# Patient Record
Sex: FEMALE | Race: WHITE | NOT HISPANIC OR LATINO | Employment: OTHER | URBAN - METROPOLITAN AREA
[De-identification: names, ages, dates, MRNs, and addresses within clinical notes are randomized per-mention and may not be internally consistent; named-entity substitution may affect disease eponyms.]

---

## 2023-03-03 LAB
ANION GAP IN SER/PLAS: 13 MMOL/L (ref 10–20)
CALCIDIOL (25 OH VITAMIN D3) (NG/ML) IN SER/PLAS: 57 NG/ML
CALCIUM (MG/DL) IN SER/PLAS: 9.3 MG/DL (ref 8.6–10.3)
CARBON DIOXIDE, TOTAL (MMOL/L) IN SER/PLAS: 27 MMOL/L (ref 21–32)
CHLORIDE (MMOL/L) IN SER/PLAS: 101 MMOL/L (ref 98–107)
CHOLESTEROL (MG/DL) IN SER/PLAS: 145 MG/DL (ref 0–199)
CHOLESTEROL IN HDL (MG/DL) IN SER/PLAS: 59.9 MG/DL
CHOLESTEROL/HDL RATIO: 2.4
CREATININE (MG/DL) IN SER/PLAS: 0.83 MG/DL (ref 0.5–1.05)
GFR FEMALE: 74 ML/MIN/1.73M2
GLUCOSE (MG/DL) IN SER/PLAS: 88 MG/DL (ref 74–99)
LDL: 68 MG/DL (ref 0–99)
POTASSIUM (MMOL/L) IN SER/PLAS: 4.2 MMOL/L (ref 3.5–5.3)
SODIUM (MMOL/L) IN SER/PLAS: 137 MMOL/L (ref 136–145)
THYROTROPIN (MIU/L) IN SER/PLAS BY DETECTION LIMIT <= 0.05 MIU/L: 1.28 MIU/L (ref 0.44–3.98)
TRIGLYCERIDE (MG/DL) IN SER/PLAS: 87 MG/DL (ref 0–149)
UREA NITROGEN (MG/DL) IN SER/PLAS: 13 MG/DL (ref 6–23)
VLDL: 17 MG/DL (ref 0–40)

## 2023-03-08 LAB — CANCER AG 19-9 (U/ML) IN SER/PLAS: 114.12 U/ML

## 2023-03-15 LAB — URINE CULTURE: NO GROWTH

## 2023-07-12 LAB
ALANINE AMINOTRANSFERASE (SGPT) (U/L) IN SER/PLAS: 15 U/L (ref 7–45)
ALBUMIN (G/DL) IN SER/PLAS: 4 G/DL (ref 3.4–5)
ALKALINE PHOSPHATASE (U/L) IN SER/PLAS: 54 U/L (ref 33–136)
ASPARTATE AMINOTRANSFERASE (SGOT) (U/L) IN SER/PLAS: 26 U/L (ref 9–39)
BILIRUBIN DIRECT (MG/DL) IN SER/PLAS: 0.1 MG/DL (ref 0–0.3)
BILIRUBIN TOTAL (MG/DL) IN SER/PLAS: 0.4 MG/DL (ref 0–1.2)
CALCIDIOL (25 OH VITAMIN D3) (NG/ML) IN SER/PLAS: 49 NG/ML
CHOLESTEROL (MG/DL) IN SER/PLAS: 152 MG/DL (ref 0–199)
CHOLESTEROL IN HDL (MG/DL) IN SER/PLAS: 54.9 MG/DL
CHOLESTEROL/HDL RATIO: 2.8
LDL: 67 MG/DL (ref 0–99)
PROTEIN TOTAL: 7.6 G/DL (ref 6.4–8.2)
THYROTROPIN (MIU/L) IN SER/PLAS BY DETECTION LIMIT <= 0.05 MIU/L: 2.81 MIU/L (ref 0.44–3.98)
TRIGLYCERIDE (MG/DL) IN SER/PLAS: 149 MG/DL (ref 0–149)
VLDL: 30 MG/DL (ref 0–40)

## 2023-07-21 LAB — URINE CULTURE: NORMAL

## 2023-09-13 PROBLEM — D22.9 SKIN MOLE: Status: ACTIVE | Noted: 2023-09-13

## 2023-09-13 PROBLEM — N95.2 POSTMENOPAUSAL ATROPHIC VAGINITIS: Status: ACTIVE | Noted: 2023-09-13

## 2023-09-13 PROBLEM — I83.93 VARICOSE VEINS OF BOTH LOWER EXTREMITIES: Status: ACTIVE | Noted: 2023-09-13

## 2023-09-13 PROBLEM — L81.4 OTHER MELANIN HYPERPIGMENTATION: Status: ACTIVE | Noted: 2023-05-11

## 2023-09-13 PROBLEM — D18.01 HEMANGIOMA OF SKIN AND SUBCUTANEOUS TISSUE: Status: ACTIVE | Noted: 2023-05-11

## 2023-09-13 PROBLEM — G43.909 MIGRAINE SYNDROME: Status: ACTIVE | Noted: 2023-09-13

## 2023-09-13 PROBLEM — D84.9 IMMUNODEFICIENCY (MULTI): Status: ACTIVE | Noted: 2023-09-13

## 2023-09-13 PROBLEM — D37.8 NEOPLASM OF UNCERTAIN BEHAVIOR OF PANCREAS: Status: ACTIVE | Noted: 2023-09-13

## 2023-09-13 PROBLEM — E04.2 MULTINODULAR GOITER: Status: ACTIVE | Noted: 2023-09-13

## 2023-09-13 PROBLEM — G47.00 INSOMNIA: Status: ACTIVE | Noted: 2023-09-13

## 2023-09-13 PROBLEM — Z22.39: Status: ACTIVE | Noted: 2023-09-13

## 2023-09-13 PROBLEM — G89.29 CHRONIC RIGHT SHOULDER PAIN: Status: ACTIVE | Noted: 2023-09-13

## 2023-09-13 PROBLEM — N81.4 CYSTOCELE WITH UTERINE PROLAPSE: Status: ACTIVE | Noted: 2023-09-13

## 2023-09-13 PROBLEM — D22.62 MELANOCYTIC NEVI OF LEFT UPPER LIMB, INCLUDING SHOULDER: Status: ACTIVE | Noted: 2023-05-11

## 2023-09-13 PROBLEM — F33.9 DEPRESSION, RECURRENT (CMS-HCC): Status: ACTIVE | Noted: 2023-09-13

## 2023-09-13 PROBLEM — M79.672 CHRONIC PAIN IN LEFT FOOT: Status: ACTIVE | Noted: 2023-09-13

## 2023-09-13 PROBLEM — D22.5 MELANOCYTIC NEVI OF TRUNK: Status: ACTIVE | Noted: 2023-05-11

## 2023-09-13 PROBLEM — R06.83 SNORES: Status: ACTIVE | Noted: 2023-09-13

## 2023-09-13 PROBLEM — R42 POSTURAL DIZZINESS: Status: ACTIVE | Noted: 2023-09-13

## 2023-09-13 PROBLEM — G89.29 CHRONIC HEADACHE: Status: ACTIVE | Noted: 2023-09-13

## 2023-09-13 PROBLEM — M25.532 CHRONIC PAIN OF LEFT WRIST: Status: ACTIVE | Noted: 2023-09-13

## 2023-09-13 PROBLEM — E55.9 VITAMIN D DEFICIENCY: Status: ACTIVE | Noted: 2023-09-13

## 2023-09-13 PROBLEM — G89.29 CHRONIC PAIN OF LEFT WRIST: Status: ACTIVE | Noted: 2023-09-13

## 2023-09-13 PROBLEM — H91.93 BILATERAL HEARING LOSS: Status: ACTIVE | Noted: 2023-09-13

## 2023-09-13 PROBLEM — K21.9 GERD (GASTROESOPHAGEAL REFLUX DISEASE): Status: ACTIVE | Noted: 2023-09-13

## 2023-09-13 PROBLEM — G96.191 TARLOV CYSTS: Status: ACTIVE | Noted: 2023-09-13

## 2023-09-13 PROBLEM — G25.81 RESTLESS LEGS SYNDROME: Status: ACTIVE | Noted: 2023-09-13

## 2023-09-13 PROBLEM — D22.70 MELANOCYTIC NEVI OF LOWER LIMB, INCLUDING HIP: Status: ACTIVE | Noted: 2023-05-11

## 2023-09-13 PROBLEM — J32.9 RECURRENT SINUSITIS: Status: ACTIVE | Noted: 2023-09-13

## 2023-09-13 PROBLEM — N12 PYELONEPHRITIS: Status: ACTIVE | Noted: 2023-09-13

## 2023-09-13 PROBLEM — G47.10 HYPERSOMNIA: Status: ACTIVE | Noted: 2023-09-13

## 2023-09-13 PROBLEM — E87.6 HYPOKALEMIA: Status: ACTIVE | Noted: 2023-09-13

## 2023-09-13 PROBLEM — H90.5 SNHL (SENSORINEURAL HEARING LOSS): Status: ACTIVE | Noted: 2023-09-13

## 2023-09-13 PROBLEM — M54.42 CHRONIC BILATERAL LOW BACK PAIN WITH LEFT-SIDED SCIATICA: Status: ACTIVE | Noted: 2023-09-13

## 2023-09-13 PROBLEM — J47.1 BRONCHIECTASIS WITH ACUTE EXACERBATION (MULTI): Status: ACTIVE | Noted: 2023-09-13

## 2023-09-13 PROBLEM — N95.0 POSTMENOPAUSAL BLEEDING: Status: ACTIVE | Noted: 2023-09-13

## 2023-09-13 PROBLEM — R53.81 MALAISE: Status: ACTIVE | Noted: 2023-09-13

## 2023-09-13 PROBLEM — D72.819 LEUKOPENIA: Status: ACTIVE | Noted: 2023-09-13

## 2023-09-13 PROBLEM — N39.0 RECURRENT UTI: Status: ACTIVE | Noted: 2023-09-13

## 2023-09-13 PROBLEM — G44.86 CERVICOGENIC HEADACHE: Status: ACTIVE | Noted: 2023-09-13

## 2023-09-13 PROBLEM — M81.0 AGE RELATED OSTEOPOROSIS: Status: ACTIVE | Noted: 2023-09-13

## 2023-09-13 PROBLEM — K86.2 PANCREATIC CYST (HHS-HCC): Status: ACTIVE | Noted: 2023-09-13

## 2023-09-13 PROBLEM — E78.5 DYSLIPIDEMIA: Status: ACTIVE | Noted: 2023-09-13

## 2023-09-13 PROBLEM — R53.81 PHYSICAL DECONDITIONING: Status: ACTIVE | Noted: 2023-09-13

## 2023-09-13 PROBLEM — L82.1 OTHER SEBORRHEIC KERATOSIS: Status: ACTIVE | Noted: 2023-05-11

## 2023-09-13 PROBLEM — R00.2 PALPITATIONS: Status: ACTIVE | Noted: 2023-09-13

## 2023-09-13 PROBLEM — R29.818 SUSPECTED SLEEP APNEA: Status: ACTIVE | Noted: 2023-09-13

## 2023-09-13 PROBLEM — D83.9 COMMON VARIABLE IMMUNODEFICIENCY (MULTI): Status: ACTIVE | Noted: 2023-09-13

## 2023-09-13 PROBLEM — R51.9 CHRONIC HEADACHE: Status: ACTIVE | Noted: 2023-09-13

## 2023-09-13 PROBLEM — M51.17 SCIATIC RADICULITIS: Status: ACTIVE | Noted: 2023-09-13

## 2023-09-13 PROBLEM — K52.9 CHRONIC DIARRHEA OF UNKNOWN ORIGIN: Status: ACTIVE | Noted: 2023-09-13

## 2023-09-13 PROBLEM — G89.29 CHRONIC PAIN IN LEFT FOOT: Status: ACTIVE | Noted: 2023-09-13

## 2023-09-13 PROBLEM — E03.9 HYPOTHYROIDISM: Status: ACTIVE | Noted: 2023-09-13

## 2023-09-13 PROBLEM — M25.511 CHRONIC RIGHT SHOULDER PAIN: Status: ACTIVE | Noted: 2023-09-13

## 2023-09-13 PROBLEM — M85.80 OSTEOPENIA: Status: ACTIVE | Noted: 2023-09-13

## 2023-09-13 PROBLEM — L98.8 RHYTIDOSIS FACIALIS: Status: ACTIVE | Noted: 2023-05-11

## 2023-09-13 PROBLEM — G89.29 CHRONIC BILATERAL LOW BACK PAIN WITH LEFT-SIDED SCIATICA: Status: ACTIVE | Noted: 2023-09-13

## 2023-09-13 RX ORDER — SODIUM FLUORIDE 5 MG/G
GEL, DENTIFRICE DENTAL
COMMUNITY
Start: 2021-03-15 | End: 2024-02-12 | Stop reason: WASHOUT

## 2023-09-13 RX ORDER — ALBUTEROL SULFATE 0.83 MG/ML
1 SOLUTION RESPIRATORY (INHALATION) 2 TIMES DAILY
COMMUNITY
Start: 2023-03-30 | End: 2024-02-13 | Stop reason: SDUPTHER

## 2023-09-13 RX ORDER — AZELASTINE HCL 205.5 UG/1
SPRAY NASAL 2 TIMES DAILY
COMMUNITY

## 2023-09-13 RX ORDER — ESCITALOPRAM OXALATE 5 MG/1
5 TABLET ORAL NIGHTLY
COMMUNITY
Start: 2022-02-17 | End: 2023-10-16

## 2023-09-13 RX ORDER — ESCITALOPRAM OXALATE 10 MG/1
10 TABLET ORAL
COMMUNITY
Start: 2014-12-08 | End: 2023-10-16 | Stop reason: SDUPTHER

## 2023-09-13 RX ORDER — GINSENG 100 MG
CAPSULE ORAL
COMMUNITY
End: 2023-10-16

## 2023-09-13 RX ORDER — AFLIBERCEPT 40 MG/ML
2 INJECTION, SOLUTION INTRAVITREAL ONCE
COMMUNITY

## 2023-09-13 RX ORDER — PSYLLIUM HUSK 0.4 G
CAPSULE ORAL
COMMUNITY
Start: 2023-03-17

## 2023-09-13 RX ORDER — AZELASTINE 1 MG/ML
SPRAY, METERED NASAL
COMMUNITY
Start: 2023-04-13

## 2023-09-13 RX ORDER — PHENYLPROPANOLAMINE/CLEMASTINE 75-1.34MG
600 TABLET, EXTENDED RELEASE ORAL DAILY PRN
COMMUNITY

## 2023-09-13 RX ORDER — ALBUTEROL SULFATE 90 UG/1
2 AEROSOL, METERED RESPIRATORY (INHALATION) 2 TIMES DAILY
COMMUNITY

## 2023-09-13 RX ORDER — FAMOTIDINE 20 MG/1
20 TABLET, FILM COATED ORAL
COMMUNITY
Start: 2019-10-18 | End: 2023-10-16 | Stop reason: SDUPTHER

## 2023-09-13 RX ORDER — MULTIVITAMIN WITH IRON
1 TABLET ORAL DAILY
COMMUNITY
Start: 2020-04-08

## 2023-09-13 RX ORDER — ROSUVASTATIN CALCIUM 20 MG/1
1 TABLET, COATED ORAL DAILY
COMMUNITY
Start: 2019-07-19 | End: 2023-10-16

## 2023-09-13 RX ORDER — BUDESONIDE 0.5 MG/2ML
0.5 INHALANT ORAL
COMMUNITY
Start: 2015-08-25

## 2023-09-13 RX ORDER — EPINEPHRINE 0.22MG
100 AEROSOL WITH ADAPTER (ML) INHALATION
COMMUNITY

## 2023-09-13 RX ORDER — TALC
POWDER (GRAM) TOPICAL
COMMUNITY

## 2023-09-13 RX ORDER — ESTRADIOL 0.1 MG/G
CREAM VAGINAL
COMMUNITY
Start: 2022-03-16 | End: 2024-03-11 | Stop reason: SDUPTHER

## 2023-09-13 RX ORDER — FOLIC ACID 20 MG
CAPSULE ORAL
COMMUNITY
End: 2023-10-16

## 2023-09-13 RX ORDER — LEVOTHYROXINE SODIUM 75 UG/1
1 CAPSULE ORAL
COMMUNITY
End: 2024-03-14 | Stop reason: SDUPTHER

## 2023-09-13 RX ORDER — TRETINOIN 0.25 MG/G
CREAM TOPICAL
COMMUNITY
Start: 2023-05-11 | End: 2024-04-19 | Stop reason: ALTCHOICE

## 2023-09-13 RX ORDER — FOLIC ACID 1 MG/1
1 TABLET ORAL DAILY
COMMUNITY

## 2023-09-13 RX ORDER — DOCUSATE SODIUM 100 MG/1
100 CAPSULE, LIQUID FILLED ORAL
COMMUNITY
End: 2023-10-16

## 2023-09-13 RX ORDER — CEFADROXIL 500 MG/1
500 CAPSULE ORAL DAILY
COMMUNITY
Start: 2022-03-07 | End: 2023-10-16

## 2023-09-13 RX ORDER — POTASSIUM CHLORIDE 750 MG/1
10 CAPSULE, EXTENDED RELEASE ORAL DAILY
COMMUNITY
End: 2023-10-16 | Stop reason: SDUPTHER

## 2023-09-13 RX ORDER — PSYLLIUM HUSK/CALCIUM CARB 1 G-60 MG
CAPSULE ORAL
COMMUNITY
End: 2023-10-16

## 2023-09-13 RX ORDER — ROPINIROLE 4 MG/1
4 TABLET, FILM COATED ORAL
COMMUNITY
Start: 2014-04-18 | End: 2024-03-14 | Stop reason: SDUPTHER

## 2023-09-13 RX ORDER — POTASSIUM &MAGNESIUM ASPARTATE 250-250 MG
CAPSULE ORAL
COMMUNITY

## 2023-09-13 RX ORDER — IPRATROPIUM BROMIDE 42 UG/1
SPRAY, METERED NASAL
COMMUNITY
Start: 2015-09-30

## 2023-10-02 DIAGNOSIS — Z82.0: Primary | ICD-10-CM

## 2023-10-04 ENCOUNTER — ANCILLARY PROCEDURE (OUTPATIENT)
Dept: RADIOLOGY | Facility: CLINIC | Age: 73
End: 2023-10-04
Payer: MEDICARE

## 2023-10-04 DIAGNOSIS — Z12.31 ENCOUNTER FOR SCREENING MAMMOGRAM FOR MALIGNANT NEOPLASM OF BREAST: ICD-10-CM

## 2023-10-04 PROCEDURE — 77063 BREAST TOMOSYNTHESIS BI: CPT | Mod: BILATERAL PROCEDURE | Performed by: RADIOLOGY

## 2023-10-04 PROCEDURE — 77067 SCR MAMMO BI INCL CAD: CPT | Mod: BILATERAL PROCEDURE | Performed by: RADIOLOGY

## 2023-10-04 PROCEDURE — 77067 SCR MAMMO BI INCL CAD: CPT

## 2023-10-05 ENCOUNTER — HOSPITAL ENCOUNTER (OUTPATIENT)
Dept: RADIOLOGY | Facility: HOSPITAL | Age: 73
Discharge: HOME | End: 2023-10-05
Payer: MEDICARE

## 2023-10-05 ENCOUNTER — TELEPHONE (OUTPATIENT)
Dept: PRIMARY CARE | Facility: CLINIC | Age: 73
End: 2023-10-05
Payer: MEDICARE

## 2023-10-05 DIAGNOSIS — M81.0 AGE-RELATED OSTEOPOROSIS WITHOUT CURRENT PATHOLOGICAL FRACTURE: ICD-10-CM

## 2023-10-05 PROCEDURE — 77085 DXA BONE DENSITY AXL VRT FX: CPT

## 2023-10-05 PROCEDURE — 77085 DXA BONE DENSITY AXL VRT FX: CPT | Performed by: STUDENT IN AN ORGANIZED HEALTH CARE EDUCATION/TRAINING PROGRAM

## 2023-10-09 ENCOUNTER — TELEPHONE (OUTPATIENT)
Dept: PRIMARY CARE | Facility: CLINIC | Age: 73
End: 2023-10-09
Payer: MEDICARE

## 2023-10-16 ENCOUNTER — OFFICE VISIT (OUTPATIENT)
Dept: PRIMARY CARE | Facility: CLINIC | Age: 73
End: 2023-10-16
Payer: MEDICARE

## 2023-10-16 VITALS
RESPIRATION RATE: 16 BRPM | WEIGHT: 124.34 LBS | SYSTOLIC BLOOD PRESSURE: 90 MMHG | OXYGEN SATURATION: 97 % | HEIGHT: 67 IN | HEART RATE: 56 BPM | DIASTOLIC BLOOD PRESSURE: 60 MMHG | BODY MASS INDEX: 19.52 KG/M2

## 2023-10-16 DIAGNOSIS — K21.00 GASTROESOPHAGEAL REFLUX DISEASE WITH ESOPHAGITIS WITHOUT HEMORRHAGE: ICD-10-CM

## 2023-10-16 DIAGNOSIS — D84.9 IMMUNODEFICIENCY (MULTI): ICD-10-CM

## 2023-10-16 DIAGNOSIS — E87.6 HYPOKALEMIA: ICD-10-CM

## 2023-10-16 DIAGNOSIS — E03.0 CONGENITAL HYPOTHYROIDISM WITH DIFFUSE GOITER: Primary | ICD-10-CM

## 2023-10-16 DIAGNOSIS — F33.9 DEPRESSION, RECURRENT (CMS-HCC): ICD-10-CM

## 2023-10-16 DIAGNOSIS — E78.5 DYSLIPIDEMIA: ICD-10-CM

## 2023-10-16 PROCEDURE — 1036F TOBACCO NON-USER: CPT | Performed by: INTERNAL MEDICINE

## 2023-10-16 PROCEDURE — 1159F MED LIST DOCD IN RCRD: CPT | Performed by: INTERNAL MEDICINE

## 2023-10-16 PROCEDURE — 1126F AMNT PAIN NOTED NONE PRSNT: CPT | Performed by: INTERNAL MEDICINE

## 2023-10-16 PROCEDURE — 99214 OFFICE O/P EST MOD 30 MIN: CPT | Performed by: INTERNAL MEDICINE

## 2023-10-16 RX ORDER — POTASSIUM CHLORIDE 750 MG/1
CAPSULE, EXTENDED RELEASE ORAL
Qty: 90 CAPSULE | Refills: 0 | Status: SHIPPED | OUTPATIENT
Start: 2023-10-16 | End: 2024-03-12

## 2023-10-16 RX ORDER — VITAMIN E MIXED 400 UNIT
CAPSULE ORAL DAILY
COMMUNITY
End: 2024-01-22 | Stop reason: ENTERED-IN-ERROR

## 2023-10-16 RX ORDER — ESCITALOPRAM OXALATE 5 MG/1
TABLET ORAL
Qty: 90 TABLET | Refills: 1 | Status: SHIPPED | OUTPATIENT
Start: 2023-10-16 | End: 2024-03-14 | Stop reason: SDUPTHER

## 2023-10-16 RX ORDER — FAMOTIDINE 20 MG/1
TABLET, FILM COATED ORAL
Qty: 180 TABLET | Refills: 1 | Status: SHIPPED | OUTPATIENT
Start: 2023-10-16 | End: 2024-03-14 | Stop reason: SDUPTHER

## 2023-10-16 ASSESSMENT — COLUMBIA-SUICIDE SEVERITY RATING SCALE - C-SSRS
1. IN THE PAST MONTH, HAVE YOU WISHED YOU WERE DEAD OR WISHED YOU COULD GO TO SLEEP AND NOT WAKE UP?: NO
2. HAVE YOU ACTUALLY HAD ANY THOUGHTS OF KILLING YOURSELF?: NO
6. HAVE YOU EVER DONE ANYTHING, STARTED TO DO ANYTHING, OR PREPARED TO DO ANYTHING TO END YOUR LIFE?: NO

## 2023-10-16 ASSESSMENT — ENCOUNTER SYMPTOMS
APPETITE CHANGE: 0
DEPRESSION: 0
OCCASIONAL FEELINGS OF UNSTEADINESS: 0
FATIGUE: 0
FEVER: 0
DIAPHORESIS: 0
LOSS OF SENSATION IN FEET: 0

## 2023-10-16 ASSESSMENT — PATIENT HEALTH QUESTIONNAIRE - PHQ9
2. FEELING DOWN, DEPRESSED OR HOPELESS: NOT AT ALL
1. LITTLE INTEREST OR PLEASURE IN DOING THINGS: NOT AT ALL
SUM OF ALL RESPONSES TO PHQ9 QUESTIONS 1 AND 2: 0

## 2023-10-16 NOTE — PATIENT INSTRUCTIONS
Please decreased escitalopram to  5 mg,continue other meds the same, discuss with ID recent visit to ER. Return here before March , as needed.

## 2023-10-16 NOTE — PROGRESS NOTES
"Subjective   Patient ID: ELIANE Yan is a 73 y.o. female who presents for chest tighness after  ER visit.     HPI   She went to [facility] ER for chest /breast soreness  worse when took deep breaths and right scapular pain.   Her d dimer was low probability, normal troponin, unchanged chest xray,. She recalls short lasting pleuritc pain 1 m prior and more sputum after she uses vest on chest.  It was intrepreted as exacerbation of bronchitis and treated with doxycicline 100 mg bid for 5 days, that finished 8 days ago. She feels back in baseline. Able  to continue walking in part up the [place]  Pt is receiving for 5 month in [facility] for Macular Degeneration with great improvement  Saw genetics pending labs.  Mood very good ,  family situation is greatly improved, wants to decreease escitalopram  Heartburn stable in PPI  Review of Systems   Constitutional:  Negative for appetite change, diaphoresis, fatigue and fever.   All other systems reviewed and are negative.      Objective   BP 90/60 (BP Location: Right arm, Patient Position: Sitting, BP Cuff Size: Adult)   Pulse 56   Resp 16   Ht 1.702 m (5' 7\")   Wt 56.4 kg (124 lb 5.4 oz)   LMP  (LMP Unknown)   SpO2 97%   BMI 19.47 kg/m²   General- non toxic, no respiratory distress, ocasssional wet cough  Eyes- Conjunctiva clear, PERRLA  Ears- TMs clear, no erythema, no fluid, TMs not retracted or bulging  Nose- external normal, turbinates free of swelling, erythema, and drainage, mild frontal  sinus tenderness  Throat- mucus membranes moist, no erythema, no sinus tract drainage, no exudates or lesions  Neck- nontender, no enlarged cervical lymphadenopathy  Cardiac- regular rate and rhythm, no murmurs, no gallop or rubs  Lung- clear to auscultation, no rales, isolated  rhonchi, no wheezing  GI- normally active bowel sounds, nontender, nondistended, no organomegaly, no guarding  skin no new lesions  Extremities- no edema  Neuro- non focal, oriented x " 3  Psychiatric- no hallucinations    Physical Exam  above  Assessment/Plan   Problem List Items Addressed This Visit             ICD-10-CM       Cardiac and Vasculature    Dyslipidemia E78.5       Endocrine/Metabolic    Hypothyroidism - Primary E03.9       Gastrointestinal and Abdominal    GERD (gastroesophageal reflux disease) K21.9     Continue famotidine         Relevant Medications    famotidine (Pepcid) 20 mg tablet       Genitourinary and Reproductive    Hypokalemia E87.6     Refill potasim         Relevant Medications    potassium chloride ER (Micro-K) 10 mEq ER capsule       Mental Health    Depression, recurrent (CMS/HCC) F33.9     Decreased escitalopram         Relevant Medications    escitalopram (Lexapro) 5 mg tablet       Multi-system (Lupus, Sarcoid)    Immunodeficiency (CMS/HCC) D84.9     Vinnie discuss with ID recentsamantha exacerbation of production and bronchitis.

## 2023-11-09 DIAGNOSIS — J47.0 BRONCHIECTASIS WITH ACUTE LOWER RESPIRATORY INFECTION (MULTI): Primary | ICD-10-CM

## 2023-11-10 PROCEDURE — 87116 MYCOBACTERIA CULTURE: CPT

## 2023-11-10 PROCEDURE — 87015 SPECIMEN INFECT AGNT CONCNTJ: CPT

## 2023-11-10 PROCEDURE — 87116 MYCOBACTERIA CULTURE: CPT | Mod: OUT | Performed by: INTERNAL MEDICINE

## 2023-11-10 PROCEDURE — 87015 SPECIMEN INFECT AGNT CONCNTJ: CPT | Mod: OUT | Performed by: INTERNAL MEDICINE

## 2023-11-10 PROCEDURE — 87206 SMEAR FLUORESCENT/ACID STAI: CPT

## 2023-11-11 ENCOUNTER — LAB REQUISITION (OUTPATIENT)
Dept: LAB | Facility: HOSPITAL | Age: 73
End: 2023-11-11
Payer: MEDICARE

## 2023-11-11 DIAGNOSIS — J47.0 BRONCHIECTASIS WITH ACUTE LOWER RESPIRATORY INFECTION (MULTI): ICD-10-CM

## 2023-11-22 ENCOUNTER — APPOINTMENT (OUTPATIENT)
Dept: LAB | Facility: LAB | Age: 73
End: 2023-11-22
Payer: MEDICARE

## 2024-01-11 LAB
ACID FAST STN SPEC: NORMAL
MYCOBACTERIUM SPEC CULT: NORMAL

## 2024-01-22 ENCOUNTER — TELEMEDICINE (OUTPATIENT)
Dept: BEHAVIORAL HEALTH | Facility: CLINIC | Age: 74
End: 2024-01-22
Payer: MEDICARE

## 2024-01-22 DIAGNOSIS — Z82.0: ICD-10-CM

## 2024-01-22 DIAGNOSIS — F33.9 DEPRESSION, RECURRENT (CMS-HCC): Primary | ICD-10-CM

## 2024-01-22 PROCEDURE — 99205 OFFICE O/P NEW HI 60 MIN: CPT | Performed by: PSYCHIATRY & NEUROLOGY

## 2024-01-22 NOTE — PATIENT INSTRUCTIONS
- Ok to proceed with genetic testing from psychiatric standpoint.   - Continue escitalopram 5mg daily   - Discuss your questions regarding implications of test results and how best to communicate (or not) with your family members with Dr. Calix.   - Follow up with me as needed - you can let Dr. Calix know after you receive your results or contact me.   - Contact via [employer] or call ([phone]) in case of any questions or concerns.  - Call [phone] for mental health crisis or suicidal thoughts, or call 911 or go to the nearest emergency room for emergencies.

## 2024-01-22 NOTE — PROGRESS NOTES
"Outpatient Psychiatry Initial Evaluation    Ms. Abad Yan is a 73 y.o. female with a history of depression, primary immune deficiency, hypothyroidism, restless leg syndrome, hyperlipidemia.      Seen with  Danilo (with patient's permission)    Chief complaint:  Patient is referred by Dr. Chen Calix (genetics) for psychiatric clearance for genetic testing.     History of Presenting Illness:     She says that her brother has been diagnosed with frontotemporal dementia - he had noticeable symptoms in his late 70s, and was diagnosed with frontotemporal dementia around age 80. His symptoms progressed quickly and is now in a facility at age 81. His wife is his legal guardian and they are resistant to getting genetic testing for her brother.     She says she is sad about her brother's diagnosis. However, she says that she is not overly concerned about herself and says she will deal with it if she tests positive for genetic mutations. She does not anticipate any major changes even if she tests positive but would work on putting some \"safeguards\" in place. She says that she is \"older\" and not too concerned about her genetic status but is concerned about two children, and would like to know for their sake, as they may want to start a family.     She says she has dealt with depression intermittently. She says her parents went through a divorce when she was 17; her father left them when she was 14. She lived with her mother, who was also very depressed. She says she saw a psychiatrist and a counselor around this time and when she went to college. She says she had a recurrence of her depression due to work stress around 2000; she was very anxious about the situation, and started taking a \"benzodiazepine.\" She got off of it soon afterwards. She says she was let go from her job in 2005. She says she has an \"allergic reaction\" to anti-depressants and usually gets low doses prescribed. She went through a " "number of anti-depressants and is now on escitalopram 5mg, which she feels is helping.    She says her mood is \"great.\"  Appetite - says she eats but does not have \"sense of hunger\" for years; no significant weight change.   Sleep - \"fine.\" Usually does not go to bed early but sleeps about 9 hours.   Energy - has chronic fatigue due to primary immune deficiency  Motivation - fine  Concentration - gets distracted easily  Denies any death wishes or suicidal thoughts, intent or plans.     She denies feeling anxiety or excessive worry. She denies any panic attacks.     Denies any manic or hypomanic symptoms.     Denies AVH, paranoia or delusions.     Denies OCD symptoms.     Denies eating disorder history.     Denies impulsivity or aggressive behaviors.      Denies any problems with memory. Independent with IADLs. No problems driving, finances, cooking etc.       Current Medications:  Reviewed, includes escitalopram 5mg once daily  Also on ropinirole for RLS.     Medical Review Of Systems:    Pertinent items are noted in HPI.    Past Psychiatric History:   Previous therapy: yes. Was seeing someone but decided she did not need to see them just a few months ago.   Previous psychiatric treatment and medication trials: yes. Multiple medications.   Previous psychiatric hospitalizations: no  Previous diagnoses: depression, anxiety.   Previous suicide attempts: no  History of violence: no  Currently in treatment with primary care provider.    Substance Abuse History:  Recreational drugs:  smoked marijuana for a few years in college and a few years afterwards. Denies any other illicit drug use.   Use of alcohol:  drinks about 5 drinks a week. She says she realized she was drinking more than appropriate when she was about 38.   Use of caffeine:  drinks about 3 cups a day.    Tobacco use:  smoked for 10 years from age 16 to 26.   Legal consequences of chemical use: no      Personal/Social history:   Raised by: biological parents " until age 14, then with mother.   Has one brother.   Childhood trauma: parents  when she was 14, and  at age 17.     Education: has a Master's degree in social work.   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 Retired in .     Relationship/marital status:  for 43 years.   Children: Two children   Living situation: Lives at home with .   Safety at home: Yes.     Legal history: None.    history: None.     Family history:   Mother - depression   Paternal Aunt:  at 91 yr, Lewy body dementia   Paternal grandmother: Dementia and stroke,  at 84  Paternal great aunt (paternal grandfather's sister): Dementia of unknown type,  at 81  Brother (81yr): Frontotemporal dementia, began showing symptoms in his late 70's  Maternal first cousin (81yr): Dementia, stroke, still living.   No family history of suicide in family.     PMH/PSH:  Past Medical History:   Diagnosis Date    Chronic rhinitis     Chronic rhinitis    Contact with and (suspected) exposure to pediculosis, acariasis and other infestations 2014    Scabies exposure    Disorder of kidney and ureter, unspecified 2016    Mild renal insufficiency    Encounter for screening mammogram for malignant neoplasm of breast 2015    Other screening mammogram    Headache, unspecified 2017    Worsening headaches    Hemoptysis 10/25/2019    Cough with hemoptysis    Menopausal and female climacteric states 2015    Menopausal symptoms    Other conditions influencing health status     Acute Sphenoidal Sinusitis    Other conditions influencing health status     Pulmonary Disease    Personal history of diseases of the blood and blood-forming organs and certain disorders involving the immune mechanism 2022    History of immunodeficiency    Personal history of other diseases of the circulatory system 2018    History of diastolic dysfunction    Personal history of other  diseases of the circulatory system 11/05/2018    History of abnormal electrocardiography    Personal history of other diseases of the circulatory system 12/07/2018    History of cardiomyopathy    Personal history of other diseases of the circulatory system     History of peripheral vascular disease    Personal history of other diseases of the female genital tract 07/21/2015    History of postmenopausal bleeding    Personal history of other diseases of the female genital tract     History of pelvic inflammatory disease    Personal history of other diseases of the respiratory system 08/05/2013    Personal history of asthma    Personal history of other diseases of the respiratory system 01/27/2016    History of upper respiratory infection    Personal history of other diseases of the respiratory system 09/12/2013    History of upper respiratory infection    Personal history of other diseases of the respiratory system 12/16/2013    History of upper respiratory infection    Personal history of other diseases of the respiratory system 08/05/2013    History of allergic rhinitis    Personal history of other diseases of urinary system 03/14/2022    History of pyelonephritis    Personal history of other specified conditions 03/10/2016    History of abnormal mammogram    Personal history of other specified conditions 01/06/2020    History of chest pain    Personal history of other specified conditions 11/21/2018    History of hemoptysis    Pneumonia, unspecified organism 11/06/2015    CAP (community acquired pneumonia)    Rash and other nonspecific skin eruption 12/08/2014    Rash of neck    Trochanteric bursitis, unspecified hip 04/18/2014    Trochanteric bursitis    Urinary tract infection, site not specified 02/10/2022    Acute UTI        Meds  Current Outpatient Medications on File Prior to Visit   Medication Sig Dispense Refill    aflibercept (Eylea) 2 mg/0.05 mL intra-ocular injection 0.05 mL (2 mg) by intravitreal route  1 time.      albuterol 2.5 mg /3 mL (0.083 %) nebulizer solution Take 1 vial by nebulization 2 times a day.      albuterol 90 mcg/actuation inhaler Inhale 2 puffs 2 times a day.      azelastine (Astelin) 137 mcg (0.1 %) nasal spray       azelastine 205.5 mcg (0.15 %) spray,non-aerosol Administer into affected nostril(s) twice a day.      budesonide (Pulmicort) 0.5 mg/2 mL nebulizer solution Inhale 2 mL (0.5 mg) 2 times a day.      CALCIUM CARBONATE-VITAMIN D3 ORAL Take 1 tablet by mouth once daily.      coenzyme Q-10 100 mg capsule Take 1 capsule (100 mg) by mouth.      cranberry 500 mg capsule       escitalopram (Lexapro) 5 mg tablet Take one tablet qpm 90 tablet 1    estradiol (Estrace) 0.01 % (0.1 mg/gram) vaginal cream       famotidine (Pepcid) 20 mg tablet Take one tablet in empty stomach in amand one table in empty stomach in the afternoon 180 tablet 1    ferrous sulfate  mg ER tablet Take 1 tablet by mouth once daily.      fluoride, sodium, (PreviDent) 1.1 % gel Apply to teeth.      folic acid (Folvite) 1 mg tablet Take 1 tablet (1 mg) by mouth once daily.      ibuprofen (Motrin) capsule Take 3 capsules (600 mg) by mouth once daily as needed.      immune globulin, human, (Privigen) 10 % solution infusion orally every 3 weeks      ipratropium (Atrovent) 42 mcg (0.06 %) nasal spray Administer into affected nostril(s).      levothyroxine (Tirosint) 75 mcg capsule Take by mouth.      magnesium oxide (Mag-Ox) 250 mg magnesium tablet       multivitamin (Multiple Vitamins) tablet Take 1 tablet by mouth once daily.      potassium chloride ER (Micro-K) 10 mEq ER capsule Take one capsule daily with food 90 capsule 0    psyllium (Metamucil) 0.4 gram capsule Take by mouth.      rOPINIRole (Requip) 4 mg tablet Take 1 tablet (4 mg) by mouth.      rosuvastatin 10 mg capsule, sprinkle Take 10 mg by mouth.      tretinoin (Retin-A) 0.025 % cream Apply topically.      vitamin E 450 mg (1000 unit) capsule Take by mouth  "once daily.       No current facility-administered medications on file prior to visit.        Allergies:   Allergies   Allergen Reactions    Citalopram Unknown    Doxepin Hives and Unknown    Fluoxetine Hives and Unknown    Nortriptyline Hives and Unknown    Sertraline Unknown       Record Review: brief     Mental Status Evaluation:  Appearance: Appears to be stated age. Well groomed. Good hygiene.   Behavior/Attitude: Cooperative. Pleasant.    Motor: Psychomotor activity in average range. No abnormal involuntary movements.    Speech: Regular in rate, tone and volume. No pressure.   Mood: \"great\"  Affect: Congruent to stated mood. Mobilized appropriately. Normal range.    Thought process: Goal-directed. Linear. Organized.   Thought content: No paranoia, delusion or ideas of reference elicited. No hallucinations in auditory, visual or other sensory modalities.    Suicidal ideation: denied.   Homicidal ideation: denied.   Insight: Fair  Judgment: Fair  Orientation: oriented correctly to time, place and person.   Recent and remote memory: intact based on recall of recent and remote autobiographical memories.   Attention/concentration: intact during visit   Language: No aphasia or paraphasic errors during conversation    Fund of knowledge: Average       Assessment/Plan   Assessment:   Ms. Abad Yan is a 73 y.o. female with a history of depression, primary immune deficiency, hypothyroidism, restless leg syndrome, hyperlipidemia. Patient is referred by Dr. Chen Calix (genetics) for psychiatric clearance for genetic testing.     She has a history of depression and anxiety with a couple of episodes but fairly stable recently on low dose of escitalopram. No history of quita, psychosis, impulsivity or substance use disorder. She does not have a history of suicide attempts.     Diagnosis:   Other depressive disorder, in remission    Treatment Plan/Recommendations:   - She is cleared for genetic testing from " psychiatric standpoint.   - She and her  had questions about if they should communicate results with their family (their children as well as her brother's children), and if so, what is the best way to do so. Provided some general information but encouraged to discuss this further with Dr. Claix.   - Advised to let Dr. Calix know or contact me if she would like to schedule a follow up after she gets her test results.   - Contact me sooner if needed.     Review with patient: Treatment plan reviewed with the patient.    Alfreda Bacon MD

## 2024-01-22 NOTE — LETTER
"January 22, 2024     Chen Calix MD  [address]   [facility]  [facility] [address]    Patient: ABAD Yan   YOB: 1950   Date of Visit: 1/22/2024       Dear Dr. Chen Calix MD:    Thank you for referring ABAD Yan to me for evaluation. Below are my notes for this consultation.  If you have questions, please do not hesitate to call me. I look forward to following your patient along with you.       Sincerely,     Alfreda Bacon MD      CC: No Recipients  ______________________________________________________________________________________    Outpatient Psychiatry Initial Evaluation    Ms. Abad Yan is a 73 y.o. female with a history of depression, primary immune deficiency, hypothyroidism, restless leg syndrome, hyperlipidemia.      Seen with  Danilo (with patient's permission)    Chief complaint:  Patient is referred by Dr. Chen Calix (genetics) for psychiatric clearance for genetic testing.     History of Presenting Illness:     She says that her brother has been diagnosed with frontotemporal dementia - he had noticeable symptoms in his late 70s, and was diagnosed with frontotemporal dementia around age 80. His symptoms progressed quickly and is now in a facility at age 81. His wife is his legal guardian and they are resistant to getting genetic testing for her brother.     She says she is sad about her brother's diagnosis. However, she says that she is not overly concerned about herself and says she will deal with it if she tests positive for genetic mutations. She does not anticipate any major changes even if she tests positive but would work on putting some \"safeguards\" in place. She says that she is \"older\" and not too concerned about her genetic status but is concerned about two children, and would like to know for their sake, as they may want to start a family.     She says she has dealt with depression " "intermittently. She says her parents went through a divorce when she was 17; her father left them when she was 14. She lived with her mother, who was also very depressed. She says she saw a psychiatrist and a counselor around this time and when she went to college. She says she had a recurrence of her depression due to work stress around 2000; she was very anxious about the situation, and started taking a \"benzodiazepine.\" She got off of it soon afterwards. She says she was let go from her job in 2005. She says she has an \"allergic reaction\" to anti-depressants and usually gets low doses prescribed. She went through a number of anti-depressants and is now on escitalopram 5mg, which she feels is helping.    She says her mood is \"great.\"  Appetite - says she eats but does not have \"sense of hunger\" for years; no significant weight change.   Sleep - \"fine.\" Usually does not go to bed early but sleeps about 9 hours.   Energy - has chronic fatigue due to primary immune deficiency  Motivation - fine  Concentration - gets distracted easily  Denies any death wishes or suicidal thoughts, intent or plans.     She denies feeling anxiety or excessive worry. She denies any panic attacks.     Denies any manic or hypomanic symptoms.     Denies AVH, paranoia or delusions.     Denies OCD symptoms.     Denies eating disorder history.     Denies impulsivity or aggressive behaviors.      Denies any problems with memory. Independent with IADLs. No problems driving, finances, cooking etc.       Current Medications:  Reviewed, includes escitalopram 5mg once daily  Also on ropinirole for RLS.     Medical Review Of Systems:    Pertinent items are noted in HPI.    Past Psychiatric History:   Previous therapy: yes. Was seeing someone but decided she did not need to see them just a few months ago.   Previous psychiatric treatment and medication trials: yes. Multiple medications.   Previous psychiatric hospitalizations: no  Previous diagnoses: " depression, anxiety.   Previous suicide attempts: no  History of violence: no  Currently in treatment with primary care provider.    Substance Abuse History:  Recreational drugs:  smoked marijuana for a few years in college and a few years afterwards. Denies any other illicit drug use.   Use of alcohol:  drinks about 5 drinks a week. She says she realized she was drinking more than appropriate when she was about 38.   Use of caffeine:  drinks about 3 cups a day.    Tobacco use:  smoked for 10 years from age 16 to 26.   Legal consequences of chemical use: no      Personal/Social history:   Raised by: biological parents until age 14, then with mother.   Has one brother.   Childhood trauma: parents  when she was 14, and  at age 17.     Education: has a Master's degree in social work.   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 Retired in .     Relationship/marital status:  for 43 years.   Children: Two children   Living situation: Lives at home with .   Safety at home: Yes.     Legal history: None.    history: None.     Family history:   Mother - depression   Paternal Aunt:  at 91 yr, Lewy body dementia   Paternal grandmother: Dementia and stroke,  at 84  Paternal great aunt (paternal grandfather's sister): Dementia of unknown type,  at 81  Brother (81yr): Frontotemporal dementia, began showing symptoms in his late 70's  Maternal first cousin (81yr): Dementia, stroke, still living.   No family history of suicide in family.     PMH/PSH:  Past Medical History:   Diagnosis Date   • Chronic rhinitis     Chronic rhinitis   • Contact with and (suspected) exposure to pediculosis, acariasis and other infestations 2014    Scabies exposure   • Disorder of kidney and ureter, unspecified 2016    Mild renal insufficiency   • Encounter for screening mammogram for malignant neoplasm of breast 2015    Other screening mammogram   • Headache,  unspecified 06/28/2017    Worsening headaches   • Hemoptysis 10/25/2019    Cough with hemoptysis   • Menopausal and female climacteric states 07/21/2015    Menopausal symptoms   • Other conditions influencing health status     Acute Sphenoidal Sinusitis   • Other conditions influencing health status     Pulmonary Disease   • Personal history of diseases of the blood and blood-forming organs and certain disorders involving the immune mechanism 05/27/2022    History of immunodeficiency   • Personal history of other diseases of the circulatory system 12/07/2018    History of diastolic dysfunction   • Personal history of other diseases of the circulatory system 11/05/2018    History of abnormal electrocardiography   • Personal history of other diseases of the circulatory system 12/07/2018    History of cardiomyopathy   • Personal history of other diseases of the circulatory system     History of peripheral vascular disease   • Personal history of other diseases of the female genital tract 07/21/2015    History of postmenopausal bleeding   • Personal history of other diseases of the female genital tract     History of pelvic inflammatory disease   • Personal history of other diseases of the respiratory system 08/05/2013    Personal history of asthma   • Personal history of other diseases of the respiratory system 01/27/2016    History of upper respiratory infection   • Personal history of other diseases of the respiratory system 09/12/2013    History of upper respiratory infection   • Personal history of other diseases of the respiratory system 12/16/2013    History of upper respiratory infection   • Personal history of other diseases of the respiratory system 08/05/2013    History of allergic rhinitis   • Personal history of other diseases of urinary system 03/14/2022    History of pyelonephritis   • Personal history of other specified conditions 03/10/2016    History of abnormal mammogram   • Personal history of other  specified conditions 01/06/2020    History of chest pain   • Personal history of other specified conditions 11/21/2018    History of hemoptysis   • Pneumonia, unspecified organism 11/06/2015    CAP (community acquired pneumonia)   • Rash and other nonspecific skin eruption 12/08/2014    Rash of neck   • Trochanteric bursitis, unspecified hip 04/18/2014    Trochanteric bursitis   • Urinary tract infection, site not specified 02/10/2022    Acute UTI        Meds  Current Outpatient Medications on File Prior to Visit   Medication Sig Dispense Refill   • aflibercept (Eylea) 2 mg/0.05 mL intra-ocular injection 0.05 mL (2 mg) by intravitreal route 1 time.     • albuterol 2.5 mg /3 mL (0.083 %) nebulizer solution Take 1 vial by nebulization 2 times a day.     • albuterol 90 mcg/actuation inhaler Inhale 2 puffs 2 times a day.     • azelastine (Astelin) 137 mcg (0.1 %) nasal spray      • azelastine 205.5 mcg (0.15 %) spray,non-aerosol Administer into affected nostril(s) twice a day.     • budesonide (Pulmicort) 0.5 mg/2 mL nebulizer solution Inhale 2 mL (0.5 mg) 2 times a day.     • CALCIUM CARBONATE-VITAMIN D3 ORAL Take 1 tablet by mouth once daily.     • coenzyme Q-10 100 mg capsule Take 1 capsule (100 mg) by mouth.     • cranberry 500 mg capsule      • escitalopram (Lexapro) 5 mg tablet Take one tablet qpm 90 tablet 1   • estradiol (Estrace) 0.01 % (0.1 mg/gram) vaginal cream      • famotidine (Pepcid) 20 mg tablet Take one tablet in empty stomach in amand one table in empty stomach in the afternoon 180 tablet 1   • ferrous sulfate  mg ER tablet Take 1 tablet by mouth once daily.     • fluoride, sodium, (PreviDent) 1.1 % gel Apply to teeth.     • folic acid (Folvite) 1 mg tablet Take 1 tablet (1 mg) by mouth once daily.     • ibuprofen (Motrin) capsule Take 3 capsules (600 mg) by mouth once daily as needed.     • immune globulin, human, (Privigen) 10 % solution infusion orally every 3 weeks     • ipratropium  "(Atrovent) 42 mcg (0.06 %) nasal spray Administer into affected nostril(s).     • levothyroxine (Tirosint) 75 mcg capsule Take by mouth.     • magnesium oxide (Mag-Ox) 250 mg magnesium tablet      • multivitamin (Multiple Vitamins) tablet Take 1 tablet by mouth once daily.     • potassium chloride ER (Micro-K) 10 mEq ER capsule Take one capsule daily with food 90 capsule 0   • psyllium (Metamucil) 0.4 gram capsule Take by mouth.     • rOPINIRole (Requip) 4 mg tablet Take 1 tablet (4 mg) by mouth.     • rosuvastatin 10 mg capsule, sprinkle Take 10 mg by mouth.     • tretinoin (Retin-A) 0.025 % cream Apply topically.     • vitamin E 450 mg (1000 unit) capsule Take by mouth once daily.       No current facility-administered medications on file prior to visit.        Allergies:   Allergies   Allergen Reactions   • Citalopram Unknown   • Doxepin Hives and Unknown   • Fluoxetine Hives and Unknown   • Nortriptyline Hives and Unknown   • Sertraline Unknown       Record Review: brief     Mental Status Evaluation:  Appearance: Appears to be stated age. Well groomed. Good hygiene.   Behavior/Attitude: Cooperative. Pleasant.    Motor: Psychomotor activity in average range. No abnormal involuntary movements.    Speech: Regular in rate, tone and volume. No pressure.   Mood: \"great\"  Affect: Congruent to stated mood. Mobilized appropriately. Normal range.    Thought process: Goal-directed. Linear. Organized.   Thought content: No paranoia, delusion or ideas of reference elicited. No hallucinations in auditory, visual or other sensory modalities.    Suicidal ideation: denied.   Homicidal ideation: denied.   Insight: Fair  Judgment: Fair  Orientation: oriented correctly to time, place and person.   Recent and remote memory: intact based on recall of recent and remote autobiographical memories.   Attention/concentration: intact during visit   Language: No aphasia or paraphasic errors during conversation    Fund of knowledge: Average "       Assessment/Plan  Assessment:   Ms. Abad Yan is a 73 y.o. female with a history of depression, primary immune deficiency, hypothyroidism, restless leg syndrome, hyperlipidemia. Patient is referred by Dr. Chen Calix (genetics) for psychiatric clearance for genetic testing.     She has a history of depression and anxiety with a couple of episodes but fairly stable recently on low dose of escitalopram. No history of quita, psychosis, impulsivity or substance use disorder. She does not have a history of suicide attempts.     Diagnosis:   Other depressive disorder, in remission    Treatment Plan/Recommendations:   - She is cleared for genetic testing from psychiatric standpoint.   - She and her  had questions about if they should communicate results with their family (their children as well as her brother's children), and if so, what is the best way to do so. Provided some general information but encouraged to discuss this further with Dr. Calix.   - Advised to let Dr. Calix know or contact me if she would like to schedule a follow up after she gets her test results.   - Contact me sooner if needed.     Review with patient: Treatment plan reviewed with the patient.    Alfreda Bacon MD

## 2024-01-30 ENCOUNTER — HOSPITAL ENCOUNTER (OUTPATIENT)
Dept: RADIOLOGY | Facility: HOSPITAL | Age: 74
Discharge: HOME | End: 2024-01-30
Payer: MEDICARE

## 2024-01-30 DIAGNOSIS — B83.0 VISCERAL LARVA MIGRANS: ICD-10-CM

## 2024-01-30 DIAGNOSIS — J47.9 BRONCHIECTASIS, UNCOMPLICATED (MULTI): ICD-10-CM

## 2024-01-30 PROCEDURE — 71250 CT THORAX DX C-: CPT | Performed by: RADIOLOGY

## 2024-01-30 PROCEDURE — 71250 CT THORAX DX C-: CPT

## 2024-01-31 ENCOUNTER — TELEPHONE (OUTPATIENT)
Dept: SURGERY | Facility: CLINIC | Age: 74
End: 2024-01-31
Payer: MEDICARE

## 2024-01-31 DIAGNOSIS — D37.8 NEOPLASM OF UNCERTAIN BEHAVIOR OF PANCREAS: ICD-10-CM

## 2024-01-31 DIAGNOSIS — K86.2 PANCREATIC CYST (HHS-HCC): ICD-10-CM

## 2024-01-31 NOTE — TELEPHONE ENCOUNTER
I returned the pt msg and called and spoke to her. She had her CT scan of the chest and she was instructed that  would still want the MRI pancreas she has scheduled. I also instructed her that she needs to repeat her Ca 19-9 lab test prior to her appt. She voiced understanding.

## 2024-02-01 ENCOUNTER — LAB (OUTPATIENT)
Dept: LAB | Facility: LAB | Age: 74
End: 2024-02-01
Payer: MEDICARE

## 2024-02-01 ENCOUNTER — TELEPHONE (OUTPATIENT)
Dept: PULMONOLOGY | Facility: HOSPITAL | Age: 74
End: 2024-02-01
Payer: MEDICARE

## 2024-02-01 DIAGNOSIS — J47.0 BRONCHIECTASIS WITH ACUTE LOWER RESPIRATORY INFECTION (MULTI): ICD-10-CM

## 2024-02-01 PROCEDURE — 87075 CULTR BACTERIA EXCEPT BLOOD: CPT

## 2024-02-01 PROCEDURE — 87070 CULTURE OTHR SPECIMN AEROBIC: CPT

## 2024-02-01 PROCEDURE — 87205 SMEAR GRAM STAIN: CPT

## 2024-02-01 NOTE — TELEPHONE ENCOUNTER
Mrs. Karan Yan called stating that she had a CT chest on 1/30/24 that was ordered by [facility]. Based on results she was instructed to reach out to Dr. Gil due to some noticed changes. She would like Dr. Gil to look at results of the CT and let her know if there is anything that needs to be done. She has a FUV with Dr. Gil on 4/2/24. She also mentioned that she does notice more SOB when she is hiking, but feels fine doing everyday activities. Dr. Gil was notified.     2/1/24 1103 Per Dr. Gil pt needs respiratory culture and AFB culture to rule out infection. Orders placed. Pt was updated on plan of care and she verbalized understanding. She stated that she isn't coughing and doesn't think she will be able to produce sputum for the tests. She stated that she sees an Infectious Disease physician at [facility] and has been trying to get him a culture for the last month. She also mentioned that she has been cultured for MAC and it was negative. Dr. Gil notified. Per Dr. Gil, if she is feeling good then she can follow up at her April visit, but can get her in sooner if she is having symptoms. This was discussed with the pt. She stated that she is feeling good and will wait until April for her follow up visit.

## 2024-02-02 ENCOUNTER — LAB (OUTPATIENT)
Dept: LAB | Facility: LAB | Age: 74
End: 2024-02-02
Payer: MEDICARE

## 2024-02-02 DIAGNOSIS — J47.0 BRONCHIECTASIS WITH ACUTE LOWER RESPIRATORY INFECTION (MULTI): ICD-10-CM

## 2024-02-02 PROCEDURE — 87206 SMEAR FLUORESCENT/ACID STAI: CPT

## 2024-02-02 PROCEDURE — 87116 MYCOBACTERIA CULTURE: CPT

## 2024-02-02 PROCEDURE — 87015 SPECIMEN INFECT AGNT CONCNTJ: CPT

## 2024-02-03 LAB
BACTERIA SPEC RESP CULT: NORMAL
GRAM STN SPEC: NORMAL
GRAM STN SPEC: NORMAL

## 2024-02-09 ENCOUNTER — TELEMEDICINE (OUTPATIENT)
Dept: GENETICS | Facility: CLINIC | Age: 74
End: 2024-02-09

## 2024-02-09 DIAGNOSIS — Z82.0 FAMILY HISTORY OF NEUROLOGICAL DISEASE: Primary | ICD-10-CM

## 2024-02-09 PROCEDURE — 99404 PREV MED CNSL INDIV APPRX 60: CPT | Performed by: MEDICAL GENETICS

## 2024-02-09 PROCEDURE — 1126F AMNT PAIN NOTED NONE PRSNT: CPT | Performed by: MEDICAL GENETICS

## 2024-02-09 PROCEDURE — 1159F MED LIST DOCD IN RCRD: CPT | Performed by: MEDICAL GENETICS

## 2024-02-09 PROCEDURE — 1036F TOBACCO NON-USER: CPT | Performed by: MEDICAL GENETICS

## 2024-02-09 PROCEDURE — 1157F ADVNC CARE PLAN IN RCRD: CPT | Performed by: MEDICAL GENETICS

## 2024-02-09 NOTE — PROGRESS NOTES
[facility] PATIENT VISIT    Patient ID: Abad Yan is a 73 y.o. female who presents for a follow up visit.    Accompanied by .     This visit was completed via Telehealth. All issues as below were discussed and addressed but no physical exam was performed. If it was felt that the patient should be evaluated in clinic then they were directed there. The patient verbally consented to the visit and participated from her home in [place].    HPI  Ms. Karan Yan is a 73-year-old female whose brother, age 81, was recently diagnosed with frontotemporal dementia. There are other relatives with dementia, but none specifically with frontotemporal dementia.  She was referred for genetic evaluation, after Dr. Johanny Gutierrez contacted my office. She is currently asymptomatic. She was last seen on  9/28/2023. She returns today virtually with her  for the results of the [facility] Frontotemporal [facility]ementia panel.    [facility] Frontotemporal Dementia, report date 02/07/24  RESULT: NEGATIVE (16 total genes tested, including [id number])  [id number] Hexanucleotide repeats detected: 5, 2 heterozygous Normal Range (fewer than 30 repeats in each copy of the gene is considered normal)    Interval history:  Had immunodeficiency testing in the past, ordered by Dr. Gutierrez, and she was found to have 3 recessive genes, per patient. Unclear if heterozygous carrier or homozygous affected.  The report was not available at the time of the virtual visit.    Family History Updates:  No family history of frontotemporal dementia on her children's paternal side.   Onset of brother's symptoms began around 77-78 yr. Before this, he had normal mental status.    Assessment/Plan     It was a pleasure to speak with you virtually today.     I am very pleased that your results were negative for all 16 genes tested.    There are a few scenarios in which you could receive these negative results, 2 of which are favorable for you:    1) The  "frontotemporal dementia in your family may not be strongly genetic.  Only about 40% of frontotemporal dementia is familial, meaning that 60% is not strongly familial.  Rarely, there may be a new gene change in the affected person despite the fact that this has not previously been \"familial” in the family.  If that were the case, the only individuals at significant risk would be your brother's own children, not you or your children.     Your brother's children's risk has decreased a little since your testing was negative although his children are still at risk because he is affected. (That is, your negative results mathematically somewhat decrease the chance that the two of you have a parent with the mutation, but does not eliminate this risk or the possibility that he could have a new mutation that was not inherited from a parent). There could also be weak genetic influences or risk factors at play in your brother's dementia (that you could theoretically share), but these would not necessarily be sufficient to cause you to have the same condition.    2) Your brother may have a genetic form of frontotemporal dementia that you did not inherit.  This could be a gene change that is new in him, or he could have inherited it from a parent while you did not. The only way to prove this would be to test him.    3) it is possible that your brother could have a rare genetic form of frontotemporal dementia for which genetic testing has not been done for you, either because the gene was not included on the [facility] panel (which is competitive with other panels from other labs as far as thoroughness) or has yet to be discovered.  Of clearly familial cases, the genes [id number], MAPT and GRN account for about 30%, 15% and 25% of cases respectively, totaling 70%.  These were tested and ruled out for you.  Thus, more than two-thirds of people with the familial form of FTD would have an identifiable genetic cause with the available " "genetic testing.  This greatly decreases the after-test probability that you could have a FTD-related mutation.     Weighing these factors, overall, it is difficult to say whether you are at lower or higher risk than the general population.  (We discussed that the prevalence of frontotemporal dementia in the entire population is 15-22 per 100,000 people according to a 2013 study, and this number was still cited in 2022 by a resource known as Stat Pearls. Unlike \"incidence,\" prevalence does not count people who are diagnosed and then quickly pass away.  Incidence appears to be less well-studied, but when studied, seemed similar to prevalence for FTD.) You do have a brother with frontotemporal dementia (which still confers some amount of risk, even if he is the only one in the family with this), but you have tested negative for a gene ([id number]) for which it is not rare for a mutation to appear in a person even without a family history, and negative for 15 other genes.  I would conclude that you are still at modest risk due to your family history, but you have had testing that most people have not had, so this could be a reason for you to feel more secure.  Even if your risk is actually higher than the general population risk, as you can see above, the general population risk may not be perceived as very high.  Also, if a person lives into their early 90s without symptoms of FTD, the chance of having a genetic form drops even further.    If your brother were to tested and was positive for one of the genetic disorders tested, his children would then be able to seek predictive testing to see if they inherited this disorder.  (If he has a positive result, each of his children would have a 50% chance to inherit the gene change.)  If your brother tests negative, then his children would not be recommended to be tested, but they would still be at some risk due to their father's condition (see scenario #3 above).  If your " brother were to have a positive test for a gene for which you were already tested, then your risk would drop further.    If your brother does not wish to be tested, his children could still be tested.  If your brother's children have negative results on this type of panel but their father has not been tested, we would not be able to determine if their father still does have a gene change that they did not inherit. If at least one of his children has a positive test, it would be likely that they inherited this genetic condition from their father, but we would not be able to absolutely prove their father's gene status without directly testing him.     Another option (either instead of testing him or after negative results) would be to have your brother's DNA stored so his children could use it at a later time, with the proper consents. If your brother's wife is his healthcare power of , she would be asked to make decisions on behalf of her  consistent with his previously expressed values and wishes, but she would be the one consenting to genetic testing or DNA storage.     I looked up some additional information after the visit in response to your questions:    Stat Pearls can be accessed here:  https://www.ncbi.nlm.nih.gov/books/KRN152816/    You asked about age of onset.  Stat Pearls, which is evidence-based, peer-reviewed, and generally current, cites a general age range of 45 to 65, with average age of 58.  (The statistic will include both familial and non-familial cases.)  Other resources confirm that age range, noting that about 60% of patients will fall into the 45 to 65-year-old age range.  That means that 4 out of 10 are either younger or older than this.  For specific genetic forms, age ranges vary by form.  For example, [employer] cites an age range for MAPT of 17-82, k0dmu76 of 20-91, and GRN of 25-90 years.    One 2017 study, although it was conducted exclusively on [place],  "addresses the question of the difference between age of onset in familial versus sporadic cases:    Ana GARCIA et al. Clinical and genetic analyses of familial and sporadic frontotemporal dementia patients in [facility]. Alzheimers Curtis. 2017 Aug;13(5):959-188. doi: 10.1016/j.jalz.2017.01.011. Epub 2017 Mar 3. PMID: [id number]; PMCID: [id number].    \"Mean age at onset was 64.5 and 62.5 years in familial and sporadic cases, respectively, with a wide range of variability in both groups.\"  This was not considered a significant difference.    Therefore, I do not think that we can use your brother's age of onset to infer anything about a genetic versus sporadic form.  It is possible that if we knew more about what subtype he had clinically, that this might correlate with the risk for certain genetic forms, but I would likely need to see him as a patient and review his records to have that information.    In summary, while I cannot guarantee you that you will remain free of frontotemporal dementia, I am very encouraged by your negative test results and by the fact that your brother remains the sole diagnosed individual in the family, suggesting that a strongly genetic form may not exist in your family.  As we discussed, other common health conditions may pose a greater risk to your health and I recommend you focus on modifiable risk factors and continuing to live a healthy lifestyle.    Plan:  I would not advise testing your children, because no mutation was found in you. Whatever your modest residual risk is, your children's risk would be one half of that, unless there are risk factors on their father's side of the family (you are not aware of any affected individuals on that side of the family).    Assuming that your brother has not had a positive test in the meantime, you may wish to call my office in 3 to 5 years to see if the landscape of genetic testing has significantly changed.  If a new gene or genes " "accounting for significant proportion of FTD cases has been discovered, you may wish to consider additional testing if you feel that would be helpful.    Your brother, who lives in [place], would be able to see me for testing by a telemedicine or in-person appointment. The telemedicine option may not be available in 2025.  At the visit, your brother, with assistance from his wife who would be making medical decisions, would be offered genetic testing and/or DNA banking.  Genetic testing can be done via cheek swab.  DNA banking is usually done via blood.    I will look into Clinicaltrials.gov for studies for people who are the unaffected relatives of people with FTD and are gene-negative.  Addendum 2/10/2024:  Thank you for your willingness to contribute to medical research.  I conducted a search using the search terms \"frontotemporal dementia\" and \"family members.\"  1 study only accepted healthy family members if their affected relative was enrolled in the study, which makes sense if they are doing a comparison.  Another study was only recruiting family members if their relative had a confirmed gene mutation, or the family history suggested autosomal dominant inheritance, and neither scenario applies to you.  You are welcome to check the website \"Clinicaltrials.gov\" periodically using these or similar search terms.  Checking the boxes limiting responses to \"not yet recruiting\" or \" active, recruiting\" can also help limit the number of results if you use other search terms and get a long list.  But it seems like studies may be less interested in relatives when there is not a strong family history.      I will ask how to get a “glass ceiling” placed on your notes related to FTD genetic testing and/or have your notes labeled as a sensitive documentation.  (Addendum 2/10/2024: I have been able to select the box marking this note as sensitive documentation.  I cannot do that for you our last visit note, as that note was not " originally generated in Epic.)    At your request for your questions regarding your privacy concerns:  compliance and ethics resources: [phone]  email: compliance@[email or web address].org. This is the internal number, not a third party hotline.     Please send me a message in [facility] with the results of your immune deficiency genetic testing. I will then let you know whether you should schedule a visit with me.     Please note that I will be out of the office and unable to reply from 2/13 through 2/19.    I will mail you this note and the previous genetics note.  Your test results are accessible through [facility].       If you have any questions, please call Estefany Blanco in the [facility] at [phone] option 1 or send me a non-urgent message through [facility].     Chen Morales MD  Clinical     Visit Time: 1:03 - 2:03    Documentation Time: 2:25 - 2:34    Scribe Attestation  By signing my name below, ISherry, Scribe   attest that this documentation has been prepared under the direction and in the presence of Chen Morales MD.

## 2024-02-12 ENCOUNTER — OFFICE VISIT (OUTPATIENT)
Dept: PRIMARY CARE | Facility: CLINIC | Age: 74
End: 2024-02-12
Payer: MEDICARE

## 2024-02-12 VITALS
HEIGHT: 67 IN | DIASTOLIC BLOOD PRESSURE: 76 MMHG | BODY MASS INDEX: 19.27 KG/M2 | WEIGHT: 122.8 LBS | TEMPERATURE: 98 F | OXYGEN SATURATION: 96 % | RESPIRATION RATE: 16 BRPM | SYSTOLIC BLOOD PRESSURE: 118 MMHG | HEART RATE: 68 BPM

## 2024-02-12 DIAGNOSIS — G43.909 MIGRAINE SYNDROME: Primary | ICD-10-CM

## 2024-02-12 DIAGNOSIS — D83.9 COMMON VARIABLE IMMUNODEFICIENCY (MULTI): ICD-10-CM

## 2024-02-12 DIAGNOSIS — J01.91 ACUTE RECURRENT SINUSITIS, UNSPECIFIED LOCATION: Primary | ICD-10-CM

## 2024-02-12 PROBLEM — R07.9 CHEST PAIN: Status: RESOLVED | Noted: 2024-02-12 | Resolved: 2024-02-12

## 2024-02-12 PROBLEM — I83.90 VARICOSE VEINS OF LOWER EXTREMITY: Status: ACTIVE | Noted: 2022-05-10

## 2024-02-12 PROBLEM — B83.0: Status: ACTIVE | Noted: 2024-02-12

## 2024-02-12 PROBLEM — M25.539 PAIN IN WRIST: Status: RESOLVED | Noted: 2024-02-12 | Resolved: 2024-02-12

## 2024-02-12 PROBLEM — R21 RASH: Status: RESOLVED | Noted: 2024-02-12 | Resolved: 2024-02-12

## 2024-02-12 PROBLEM — H90.42 SENSORINEURAL HEARING LOSS (SNHL) OF LEFT EAR WITH UNRESTRICTED HEARING OF RIGHT EAR: Status: RESOLVED | Noted: 2023-04-06 | Resolved: 2024-02-12

## 2024-02-12 PROBLEM — H91.93 BILATERAL HEARING LOSS: Status: RESOLVED | Noted: 2023-09-13 | Resolved: 2024-02-12

## 2024-02-12 PROBLEM — R11.0 NAUSEA: Status: RESOLVED | Noted: 2024-02-12 | Resolved: 2024-02-12

## 2024-02-12 PROBLEM — R23.3 BRUISES EASILY: Status: RESOLVED | Noted: 2024-02-12 | Resolved: 2024-02-12

## 2024-02-12 PROBLEM — G89.29 CHRONIC RIGHT SHOULDER PAIN: Status: RESOLVED | Noted: 2023-09-13 | Resolved: 2024-02-12

## 2024-02-12 PROBLEM — K52.9 CHRONIC DIARRHEA OF UNKNOWN ORIGIN: Status: RESOLVED | Noted: 2023-09-13 | Resolved: 2024-02-12

## 2024-02-12 PROBLEM — M25.559 ARTHRALGIA OF HIP: Status: RESOLVED | Noted: 2024-02-12 | Resolved: 2024-02-12

## 2024-02-12 PROBLEM — S52.134A NONDISPLACED FRACTURE OF NECK OF RIGHT RADIUS, INITIAL ENCOUNTER FOR CLOSED FRACTURE: Status: RESOLVED | Noted: 2019-05-08 | Resolved: 2024-02-12

## 2024-02-12 PROBLEM — R25.2 CRAMPS OF LOWER EXTREMITY: Status: RESOLVED | Noted: 2024-02-12 | Resolved: 2024-02-12

## 2024-02-12 PROBLEM — Z86.79 HISTORY OF CARDIOVASCULAR DISORDER: Status: RESOLVED | Noted: 2024-02-12 | Resolved: 2024-02-12
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
## 2024-02-12 PROBLEM — A49.8 PSEUDOMONAS AERUGINOSA INFECTION: Status: RESOLVED | Noted: 2024-02-12 | Resolved: 2024-02-12

## 2024-02-12 PROBLEM — M25.511 CHRONIC RIGHT SHOULDER PAIN: Status: RESOLVED | Noted: 2023-09-13 | Resolved: 2024-02-12

## 2024-02-12 PROCEDURE — 1157F ADVNC CARE PLAN IN RCRD: CPT | Performed by: INTERNAL MEDICINE

## 2024-02-12 PROCEDURE — 99213 OFFICE O/P EST LOW 20 MIN: CPT | Performed by: INTERNAL MEDICINE

## 2024-02-12 PROCEDURE — 1159F MED LIST DOCD IN RCRD: CPT | Performed by: INTERNAL MEDICINE

## 2024-02-12 PROCEDURE — 1126F AMNT PAIN NOTED NONE PRSNT: CPT | Performed by: INTERNAL MEDICINE

## 2024-02-12 PROCEDURE — 1036F TOBACCO NON-USER: CPT | Performed by: INTERNAL MEDICINE

## 2024-02-12 RX ORDER — AMOXICILLIN AND CLAVULANATE POTASSIUM 875; 125 MG/1; MG/1
TABLET, FILM COATED ORAL
Qty: 20 TABLET | Refills: 0 | Status: SHIPPED | OUTPATIENT
Start: 2024-02-12 | End: 2024-03-14 | Stop reason: ALTCHOICE

## 2024-02-12 ASSESSMENT — ENCOUNTER SYMPTOMS
COUGH: 1
SORE THROAT: 1
NAUSEA: 0
DIARRHEA: 0
FATIGUE: 1

## 2024-02-12 ASSESSMENT — PAIN SCALES - GENERAL: PAINLEVEL: 0-NO PAIN

## 2024-02-12 NOTE — PROGRESS NOTES
"Subjective   Patient ID: ELIANE Yan is a 73 y.o. female who presents for Cough, Sore Throat, Headache, and Fatigue.  1 week ago started with more fatigue, sinus headaches, 3 days ago sore throat, increase cough from posterior drainage.  Otc ibuprofen  Covid negative 2 days ago and today.  Review of Systems   Constitutional:  Positive for fatigue.   HENT:  Positive for sore throat.    Respiratory:  Positive for cough.    Gastrointestinal:  Negative for diarrhea and nausea.   All other systems reviewed and are negative.      Objective   /76 (BP Location: Left arm, Patient Position: Sitting)   Pulse 68   Temp 36.7 °C (98 °F) (Temporal)   Resp 16   Ht 1.7 m (5' 6.93\")   Wt 55.7 kg (122 lb 12.7 oz)   LMP  (LMP Unknown)   SpO2 96%   BMI 19.27 kg/m²     Physical Exam  General- non toxic, no respiratory distress  Eyes- Conjunctiva clear  Ears- TMs clear, no erythema, no fluid, TMs not retracted or bulging  Nose- external normal, turbinates free of swelling, erythema, and drainage, no sinus tenderness  Throat- mucus membranes moist, no erythema,  no exudates or lesions  Neck- nontender, no enlarged cervical lymphadenopathy  Cardiac- regular rate and rhythm.  Lung- clear to auscultation, no rales, no rhonchi, no wheezing  GI-  nontender, nondistended.  skin no new lesions  Extremities- no edema  Neuro- non focal, oriented x 3  Psychiatric- no hallucinations    Assessment/Plan   Problem List Items Addressed This Visit             ICD-10-CM       ENT    Acute recurrent sinusitis - Primary J01.91    Relevant Medications    amoxicillin-pot clavulanate (Augmentin) 875-125 mg tablet       Multi-system (Lupus, Sarcoid)    Common variable immunodeficiency (CMS/HCC) D83.9          "

## 2024-02-13 ENCOUNTER — TELEPHONE (OUTPATIENT)
Dept: PULMONOLOGY | Facility: HOSPITAL | Age: 74
End: 2024-02-13
Payer: MEDICARE

## 2024-02-13 DIAGNOSIS — J47.0 BRONCHIECTASIS WITH ACUTE LOWER RESPIRATORY INFECTION (MULTI): ICD-10-CM

## 2024-02-13 DIAGNOSIS — J47.9 BRONCHIECTASIS WITHOUT COMPLICATION (MULTI): ICD-10-CM

## 2024-02-13 NOTE — TELEPHONE ENCOUNTER
Returned pt's call regarding a refill on her albuterol nebulizer solution. Refill order placed and routed to Dr. Gil to sign. Pt verified pharmacy. Her sputum samples were also discussed. It was explained that the respiratory smear was normal, but the AFB sample is still in progress for another few weeks. She verbalized understanding.

## 2024-02-16 RX ORDER — SUMATRIPTAN 50 MG/1
TABLET, FILM COATED ORAL
Qty: 9 TABLET | Refills: 3 | Status: SHIPPED | OUTPATIENT
Start: 2024-02-16 | End: 2024-03-18

## 2024-02-16 RX ORDER — ALBUTEROL SULFATE 0.83 MG/ML
2.5 SOLUTION RESPIRATORY (INHALATION) 2 TIMES DAILY
Qty: 540 ML | Refills: 3 | Status: SHIPPED | OUTPATIENT
Start: 2024-02-16

## 2024-02-16 RX ORDER — SUMATRIPTAN 50 MG/1
50 TABLET, FILM COATED ORAL ONCE AS NEEDED
COMMUNITY
End: 2024-02-16 | Stop reason: SDUPTHER

## 2024-02-20 ENCOUNTER — APPOINTMENT (OUTPATIENT)
Dept: GENETICS | Facility: HOSPITAL | Age: 74
End: 2024-02-20
Payer: MEDICARE

## 2024-02-20 ENCOUNTER — APPOINTMENT (OUTPATIENT)
Dept: GENETICS | Facility: HOSPITAL | Age: 74
End: 2024-02-20

## 2024-02-21 ENCOUNTER — LAB (OUTPATIENT)
Dept: LAB | Facility: LAB | Age: 74
End: 2024-02-21
Payer: MEDICARE

## 2024-02-21 DIAGNOSIS — D37.8 NEOPLASM OF UNCERTAIN BEHAVIOR OF PANCREAS: ICD-10-CM

## 2024-02-21 DIAGNOSIS — K86.2 PANCREATIC CYST (HHS-HCC): ICD-10-CM

## 2024-02-21 LAB
ANION GAP SERPL CALC-SCNC: 8 MMOL/L (ref 10–20)
BUN SERPL-MCNC: 14 MG/DL (ref 6–23)
CALCIUM SERPL-MCNC: 9.1 MG/DL (ref 8.6–10.6)
CANCER AG19-9 SERPL-ACNC: 121.5 U/ML
CHLORIDE SERPL-SCNC: 106 MMOL/L (ref 98–107)
CO2 SERPL-SCNC: 28 MMOL/L (ref 21–32)
CREAT SERPL-MCNC: 0.87 MG/DL (ref 0.5–1.05)
EGFRCR SERPLBLD CKD-EPI 2021: 70 ML/MIN/1.73M*2
GLUCOSE SERPL-MCNC: 94 MG/DL (ref 74–99)
POTASSIUM SERPL-SCNC: 4.1 MMOL/L (ref 3.5–5.3)
SODIUM SERPL-SCNC: 138 MMOL/L (ref 136–145)

## 2024-02-21 PROCEDURE — 86301 IMMUNOASSAY TUMOR CA 19-9: CPT

## 2024-02-21 PROCEDURE — 36415 COLL VENOUS BLD VENIPUNCTURE: CPT

## 2024-02-21 PROCEDURE — 80048 BASIC METABOLIC PNL TOTAL CA: CPT

## 2024-02-26 NOTE — PROGRESS NOTES
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 The dominant lesion is 1.8 cm. There are no worrisome features or high risk stigmata such as pancreatic ductal dilatation or a solid nodular enhancing component. Her 19â€“9 a year ago was 79. 6 months ago it was 108. Now it is 114.     Again she has been reviewed multiple times at our weekly pancreas conference and that happened 2 days ago for her most recent imaging. Given the stability of her MRI over 6 months x 2 we feel that we can extend her imaging surveillance out to a year. There is no imaging correlate to her 19â€“9, but we will check that with her next MRI as well.     I have let this patient know that should she develop abdominal pain, diabetes, jaundice, or unexpected weight loss before her next MRI to let me know.     This patient will call the office next year for her MRI and office visit.    ------    19-9 = 122 (see priors above)    MRI February 2024:      PE:    Impression / Plan:

## 2024-02-27 ENCOUNTER — TELEPHONE (OUTPATIENT)
Dept: SURGERY | Facility: CLINIC | Age: 74
End: 2024-02-27
Payer: MEDICARE

## 2024-02-27 ENCOUNTER — APPOINTMENT (OUTPATIENT)
Dept: RADIOLOGY | Facility: HOSPITAL | Age: 74
End: 2024-02-27
Payer: MEDICARE

## 2024-02-27 DIAGNOSIS — K86.2 PANCREATIC CYST (HHS-HCC): ICD-10-CM

## 2024-02-27 NOTE — TELEPHONE ENCOUNTER
I called the pt as I noticed she did not have her MRI. I spoke to her and she has cold symptoms and under the weather so she had to cancel her MRI. I gave her the phone number to reschedule and she will call our office when she has a dare for her new follow up OV to review the results. She voiced understanding

## 2024-03-01 ENCOUNTER — OFFICE VISIT (OUTPATIENT)
Dept: PRIMARY CARE | Facility: CLINIC | Age: 74
End: 2024-03-01
Payer: MEDICARE

## 2024-03-01 ENCOUNTER — LAB (OUTPATIENT)
Dept: LAB | Facility: LAB | Age: 74
End: 2024-03-01
Payer: MEDICARE

## 2024-03-01 ENCOUNTER — HOSPITAL ENCOUNTER (OUTPATIENT)
Dept: RADIOLOGY | Facility: HOSPITAL | Age: 74
Discharge: HOME | End: 2024-03-01
Payer: MEDICARE

## 2024-03-01 ENCOUNTER — APPOINTMENT (OUTPATIENT)
Dept: SURGERY | Facility: CLINIC | Age: 74
End: 2024-03-01
Payer: MEDICARE

## 2024-03-01 VITALS
DIASTOLIC BLOOD PRESSURE: 49 MMHG | RESPIRATION RATE: 16 BRPM | OXYGEN SATURATION: 96 % | HEIGHT: 66 IN | HEART RATE: 63 BPM | SYSTOLIC BLOOD PRESSURE: 110 MMHG | WEIGHT: 126.54 LBS | BODY MASS INDEX: 20.34 KG/M2 | TEMPERATURE: 97 F

## 2024-03-01 DIAGNOSIS — R51.9 WORSENING HEADACHES: Primary | ICD-10-CM

## 2024-03-01 DIAGNOSIS — D83.9 COMMON VARIABLE IMMUNODEFICIENCY (MULTI): ICD-10-CM

## 2024-03-01 DIAGNOSIS — R51.9 WORSENING HEADACHES: ICD-10-CM

## 2024-03-01 PROBLEM — J47.1 BRONCHIECTASIS WITH ACUTE EXACERBATION (MULTI): Status: RESOLVED | Noted: 2023-09-13 | Resolved: 2024-03-01

## 2024-03-01 LAB
CRP SERPL-MCNC: 2.91 MG/DL
ERYTHROCYTE [DISTWIDTH] IN BLOOD BY AUTOMATED COUNT: 12.8 % (ref 11.5–14.5)
ERYTHROCYTE [SEDIMENTATION RATE] IN BLOOD BY WESTERGREN METHOD: 14 MM/H (ref 0–30)
HCT VFR BLD AUTO: 37.8 % (ref 36–46)
HGB BLD-MCNC: 12.1 G/DL (ref 12–16)
MCH RBC QN AUTO: 30.6 PG (ref 26–34)
MCHC RBC AUTO-ENTMCNC: 32 G/DL (ref 32–36)
MCV RBC AUTO: 96 FL (ref 80–100)
NRBC BLD-RTO: ABNORMAL /100{WBCS}
PLATELET # BLD AUTO: 275 X10*3/UL (ref 150–450)
RBC # BLD AUTO: 3.95 X10*6/UL (ref 4–5.2)
WBC # BLD AUTO: 6.9 X10*3/UL (ref 4.4–11.3)

## 2024-03-01 PROCEDURE — 1157F ADVNC CARE PLAN IN RCRD: CPT | Performed by: INTERNAL MEDICINE

## 2024-03-01 PROCEDURE — 99213 OFFICE O/P EST LOW 20 MIN: CPT | Performed by: INTERNAL MEDICINE

## 2024-03-01 PROCEDURE — 1125F AMNT PAIN NOTED PAIN PRSNT: CPT | Performed by: INTERNAL MEDICINE

## 2024-03-01 PROCEDURE — 36415 COLL VENOUS BLD VENIPUNCTURE: CPT

## 2024-03-01 PROCEDURE — 70470 CT HEAD/BRAIN W/O & W/DYE: CPT

## 2024-03-01 PROCEDURE — 1159F MED LIST DOCD IN RCRD: CPT | Performed by: INTERNAL MEDICINE

## 2024-03-01 PROCEDURE — 1036F TOBACCO NON-USER: CPT | Performed by: INTERNAL MEDICINE

## 2024-03-01 PROCEDURE — 85652 RBC SED RATE AUTOMATED: CPT

## 2024-03-01 PROCEDURE — 2550000001 HC RX 255 CONTRASTS: Performed by: INTERNAL MEDICINE

## 2024-03-01 PROCEDURE — 85027 COMPLETE CBC AUTOMATED: CPT

## 2024-03-01 PROCEDURE — 86140 C-REACTIVE PROTEIN: CPT

## 2024-03-01 RX ORDER — LEVOFLOXACIN 750 MG/1
750 TABLET ORAL DAILY
COMMUNITY
Start: 2024-02-19 | End: 2024-03-14 | Stop reason: ALTCHOICE

## 2024-03-01 RX ADMIN — IOHEXOL 50 ML: 350 INJECTION, SOLUTION INTRAVENOUS at 14:38

## 2024-03-01 ASSESSMENT — COLUMBIA-SUICIDE SEVERITY RATING SCALE - C-SSRS
1. IN THE PAST MONTH, HAVE YOU WISHED YOU WERE DEAD OR WISHED YOU COULD GO TO SLEEP AND NOT WAKE UP?: NO
6. HAVE YOU EVER DONE ANYTHING, STARTED TO DO ANYTHING, OR PREPARED TO DO ANYTHING TO END YOUR LIFE?: NO
2. HAVE YOU ACTUALLY HAD ANY THOUGHTS OF KILLING YOURSELF?: NO

## 2024-03-01 ASSESSMENT — ENCOUNTER SYMPTOMS
SPEECH DIFFICULTY: 0
OCCASIONAL FEELINGS OF UNSTEADINESS: 0
DEPRESSION: 0
DIZZINESS: 0
LOSS OF SENSATION IN FEET: 0
FEVER: 0
CHILLS: 0
WEAKNESS: 0

## 2024-03-01 ASSESSMENT — PAIN SCALES - GENERAL: PAINLEVEL: 8

## 2024-03-01 NOTE — ASSESSMENT & PLAN NOTE
Pending ct sinus/head. Concern for viral meningitis. Advice for her to reach her ID provider at [facility]

## 2024-03-01 NOTE — PROGRESS NOTES
"Subjective   Patient ID: ELIANE Yan is a 73 y.o. female who presents for migraines  HPI over 10 days ago she started with intense sinus headache with light sensitivity, nausea, without any improvement with sumatriptan early in the day, ibuprofen 600 every 6 h. However infrequent no daily.  Initially did not wake up with headache , lately it does.  She was seeing her on feb 12, here started augmentin for sinus infection but change to levofloxacin on feb 19  by ID provider, still on it.  Hydration about 60 oz  Sleeps well no waking up  Review of Systems   Constitutional:  Negative for chills and fever.   Neurological:  Negative for dizziness, speech difficulty and weakness.        Denies focal neuro deficit, vision changes,    All other systems reviewed and are negative.      Objective   BP (!) 110/49 (BP Location: Left arm, Patient Position: Sitting, BP Cuff Size: Adult)   Pulse 63   Temp 36.1 °C (97 °F)   Resp 16   Ht 1.676 m (5' 6\")   Wt 57.4 kg (126 lb 8.7 oz)   LMP  (LMP Unknown)   SpO2 96%   BMI 20.42 kg/m²     Physical Exam  Eyes- Conjunctiva clear  No sinus tenderness, no reproducible TMJ tenderness, tms clear,  Neck-no thyromegaly  Cardiac- regular rate and rhythm, no murmurs  Lung- clear to auscultation  GI- present  bowel sounds, nontender, no rebound  MSK- no deformities  Extremities- no edema, good distal pulses  Neuro- non focal, oriented x 3, non meningeal signs, preserved gait,  Psychiatric- pleasant, well groomed, no hallucinations    Assessment/Plan   Problem List Items Addressed This Visit             ICD-10-CM    Worsening headaches - Primary R51.9     Pending ct sinus/head. Concern for viral meningitis. Advice for her to reach her ID provider at [facility]         Relevant Orders    CT head w and wo IV contrast    CBC    Sedimentation Rate    C-Reactive Protein    Common variable immunodeficiency (CMS/HCC) D83.9                 "

## 2024-03-01 NOTE — PATIENT INSTRUCTIONS
Please complete ct head/ communicate with your ID provider after results of  ct. Maintain good hydration, avoid prolong fasting, sleep well, avoid large amount of tylenol or ibuprofen.

## 2024-03-05 NOTE — PROGRESS NOTES
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 The dominant lesion is 1.8 cm. There are no worrisome features or high risk stigmata such as pancreatic ductal dilatation or a solid nodular enhancing component. Her 19â€“9 a year ago was 79. 6 months ago it was 108. Now it is 114.     Again she has been reviewed multiple times at our weekly pancreas conference and that happened 2 days ago for her most recent imaging. Given the stability of her MRI over 6 months x 2 we feel that we can extend her imaging surveillance out to a year. There is no imaging correlate to her 19â€“9, but we will check that with her next MRI as well.     I have let this patient know that should she develop abdominal pain, diabetes, jaundice, or unexpected weight loss before her next MRI to let me know.     This patient will call the office next year for her MRI and office visit.     ------     19-9 = 122 (see priors above)     MRI March 2024:  IMPRESSION:  Dominant unilocular pancreatic cyst presently measures 14 x 22 x 17 mm  When measured at similar locations in a similar fashion on the most  recent comparison exam, MRI/MRCP 8 March 2023, it measured no greater  than 14 x 18 x 14 mm  Note any discrepancy from previously reported size is due to   dependence. I have measured the dominant unilocular pancreatic cyst  myself in all three planes on both exams, comparing like to like  Just a few other subcentimeter simple pancreatic cysts are unchanged  No solid/enhancing pancreatic (or any other abdominal) mass suspect  for malignancy  No acute unexpected findings such as hydronephrosis or acute  Pancreatitis    ----------    No issues since last visit  with the exception of an evaluation for headaches.     Impression / Plan:    This patient has a dominant pancreatic cyst in the body/tail of her gland that we have been following for a couple of years.  She also has an elevated 19-9 noted above.  She has been reviewed at our weekly pancreas conference numerous times and we have never felt the need for endoscopic ultrasound evaluation.    There has been very slight growth between her last MRI and this MRI, perhaps a few millimeters.  There remains no obvious solid component or pancreatic ductal dilatation.  Her 19-9 is mildly elevated compared to the last 1 but probably within the error of repeat testing.    I will review this patient at our upcoming weekly pancreas imaging conference.  I suspect that we will simply continue recommending MRI surveillance however I will raise the issue of whether we should think about an endoscopic ultrasound evaluation as she has never had 1 and we have this persistent mildly elevated 19-9.    This patient will be in the doctor's office next Wednesday morning and we will call her next Wednesday afternoon after our conference review.    This note has been dictated with voice recognition software and has not been reviewed for grammar or content errors.    Update 3/13:  we reviewed at conference today.  At most a couple mm change in size over a couple a couple years.  No PD dil.  No solid component.  Group OK with ongoing annual surveillance.  EUS could be done as a baseline given 19-9 but unlikely to find anything/ based on good quality MRI.  Left  VM for pt to call back    Update 3/14:  able to speak to pt today.  Reviewed the above...annual MRI vs EUS.  Again, we are comfortable with the former.  Pt is as well and will call in a year for MRI and office visit.

## 2024-03-06 ENCOUNTER — HOSPITAL ENCOUNTER (OUTPATIENT)
Dept: RADIOLOGY | Facility: HOSPITAL | Age: 74
Discharge: HOME | End: 2024-03-06
Payer: MEDICARE

## 2024-03-06 DIAGNOSIS — K86.2 PANCREATIC CYST (HHS-HCC): ICD-10-CM

## 2024-03-06 PROCEDURE — 74183 MRI ABD W/O CNTR FLWD CNTR: CPT

## 2024-03-06 PROCEDURE — 2550000001 HC RX 255 CONTRASTS: Performed by: SURGERY

## 2024-03-06 PROCEDURE — 76377 3D RENDER W/INTRP POSTPROCES: CPT | Performed by: RADIOLOGY

## 2024-03-06 PROCEDURE — A9575 INJ GADOTERATE MEGLUMI 0.1ML: HCPCS | Performed by: SURGERY

## 2024-03-06 PROCEDURE — 74183 MRI ABD W/O CNTR FLWD CNTR: CPT | Performed by: RADIOLOGY

## 2024-03-06 RX ORDER — GADOTERATE MEGLUMINE 376.9 MG/ML
10 INJECTION INTRAVENOUS
Status: COMPLETED | OUTPATIENT
Start: 2024-03-06 | End: 2024-03-06

## 2024-03-06 RX ADMIN — GADOTERATE MEGLUMINE 10 ML: 376.9 INJECTION INTRAVENOUS at 09:24

## 2024-03-08 ENCOUNTER — OFFICE VISIT (OUTPATIENT)
Dept: SURGERY | Facility: CLINIC | Age: 74
End: 2024-03-08
Payer: MEDICARE

## 2024-03-08 VITALS
SYSTOLIC BLOOD PRESSURE: 109 MMHG | WEIGHT: 124 LBS | DIASTOLIC BLOOD PRESSURE: 66 MMHG | HEART RATE: 84 BPM | HEIGHT: 66 IN | BODY MASS INDEX: 19.93 KG/M2

## 2024-03-08 DIAGNOSIS — K86.2 PANCREATIC CYST (HHS-HCC): Primary | ICD-10-CM

## 2024-03-08 PROCEDURE — 1036F TOBACCO NON-USER: CPT | Performed by: SURGERY

## 2024-03-08 PROCEDURE — 1157F ADVNC CARE PLAN IN RCRD: CPT | Performed by: SURGERY

## 2024-03-08 PROCEDURE — 1159F MED LIST DOCD IN RCRD: CPT | Performed by: SURGERY

## 2024-03-08 PROCEDURE — 99212 OFFICE O/P EST SF 10 MIN: CPT | Performed by: SURGERY

## 2024-03-08 PROCEDURE — 1125F AMNT PAIN NOTED PAIN PRSNT: CPT | Performed by: SURGERY

## 2024-03-11 ENCOUNTER — OFFICE VISIT (OUTPATIENT)
Dept: OBSTETRICS AND GYNECOLOGY | Facility: CLINIC | Age: 74
End: 2024-03-11
Payer: MEDICARE

## 2024-03-11 VITALS
DIASTOLIC BLOOD PRESSURE: 68 MMHG | BODY MASS INDEX: 19.62 KG/M2 | SYSTOLIC BLOOD PRESSURE: 109 MMHG | WEIGHT: 125 LBS | HEART RATE: 83 BPM | HEIGHT: 67 IN

## 2024-03-11 DIAGNOSIS — F33.9 DEPRESSION, RECURRENT (CMS-HCC): Primary | ICD-10-CM

## 2024-03-11 DIAGNOSIS — E87.6 HYPOKALEMIA: ICD-10-CM

## 2024-03-11 DIAGNOSIS — N95.2 VAGINAL ATROPHY: ICD-10-CM

## 2024-03-11 DIAGNOSIS — N39.0 ACUTE LOWER UTI: Primary | ICD-10-CM

## 2024-03-11 DIAGNOSIS — N39.0 RECURRENT UTI (URINARY TRACT INFECTION): ICD-10-CM

## 2024-03-11 PROCEDURE — 99213 OFFICE O/P EST LOW 20 MIN: CPT | Performed by: OBSTETRICS & GYNECOLOGY

## 2024-03-11 PROCEDURE — 1036F TOBACCO NON-USER: CPT | Performed by: OBSTETRICS & GYNECOLOGY

## 2024-03-11 PROCEDURE — 1126F AMNT PAIN NOTED NONE PRSNT: CPT | Performed by: OBSTETRICS & GYNECOLOGY

## 2024-03-11 PROCEDURE — 1159F MED LIST DOCD IN RCRD: CPT | Performed by: OBSTETRICS & GYNECOLOGY

## 2024-03-11 PROCEDURE — 1157F ADVNC CARE PLAN IN RCRD: CPT | Performed by: OBSTETRICS & GYNECOLOGY

## 2024-03-11 RX ORDER — ESTRADIOL 0.1 MG/G
1 CREAM VAGINAL 2 TIMES WEEKLY
Qty: 42.5 G | Refills: 3 | Status: SHIPPED | OUTPATIENT
Start: 2024-03-11

## 2024-03-11 ASSESSMENT — PAIN SCALES - GENERAL: PAINLEVEL: 0-NO PAIN

## 2024-03-11 NOTE — PROGRESS NOTES
Urogynecology  Provider:  Chelsey Arroyo MD  [phone]              ASSESSMENT AND PLAN:     Problem List Items Addressed This Visit    None  Visit Diagnoses       Acute lower UTI    -  Primary    Relevant Orders    POCT UA Automated manually resulted    Recurrent UTI (urinary tract infection)        Relevant Orders    Urine Culture    Vaginal atrophy        Relevant Medications    estradiol (Estrace) 0.01 % (0.1 mg/gram) vaginal cream                  I spent a total of eConsult Time: 15 minutes in face to face and non face to face time.        Chelsey Arroyo MD    HISTORY OF PRESENT ILLNESS    Abad Melo a 73 y.o. here today for a follow up     Urinary symptoms:  -  Using Estrogen cream and cranberry supplements  - Last UTI 3/2022     Past Medical History:   Diagnosis Date    Arthralgia of hip 02/12/2024    Bruises easily 02/12/2024    Chest pain 02/12/2024    Chronic rhinitis     Chronic rhinitis    Contact with and (suspected) exposure to pediculosis, acariasis and other infestations 07/24/2014    Scabies exposure    Cramps of lower extremity 02/12/2024    Disorder of kidney and ureter, unspecified 01/19/2016    Mild renal insufficiency    Encounter for screening mammogram for malignant neoplasm of breast 12/09/2015    Other screening mammogram    Fall due to slipping on ice or snow 02/12/2024    Headache, unspecified 06/28/2017    Worsening headaches    Hemoptysis 10/25/2019    Cough with hemoptysis    History of cardiovascular disorder 02/12/2024    Menopausal and female climacteric states 07/21/2015    Menopausal symptoms    Nausea 02/12/2024    Nondisplaced fracture of neck of right radius, initial encounter for closed fracture 05/08/2019    Other conditions influencing health status     Acute Sphenoidal Sinusitis    Other conditions influencing health status     Pulmonary Disease    Pain in wrist 02/12/2024    Personal history of diseases of the blood and blood-forming organs and  certain disorders involving the immune mechanism 05/27/2022    History of immunodeficiency    Personal history of other diseases of the circulatory system 12/07/2018    History of diastolic dysfunction    Personal history of other diseases of the circulatory system 11/05/2018    History of abnormal electrocardiography    Personal history of other diseases of the circulatory system 12/07/2018    History of cardiomyopathy    Personal history of other diseases of the circulatory system     History of peripheral vascular disease    Personal history of other diseases of the female genital tract 07/21/2015    History of postmenopausal bleeding    Personal history of other diseases of the female genital tract     History of pelvic inflammatory disease    Personal history of other diseases of the respiratory system 08/05/2013    Personal history of asthma    Personal history of other diseases of the respiratory system 01/27/2016    History of upper respiratory infection    Personal history of other diseases of the respiratory system 09/12/2013    History of upper respiratory infection    Personal history of other diseases of the respiratory system 12/16/2013    History of upper respiratory infection    Personal history of other diseases of the respiratory system 08/05/2013    History of allergic rhinitis    Personal history of other diseases of urinary system 03/14/2022    History of pyelonephritis    Personal history of other specified conditions 03/10/2016    History of abnormal mammogram    Personal history of other specified conditions 01/06/2020    History of chest pain    Personal history of other specified conditions 11/21/2018    History of hemoptysis    Pneumonia, unspecified organism 11/06/2015    CAP (community acquired pneumonia)    Pseudomonas aeruginosa infection 02/12/2024    Rash 02/12/2024    Rash and other nonspecific skin eruption 12/08/2014    Rash of neck    Trochanteric bursitis, unspecified hip  04/18/2014    Trochanteric bursitis    Urinary tract infection, site not specified 02/10/2022    Acute UTI          Past Surgical History:       Past Surgical History:   Procedure Laterality Date    DILATION AND CURETTAGE OF UTERUS  10/08/2012    Dilation And Curettage    HERNIA REPAIR  10/08/2012    Hernia Repair         Medications:       Prior to Admission medications    Medication Sig Start Date End Date Taking? Authorizing Provider   aflibercept (Eylea) 2 mg/0.05 mL intra-ocular injection 0.05 mL (2 mg) by intravitreal route 1 time.    Historical Provider, MD   albuterol 2.5 mg /3 mL (0.083 %) nebulizer solution Take 3 mL (2.5 mg) by nebulization 2 times a day. 2/16/24   Crissy Gil MD   albuterol 90 mcg/actuation inhaler Inhale 2 puffs 2 times a day.    Historical Provider, MD   amoxicillin-pot clavulanate (Augmentin) 875-125 mg tablet Take one tablet with food every  12 hours 2/12/24   Eva De Paz MD   azelastine (Astelin) 137 mcg (0.1 %) nasal spray  4/13/23   Historical Provider, MD   azelastine 205.5 mcg (0.15 %) spray,non-aerosol Administer into affected nostril(s) twice a day.    Historical Provider, MD   budesonide (Pulmicort) 0.5 mg/2 mL nebulizer solution Inhale 2 mL (0.5 mg) 2 times a day. 8/25/15   Historical Provider, MD   CALCIUM CARBONATE-VITAMIN D3 ORAL Take 1 tablet by mouth once daily.    Historical Provider, MD   coenzyme Q-10 100 mg capsule Take 1 capsule (100 mg) by mouth.    Historical Provider, MD   cranberry 500 mg capsule     Historical Provider, MD   escitalopram (Lexapro) 5 mg tablet Take one tablet qpm 10/16/23   Eva De Paz MD   estradiol (Estrace) 0.01 % (0.1 mg/gram) vaginal cream  3/16/22   Historical Provider, MD   famotidine (Pepcid) 20 mg tablet Take one tablet in empty stomach in amand one table in empty stomach in the afternoon 10/16/23   Eva De Paz MD   ferrous sulfate  mg ER tablet Take 1 tablet by mouth once daily. 4/14/15   Historical  Provider, MD   folic acid (Folvite) 1 mg tablet Take 1 tablet (1 mg) by mouth once daily.    Historical Provider, MD   ibuprofen (Motrin) capsule Take 3 capsules (600 mg) by mouth once daily as needed.    Historical Provider, MD   immune globulin, human, (Privigen) 10 % solution infusion orally every 3 weeks    Historical Provider, MD   ipratropium (Atrovent) 42 mcg (0.06 %) nasal spray Administer into affected nostril(s). 9/30/15   Historical Provider, MD   levoFLOXacin (Levaquin) 750 mg tablet Take 1 tablet (750 mg) by mouth once daily. 2/19/24   Historical Provider, MD   levothyroxine (Tirosint) 75 mcg capsule Take 1 capsule (75 mcg) by mouth once daily in the morning. Take before meals.    Historical Provider, MD   magnesium oxide (Mag-Ox) 250 mg magnesium tablet     Historical Provider, MD   multivitamin (Multiple Vitamins) tablet Take 1 tablet by mouth once daily. 4/8/20   Historical Provider, MD   potassium chloride ER (Micro-K) 10 mEq ER capsule Take one capsule daily with food 10/16/23   Eva De Paz MD   psyllium (Metamucil) 0.4 gram capsule Take by mouth. 3/17/23   Historical Provider, MD   rOPINIRole (Requip) 4 mg tablet Take 1 tablet (4 mg) by mouth. 4/18/14   Historical Provider, MD   rosuvastatin 10 mg capsule, sprinkle Take 10 mg by mouth once daily.    Historical Provider, MD   SUMAtriptan (Imitrex) 50 mg tablet Take 1 tablet onset of headache May repeat dose once in 2 hours if no relief.  Do not exceed 2 doses in 24 hours. 2/16/24   Eva De Paz MD   tretinoin (Retin-A) 0.025 % cream Apply topically. 5/11/23   Historical Provider, MD LEE  Review of Systems     Blood, Urine   Date Value Ref Range Status   03/04/2022 MODERATE(2+) (A) NEGATIVE Final     Nitrite, Urine   Date Value Ref Range Status   03/04/2022 POSITIVE (A) NEGATIVE Final     Urobilinogen, Urine   Date Value Ref Range Status   03/04/2022 <2.0 0.0 - 1.9 mg/dL Final         PHYSICAL EXAM:      /68   Pulse 83  "  Ht 1.702 m (5' 7\")   Wt 56.7 kg (125 lb)   LMP  (LMP Unknown)   BMI 19.58 kg/m²      No LMP recorded (lmp unknown). Patient is postmenopausal.      Declines chaperone for physical exam.      Well developed, well nourished, in no apparent distress.   Neurologic/Psychiatric:  Awake, Alert and Oriented times 3.  Affect normal.           Data and DIAGNOSTIC STUDIES REVIEWED   MRCP pancreas w and wo IV contrast    Result Date: 3/6/2024  Interpreted By:  Ke Bolton, STUDY: MRCP PANCREAS W AND WO IV CONTRAST;  3/6/2024 9:37 am   INDICATION: Signs/Symptoms:Surveillance of pancreatic cyst,MF BD IPMN, compare to prior images.   COMPARISON: MRI/MRCP 8 March 2023; other, older comparison exams (MRI, CT) back to the oldest available pertinent comparison exam, CT chest without contrast 22 October 2010   ACCESSION NUMBER(S): [id number]   ORDERING CLINICIAN: SHILPA JAMES   TECHNIQUE: Multi-planar, multi-pulse sequence MRI abdomen before and after the uneventful administration of 10 mL gadolinium-based intravenous contrast material (Dotarem), including MRCP with coronal thick slab volume-rendered reformatted images.   Three dimensional maximum intensity projection (3-D MIPs) image/s were created on a separate dedicated workstation, reviewed and saved   FINDINGS: PANCREAS: Acute inflammatory change: Negative Morphologic changes of chronic pancreatitis: Negative Gland necrosis: Negative Peripancreatic collection: Negative Peripancreatic fat necrosis: Negative Main duct dilation: Negative Pancreas divisum: Negative Solid mass: Negative Cyst / cystic lesion: 14 mm craniocaudal, 22 mm anteroposterior, 17 mm transverse unilocular cyst in the distal body or tail of the gland. When measured at similar locations in a similar fashion on 8 March 2023, it measured 14 mm craniocaudal, 18 mm anteroposterior, 14 mm transverse. Elsewhere in the gland, a few subcentimeter simple unilocular cysts that seem unchanged   LIVER: Size:  " Within normal limits Cirrhotic change:  Negative Fatty change:  Negative Significant iron deposition:  Negative Solid mass:  Negative Other:  n/a   GALLBLADDER: Stone/s: Negative Polyp: Negative Dilation: Negative Wall thickening: Negative Other: n/a   BILE DUCTS: Intrahepatic dilation: Negative Extrahepatic dilation: Probably age related physiologic ectasia, unchanged going back through all prior MRIs including the oldest 2 March 2022 Stricture: Negative Stone/s: Negative Other: n/a   SPLEEN:  The spleen is normal in size, without focal lesion.   RIGHT ADRENAL GLAND:  No mass or hyperplasia.   LEFT ADRENAL GLAND:  No mass or hyperplasia.   RIGHT KIDNEY AND INCLUDED URETER:  No hydronephrosis, solid mass or any other acute findings.   LEFT KIDNEY AND INCLUDED URETER:  No hydronephrosis, solid mass or any other acute findings.   LYMPH NODES:  No abdominal adenopathy, intraperitoneal, retroperitoneal or otherwise.   INCLUDED BOWEL:  Normal in caliber and wall thickness.   RETROPERITONEUM:  Normal.  No hemorrhage or inflammatory change. (lymph nodes in dedicated section)   OMENTUM, MESENTERY AND PERITONEAL SPACES:  No fluid collection or free fluid.  Grossly normal omentum and mesentery.  (lymph nodes in dedicated section)   VASCULATURE:  No abdominal aortic aneurysm.  No large vessel occlusion or critical stenosis.  The portal venous system is patent.   LOWER CHEST:  No pleural effusion.   BONES:  Normal marrow signal intensity in the included skeleton.       Dominant unilocular pancreatic cyst presently measures 14 x 22 x 17 mm   When measured at similar locations in a similar fashion on the most recent comparison exam, MRI/MRCP 8 March 2023, it measured no greater than 14 x 18 x 14 mm   Note any discrepancy from previously reported size is due to  dependence. I have measured the dominant unilocular pancreatic cyst myself in all three planes on both exams, comparing like to like   Just a few other  subcentimeter simple pancreatic cysts are unchanged   No solid/enhancing pancreatic (or any other abdominal) mass suspect for malignancy   No acute unexpected findings such as hydronephrosis or acute pancreatitis   MACRO: None   Signed by: Ke Bolton 3/6/2024 2:45 PM Dictation workstation:   [id number]    CT head w and wo IV contrast    Result Date: 3/1/2024  Interpreted By:  Jay Tejada, STUDY: RADHA ALTMAN; 3/1/2024 2:36 pm CT head without and with contrast.   INDICATION: Signs/Symptoms:persistent headache, inmunnocompromise;   COMPARISON: None   ACCESSION NUMBER(S): [id number]   ORDERING CLINICIAN: RADHA ALTMAN   TECHNIQUE: Contiguous axial images were acquired from the vertex through the posterior fossa without and then with IV contrast. Postcontrast images were obtained following the administration of 50 cc IV Omnipaque 350. All CT examinations are performed with 1 or more of the following dose reduction techniques: Automated exposure control, adjustment of mA and/or kv according to patient's size, or use of iterative reconstruction techniques.   FINDINGS: No focal mass effect or midline shift is identified. The ventricles and sulci are symmetric and appropriate for the patient's age.   The gray white matter differentiation is preserved.   No acute intracranial hemorrhage is seen. No intra-axial or extra-axial fluid collection is seen.   Postcontrast images show no evidence of abnormal enhancement nor enhancing lesions.   The visualized paranasal sinuses and mastoid air cells are clear.       No CT evidence for acute intracranial pathology. No abnormal enhancement nor enhancing lesions.   Signed by: Jay Tejada 3/1/2024 3:07 PM Dictation workstation:   [id number]     Lab Results   Component Value Date    URINECULTURE NO SIGNIFICANT GROWTH. 07/20/2023      Lab Results   Component Value Date    GLUCOSE 94 02/21/2024    CALCIUM 9.1 02/21/2024     02/21/2024    K 4.1 02/21/2024     CO2 28 02/21/2024     02/21/2024    BUN 14 02/21/2024    CREATININE 0.87 02/21/2024     Lab Results   Component Value Date    WBC 6.9 03/01/2024    HGB 12.1 03/01/2024    HCT 37.8 03/01/2024    MCV 96 03/01/2024     03/01/2024      ASSESSMENT &PLAN     Assessment:   73 y.o. old female being assessed for Josue's    Diagnoses:   #1 Josue's    Plan:   Josue's  - Continue use of TV estrogen cream twice a week, refilled and sent to pharmacy   - Continue use of OTC cranberry supplements  - Standing urine culture order placed, pt advised      Follow-up in one year with Dr. Dina Fulleribe Attestation  By signing my name below, I, Sheba Khanna , Scribe   attest that this documentation has been prepared under the direction and in the presence of Chelsey Arroyo MD.      Agree with above. I Dr. Arroyo, personally performed the services described in the documentation which was scribed virtually and confirm it is both complete and accurate.  Chelsey Arroyo MD

## 2024-03-12 ENCOUNTER — APPOINTMENT (OUTPATIENT)
Dept: PRIMARY CARE | Facility: CLINIC | Age: 74
End: 2024-03-12
Payer: MEDICARE

## 2024-03-12 DIAGNOSIS — E03.9 HYPOTHYROIDISM, UNSPECIFIED TYPE: ICD-10-CM

## 2024-03-12 RX ORDER — LEVOTHYROXINE SODIUM 75 UG/1
TABLET ORAL
Qty: 98 TABLET | Refills: 1 | Status: SHIPPED | OUTPATIENT
Start: 2024-03-12

## 2024-03-12 RX ORDER — ESCITALOPRAM OXALATE 10 MG/1
TABLET ORAL
Qty: 90 TABLET | Refills: 2 | Status: SHIPPED | OUTPATIENT
Start: 2024-03-12 | End: 2024-03-14

## 2024-03-12 RX ORDER — POTASSIUM CHLORIDE 750 MG/1
CAPSULE, EXTENDED RELEASE ORAL
Qty: 90 CAPSULE | Refills: 1 | Status: SHIPPED | OUTPATIENT
Start: 2024-03-12 | End: 2024-03-14 | Stop reason: SDUPTHER

## 2024-03-14 ENCOUNTER — LAB (OUTPATIENT)
Dept: LAB | Facility: LAB | Age: 74
End: 2024-03-14
Payer: MEDICARE

## 2024-03-14 ENCOUNTER — APPOINTMENT (OUTPATIENT)
Dept: PRIMARY CARE | Facility: CLINIC | Age: 74
End: 2024-03-14
Payer: MEDICARE

## 2024-03-14 ENCOUNTER — OFFICE VISIT (OUTPATIENT)
Dept: PRIMARY CARE | Facility: CLINIC | Age: 74
End: 2024-03-14
Payer: MEDICARE

## 2024-03-14 VITALS
BODY MASS INDEX: 19.62 KG/M2 | TEMPERATURE: 97.1 F | RESPIRATION RATE: 16 BRPM | OXYGEN SATURATION: 97 % | WEIGHT: 125 LBS | HEIGHT: 67 IN | HEART RATE: 70 BPM | SYSTOLIC BLOOD PRESSURE: 90 MMHG | DIASTOLIC BLOOD PRESSURE: 41 MMHG

## 2024-03-14 DIAGNOSIS — K21.00 GASTROESOPHAGEAL REFLUX DISEASE WITH ESOPHAGITIS WITHOUT HEMORRHAGE: ICD-10-CM

## 2024-03-14 DIAGNOSIS — E03.9 PRIMARY HYPOTHYROIDISM: ICD-10-CM

## 2024-03-14 DIAGNOSIS — F33.9 DEPRESSION, RECURRENT (CMS-HCC): ICD-10-CM

## 2024-03-14 DIAGNOSIS — I95.9 HYPOTENSION DETERMINED BY EXAMINATION: ICD-10-CM

## 2024-03-14 DIAGNOSIS — M25.512 CHRONIC LEFT SHOULDER PAIN: ICD-10-CM

## 2024-03-14 DIAGNOSIS — E78.5 DYSLIPIDEMIA: ICD-10-CM

## 2024-03-14 DIAGNOSIS — G25.81 RESTLESS LEGS SYNDROME: ICD-10-CM

## 2024-03-14 DIAGNOSIS — G43.909 MIGRAINE SYNDROME: Primary | ICD-10-CM

## 2024-03-14 DIAGNOSIS — E53.0 VITAMIN B2 DEFICIENCY: ICD-10-CM

## 2024-03-14 DIAGNOSIS — E87.6 HYPOKALEMIA: ICD-10-CM

## 2024-03-14 DIAGNOSIS — G43.909 MIGRAINE SYNDROME: ICD-10-CM

## 2024-03-14 DIAGNOSIS — G89.29 CHRONIC LEFT SHOULDER PAIN: ICD-10-CM

## 2024-03-14 LAB
MAGNESIUM SERPL-MCNC: 2.05 MG/DL (ref 1.6–2.4)
TSH SERPL-ACNC: 0.91 MIU/L (ref 0.44–3.98)

## 2024-03-14 PROCEDURE — 83735 ASSAY OF MAGNESIUM: CPT

## 2024-03-14 PROCEDURE — 1159F MED LIST DOCD IN RCRD: CPT | Performed by: INTERNAL MEDICINE

## 2024-03-14 PROCEDURE — 99214 OFFICE O/P EST MOD 30 MIN: CPT | Performed by: INTERNAL MEDICINE

## 2024-03-14 PROCEDURE — 1036F TOBACCO NON-USER: CPT | Performed by: INTERNAL MEDICINE

## 2024-03-14 PROCEDURE — 1157F ADVNC CARE PLAN IN RCRD: CPT | Performed by: INTERNAL MEDICINE

## 2024-03-14 PROCEDURE — 84443 ASSAY THYROID STIM HORMONE: CPT

## 2024-03-14 PROCEDURE — 36415 COLL VENOUS BLD VENIPUNCTURE: CPT

## 2024-03-14 PROCEDURE — 84252 ASSAY OF VITAMIN B-2: CPT

## 2024-03-14 RX ORDER — FAMOTIDINE 20 MG/1
TABLET, FILM COATED ORAL
Qty: 180 TABLET | Refills: 1 | Status: SHIPPED | OUTPATIENT
Start: 2024-03-14

## 2024-03-14 RX ORDER — POTASSIUM CHLORIDE 750 MG/1
CAPSULE, EXTENDED RELEASE ORAL
Qty: 90 CAPSULE | Refills: 1 | Status: SHIPPED | OUTPATIENT
Start: 2024-03-14

## 2024-03-14 RX ORDER — ROPINIROLE 4 MG/1
4 TABLET, FILM COATED ORAL NIGHTLY
Qty: 90 TABLET | Refills: 0 | Status: SHIPPED | OUTPATIENT
Start: 2024-03-14

## 2024-03-14 RX ORDER — ESCITALOPRAM OXALATE 5 MG/1
TABLET ORAL
Qty: 90 TABLET | Refills: 1 | Status: SHIPPED | OUTPATIENT
Start: 2024-03-14

## 2024-03-14 ASSESSMENT — ENCOUNTER SYMPTOMS
DEPRESSION: 0
LOSS OF SENSATION IN FEET: 0
OCCASIONAL FEELINGS OF UNSTEADINESS: 0

## 2024-03-14 ASSESSMENT — PATIENT HEALTH QUESTIONNAIRE - PHQ9
SUM OF ALL RESPONSES TO PHQ9 QUESTIONS 1 AND 2: 0
2. FEELING DOWN, DEPRESSED OR HOPELESS: NOT AT ALL
1. LITTLE INTEREST OR PLEASURE IN DOING THINGS: NOT AT ALL

## 2024-03-14 ASSESSMENT — COLUMBIA-SUICIDE SEVERITY RATING SCALE - C-SSRS
6. HAVE YOU EVER DONE ANYTHING, STARTED TO DO ANYTHING, OR PREPARED TO DO ANYTHING TO END YOUR LIFE?: NO
1. IN THE PAST MONTH, HAVE YOU WISHED YOU WERE DEAD OR WISHED YOU COULD GO TO SLEEP AND NOT WAKE UP?: NO
2. HAVE YOU ACTUALLY HAD ANY THOUGHTS OF KILLING YOURSELF?: NO

## 2024-03-14 NOTE — PROGRESS NOTES
"55.7Subjective   Patient ID: ELIANE Yan is a 73 y.o. female who presents for AD (Adjustment Disorder), blood work, and Headache.    HPI   She wants to discuss her change in her migranes that last episode in February were very long, she discuss with us further intervention  . Pt used  but discontinue topiramate up to 50 mg in 2021.  She uses sumatriptan but unable to take it early to make it very effective, because sometimes her headaches are mild, related to weather changes and allergies and difficult to identify real migrane.  Hydration is around 64 oz  Left shoulder stiffness to reach back, and pain when sleeps in left side, waking her up    Review of Systems   All other systems reviewed and are negative.  Heartburn control.  Mood is doing well, relationship with  has greatly improved.  Resless well control on meds    Objective   BP (!) 90/41 (BP Location: Right arm, Patient Position: Sitting, BP Cuff Size: Adult)   Pulse 70   Temp 36.2 °C (97.1 °F)   Resp 16   Ht 1.702 m (5' 7\")   Wt 56.7 kg (125 lb)   LMP  (LMP Unknown)   SpO2 97%   BMI 19.58 kg/m²     Physical Exam  Eyes - conjunctivae clear, PERRLA  HEENT - no impacted wax, hyperflexible tmjoints ,no tender to exam  Neck - no cervical lymphadenopathy, no thyromegaly  Axilla - no palpable lymphadenopathy  Cardiac- regular rate and rhythm, no murmurs, no carotid bruit, no JVP  Lung - clear to auscultation, no rales, no rhonchi, no wheezing  GI - normally active bowel sounds, non tender, non distended, no hepatosplenomegaly, no rebound  MSK - non deformities, hyperflexible joints , however stiffness  on left shoulder.  Extremities - no edema, good distal pulses  Neuro - non focal, oriented x 3  Skin - no bruises, no rashes  Psychiatric - pleasant, well groom, no hallucinations    Assessment/Plan   Problem List Items Addressed This Visit             ICD-10-CM    Chronic left shoulder pain M25.512, G89.29    Relevant Orders    XR shoulder " left 2+ views    Referral to Physical Therapy    Depression, recurrent (CMS/HCC) F33.9     Continue current dose.         Relevant Medications    escitalopram (Lexapro) 5 mg tablet    Dyslipidemia E78.5     On goal         Relevant Medications    rosuvastatin 10 mg capsule, sprinkle    GERD (gastroesophageal reflux disease) K21.9     Well control         Relevant Medications    famotidine (Pepcid) 20 mg tablet    Hypokalemia E87.6     Continue same dose         Relevant Medications    potassium chloride ER (Micro-K) 10 mEq ER capsule    Primary hypothyroidism E03.9    Relevant Orders    TSH with reflex to Free T4 if abnormal (Completed)    Migraine syndrome - Primary G43.909     Advised to suplement with magnesium, higiene for prevention, re eval.         Relevant Orders    Magnesium (Completed)    Restless legs syndrome G25.81     Continue same  meds         Relevant Medications    rOPINIRole (Requip) 4 mg tablet    Hypotension determined by examination I95.9     Discussed sodium in diet, fluids, re eval in office again.          Other Visit Diagnoses         Codes    Vitamin B2 deficiency     E53.0    Relevant Orders    Vitamin B2, Plasma

## 2024-03-14 NOTE — PATIENT INSTRUCTIONS
Please avoid dehydration, be consistent with your bed time very day of the week, check and avoid any food that may trigger headaches, avoid prolonged fasting , tyr to find out foods that triggered,   start b2 and magnesium base in blood work. Complete xrays shoulder and start physical therapy.  Due to your blood pressure increase salt , monitor bp in couple days. Monitor your headaches. Return in 1m

## 2024-03-16 ENCOUNTER — HOSPITAL ENCOUNTER (OUTPATIENT)
Dept: RADIOLOGY | Facility: CLINIC | Age: 74
Discharge: HOME | End: 2024-03-16
Payer: MEDICARE

## 2024-03-16 DIAGNOSIS — M25.512 CHRONIC LEFT SHOULDER PAIN: ICD-10-CM

## 2024-03-16 DIAGNOSIS — G89.29 CHRONIC LEFT SHOULDER PAIN: ICD-10-CM

## 2024-03-16 PROCEDURE — 73030 X-RAY EXAM OF SHOULDER: CPT | Mod: LT

## 2024-03-16 PROCEDURE — 73030 X-RAY EXAM OF SHOULDER: CPT | Mod: LEFT SIDE | Performed by: RADIOLOGY

## 2024-03-17 LAB — VIT B2 SERPL-SCNC: 20 NMOL/L (ref 5–50)

## 2024-03-18 DIAGNOSIS — J47.9 BRONCHIECTASIS WITHOUT COMPLICATION (MULTI): ICD-10-CM

## 2024-03-18 DIAGNOSIS — G43.909 MIGRAINE SYNDROME: ICD-10-CM

## 2024-03-18 RX ORDER — SUMATRIPTAN 50 MG/1
TABLET, FILM COATED ORAL
Qty: 27 TABLET | Refills: 1 | Status: SHIPPED | OUTPATIENT
Start: 2024-03-18

## 2024-03-21 ENCOUNTER — EVALUATION (OUTPATIENT)
Dept: PHYSICAL THERAPY | Facility: CLINIC | Age: 74
End: 2024-03-21
Payer: MEDICARE

## 2024-03-21 ENCOUNTER — LAB REQUISITION (OUTPATIENT)
Dept: LAB | Facility: HOSPITAL | Age: 74
End: 2024-03-21
Payer: MEDICARE

## 2024-03-21 DIAGNOSIS — M25.512 CHRONIC LEFT SHOULDER PAIN: Primary | ICD-10-CM

## 2024-03-21 DIAGNOSIS — M62.81 MUSCLE WEAKNESS ON EXAMINATION: ICD-10-CM

## 2024-03-21 DIAGNOSIS — G89.29 CHRONIC LEFT SHOULDER PAIN: Primary | ICD-10-CM

## 2024-03-21 DIAGNOSIS — J47.1 BRONCHIECTASIS WITH (ACUTE) EXACERBATION (MULTI): ICD-10-CM

## 2024-03-21 PROCEDURE — 97161 PT EVAL LOW COMPLEX 20 MIN: CPT | Mod: GP

## 2024-03-21 PROCEDURE — 97110 THERAPEUTIC EXERCISES: CPT | Mod: GP

## 2024-03-21 NOTE — LETTER
March 21, 2024    Amanda Muse, PT  [address]  [address]  [facility] [address]    Patient: ABAD Yan   YOB: 1950   Date of Visit: 3/21/2024       Dear Eva De Paz MD  [facility], [address]  [address] [place] [address]    The attached plan of care is being sent to you because your patient’s medical reimbursement requires that you certify the plan of care. Your signature is required to allow uninterrupted insurance coverage.      You may indicate your approval by signing below and faxing this form back to us at Dept Fax: [phone].    Please call Dept: [phone] with any questions or concerns.    Thank you for this referral,        Amanda Muse, PT  [facility] [address] [facility] [address]    Payer: Payor: MEDICARE / Plan: MEDICARE PART A AND B / Product Type: *No Product type* /                                                                         Date:     Dear Amanda Muse, PT,     Re: Ms. Abad Yan, MRN:[id number]    I certify that I have reviewed the attached plan of care and it is medically necessary for Ms. Abad Yan (1950) who is under my care.          ______________________________________                    _________________  Provider name and credentials                                           Date and time                                                                                           Plan of Care 3/21/24   Effective from: 3/21/2024  Effective to: 5/20/2024    Plan ID: [id number]            Participants as of Finalize on 3/21/2024    Name Type Comments Contact Info    Eva De Paz MD PCP - General  [phone]    Amanda Muse, PT Physical Therapist  [phone]       Last Plan Note     Author: Amanda Muse PT Status: Incomplete Last edited: 3/21/2024 10:15 AM         Physical  Therapy  Physical Therapy Orthopedic Evaluation    Patient Name: Abad Yan  MRN: [id number]  Today's Date: 3/21/2024  Time Calculation  Start Time: 1020  Stop Time: 1105  Time Calculation (min): 45 min  PT Evaluation Time Entry  PT Evaluation (Low) Time Entry: 25 PT Therapeutic Procedures Time Entry  Therapeutic Exercise Time Entry: 10          Insurance:  Visit number: 1 of [place]  Authorization info: none  Insurance Type: Payor: MEDICARE / Plan: MEDICARE PART A AND B / Product Type: *No Product type* /     Certification dates: 3/21/24 to 5/21/24  CPT Codes: 31436 TE, 83737 MT, 96630 NM-ANTONY, 66718 GAIT, 03826 STIM, 21565 SELF CARE    Current Problem  1. Chronic left shoulder pain  Referral to Physical Therapy    Follow Up In Physical Therapy      2. Muscle weakness on examination  Follow Up In Physical Therapy          Referred by: Dr. Eva De Paz    General:     Left shoulder pain   Precautions:     Common variable immune deficiency    Medical History Form: Reviewed (scanned into chart)  Hx of depression, GERD, dyslipidemia, hypothyroidism, migraine syndrome, restless legs syndrome, scoliosis, lung disease, OA, hx of headaches and migraines, vascular disease,   Subjective:     Pt is a 74 yo female who reports left shoulder/arm pain that started at night when she was sleeping on it. She reports that she is right hand dominant.  She started noticing the pain ~ 1 months ago. She reports that she notices that she is more forward in her posture.     She does have a history of migraines that have increased and has been treated for cervicogenic headaches in the past    Chief Complaint: left shoulder pain   Onset: ~ 1 month ago  REGGIE: insidious         Pain:     Location: left shoulder, down the arm  Description: shoulder to the elbow achy   When it happens:  wakes her up 5/10, doesn't know it during the day  Aggravating Factors:  sleeping on the left shoulder  She will sleep on the right and left shoulder  back and forth  Reports difficulty sleeping on her back bc of her scoliosis   Relieving Factors:  rest    Relevant Information (PMH & Previous Tests/Imaging):   FINDINGS: (3/16)  There is no radiographic evidence of acute fracture or dislocation  identified.  There is narrowing of the left acromial humeral space.  Mild joint space narrowing and minimal marginal osteophyte formation  is seen in the left glenohumeral joint.      IMPRESSION:  1. No evidence of acute fracture or dislocation.  2. Degenerative changes, as described above.  Previous Interventions/Treatments: None    Prior Level of Function (PLOF)  Patient previously independent with all ADLs  Exercise/Physical Activity: yoga, enjoy doing it, but haven't done  Work/School: retired   [employer]     Patients Living Environment: Reviewed and no concern  Primary Language: English  Patient's Goal(s) for Therapy: decrease pain, improve sleep    Red Flags: Do you have any of the following? No  Fever/chills, unexplained weight changes, dizziness/fainting, unexplained change in bowel or bladder functions, unexplained malaise or muscle weakness, night pain/sweats, numbness or tingling    Objective:  Objective    Cervical screen:  no pain with AROM  Flexion: 40 degrees   Ext:  35 degrees  Right SB:  20 degrees  Left SB 20 degrees   Right rot:  35 degrees   Left rot:  35 degrees     Posture: slight rounding of the shoulders, reports a larger anterior lean when she uses her computer    Right scapular blade resting position higher than the left ( protracted out)    General decrease in scapular stabilizer strength ( left > right)  3/5 for the right, 3-/5 for the left rhomboids    UE AROM right and left   Flexion full, no pain into resistance  Ext:  WFL bilateral  Abduction:  clear, WFL bilateral no pain into resistance  Across the body:  left clear, no pain   Top of the head clear bilateral  Behind the back:  right:  T6, left:  T 3   Supine passive  ER:  WFL:  60 degrees of the left     UE MMT  Flexion:  4+/5 B  Ext:  4/5 B  ER:  right 4/5 left 4-/5  IR:  right 4/5 left 4-5/  Bicep:  4+/5 B  Tricep:  4+/5 B  :  good right and left    Dermatomes:  clear for th UE  General hypomobility of the thoracic spine     Shreyas's Test:  negative on the left  Speed's test:  negative   Neer:  negative     Quadrant for the cpine     stiff but no pain   Outcome Measures:  Other Measures  Disability of Arm Shoulder Hand (DASH): 9.09%     Treatments:    Access Code: [email or web address]    Prepared by: Amanda Muse    Exercises  - Bilateral Shoulder Flexion Wall Slide with Towel  - 4-7 x weekly - 1-2 sets - 12-15 reps  - Supine Shoulder Flexion AAROM with Hands Clasped  - 4 x weekly - 1-3 sets - 12-15 reps  - Seated Scapular Retraction  - 4-7 x weekly - 1-3 sets - 12-15 reps - 6 hold  - Corner Pec Minor Stretch  - 4-7 x weekly - 1-3 sets - 12-15 reps - 20-30 hold    Education: positions to avoid with the the neck, importance of proper posture, important of proper ergonomics, sleeping position ( pillow height, mattress information), education on how to decrease UTM activation to decrease stress in the c spine and UE    EDUCATION: Home exercise program, plan of care, activity modifications, pain management, and injury pathology       Assessment: Patient presents with signs and symptoms consistent with left shoulder pain secondary to impingement/degenerative changes, resulting in limited participation in pain-free ADLs and inability to perform at their prior level of function. Pt would benefit from physical therapy to address the impairments found & listed previously in the objective section in order to return to safe and pain-free ADLs and prior level of function.       Plan:     Planned Interventions include: therapeutic exercise, self-care home management, manual therapy, therapeutic activities, gait training, neuromuscular coordination, vasopneumatic, dry needling, aquatic  therapy  Frequency: 1 x Week ( going to start with 3-4 sessions to see how she responds)  Duration: 8 Weeks this way we can space out the sessions as needed   Goals: Set and discussed today     Patient  will report an improvement in sleeping at night by > 50%.  Patient  will report a decrease in left shoulder pain by > 2 on the NRPS .   Pt will improve scapular stabilizer strength by > 1/2 MMT to improve posture and posterior chain strength.  Patient will require no verbal cues to assist with proper posture during her sessions.   Pt will decrease DASH by > 2%.   Patient  will be independent with her HEP.     Plan of care was developed with input and agreement by the patient      Amanda Muse PT         Current Participants as of 3/21/2024    Name Type Comments Contact Info    Eva De Paz MD PCP - General  [phone]    Signature pending    Amanda Muse PT Physical Therapist  [phone]    Signature pending

## 2024-03-21 NOTE — PROGRESS NOTES
Physical Therapy  Physical Therapy Orthopedic Evaluation    Patient Name: Abad Yan  MRN: [id number]  Today's Date: 3/21/2024  Time Calculation  Start Time: 1020  Stop Time: 1105  Time Calculation (min): 45 min  PT Evaluation Time Entry  PT Evaluation (Low) Time Entry: 25 PT Therapeutic Procedures Time Entry  Therapeutic Exercise Time Entry: 10          Insurance:  Visit number: 1 of MN  Authorization info: none  Insurance Type: Payor: MEDICARE / Plan: MEDICARE PART A AND B / Product Type: *No Product type* /     Certification dates: 3/21/24 to 5/21/24  CPT Codes: 29290 TE, 74431 MT, 06791 NM-ANTONY, 08171 GAIT, 58762 STIM, 29323 SELF CARE    Current Problem  1. Chronic left shoulder pain  Referral to Physical Therapy    Follow Up In Physical Therapy      2. Muscle weakness on examination  Follow Up In Physical Therapy          Referred by: Dr. Eva De Paz    General:     Left shoulder pain   Precautions:     Common variable immune deficiency    Medical History Form: Reviewed (scanned into chart)  Hx of depression, GERD, dyslipidemia, hypothyroidism, migraine syndrome, restless legs syndrome, scoliosis, lung disease, OA, hx of headaches and migraines, vascular disease,   Subjective:     Pt is a 74 yo female who reports left shoulder/arm pain that started at night when she was sleeping on it. She reports that she is right hand dominant.  She started noticing the pain ~ 1 months ago. She reports that she notices that she is more forward in her posture.     She does have a history of migraines that have increased and has been treated for cervicogenic headaches in the past    Chief Complaint: left shoulder pain   Onset: ~ 1 month ago  REGGIE: insidious         Pain:     Location: left shoulder, down the arm  Description: shoulder to the elbow achy   When it happens:  wakes her up 5/10, doesn't know it during the day  Aggravating Factors:  sleeping on the left shoulder  She will sleep on the right and left  shoulder back and forth  Reports difficulty sleeping on her back bc of her scoliosis   Relieving Factors:  rest    Relevant Information (PMH & Previous Tests/Imaging):   FINDINGS: (3/16)  There is no radiographic evidence of acute fracture or dislocation  identified.  There is narrowing of the left acromial humeral space.  Mild joint space narrowing and minimal marginal osteophyte formation  is seen in the left glenohumeral joint.      IMPRESSION:  1. No evidence of acute fracture or dislocation.  2. Degenerative changes, as described above.  Previous Interventions/Treatments: None    Prior Level of Function (PLOF)  Patient previously independent with all ADLs  Exercise/Physical Activity: yoga, enjoy doing it, but haven't done  Work/School: retired   [employer] [place]     Patients Living Environment: Reviewed and no concern  Primary Language: English  Patient's Goal(s) for Therapy: decrease pain, improve sleep    Red Flags: Do you have any of the following? No  Fever/chills, unexplained weight changes, dizziness/fainting, unexplained change in bowel or bladder functions, unexplained malaise or muscle weakness, night pain/sweats, numbness or tingling    Objective:  Objective     Cervical screen:  no pain with AROM  Flexion: 40 degrees   Ext:  35 degrees  Right SB:  20 degrees  Left SB 20 degrees   Right rot:  35 degrees   Left rot:  35 degrees     Posture: slight rounding of the shoulders, reports a larger anterior lean when she uses her computer    Right scapular blade resting position higher than the left ( protracted out)    General decrease in scapular stabilizer strength ( left > right)  3/5 for the right, 3-/5 for the left rhomboids    UE AROM right and left   Flexion full, no pain into resistance  Ext:  WFL bilateral  Abduction:  clear, WFL bilateral no pain into resistance  Across the body:  left clear, no pain   Top of the head clear bilateral  Behind the back:  right:  T6, left:  T 3   Supine  passive ER:  WFL:  60 degrees of the left     UE MMT  Flexion:  4+/5 B  Ext:  4/5 B  ER:  right 4/5 left 4-/5  IR:  right 4/5 left 4-5/  Bicep:  4+/5 B  Tricep:  4+/5 B  :  good right and left    Dermatomes:  clear for th UE  General hypomobility of the thoracic spine     Shreyas's Test:  negative on the left  Speed's test:  negative   Neer:  negative     Quadrant for the cpine     stiff but no pain   Outcome Measures:  Other Measures  Disability of Arm Shoulder Hand (DASH): 9.09%     Treatments:    Access Code: [email or web address]    Prepared by: Amanda Muse    Exercises  - Bilateral Shoulder Flexion Wall Slide with Towel  - 4-7 x weekly - 1-2 sets - 12-15 reps  - Supine Shoulder Flexion AAROM with Hands Clasped  - 4 x weekly - 1-3 sets - 12-15 reps  - Seated Scapular Retraction  - 4-7 x weekly - 1-3 sets - 12-15 reps - 6 hold  - Corner Pec Minor Stretch  - 4-7 x weekly - 1-3 sets - 12-15 reps - 20-30 hold    Education: positions to avoid with the the neck, importance of proper posture, important of proper ergonomics, sleeping position ( pillow height, mattress information), education on how to decrease UTM activation to decrease stress in the c spine and UE    EDUCATION: Home exercise program, plan of care, activity modifications, pain management, and injury pathology       Assessment: Patient presents with signs and symptoms consistent with left shoulder pain secondary to impingement/degenerative changes, resulting in limited participation in pain-free ADLs and inability to perform at their prior level of function. Pt would benefit from physical therapy to address the impairments found & listed previously in the objective section in order to return to safe and pain-free ADLs and prior level of function.       Plan:     Planned Interventions include: therapeutic exercise, self-care home management, manual therapy, therapeutic activities, gait training, neuromuscular coordination, vasopneumatic, dry needling, aquatic  therapy  Frequency: 1 x Week ( going to start with 3-4 sessions to see how she responds)  Duration: 8 Weeks this way we can space out the sessions as needed   Goals: Set and discussed today     Patient  will report an improvement in sleeping at night by > 50%.  Patient  will report a decrease in left shoulder pain by > 2 on the NRPS .   Pt will improve scapular stabilizer strength by > 1/2 MMT to improve posture and posterior chain strength.  Patient will require no verbal cues to assist with proper posture during her sessions.   Pt will decrease DASH by > 2%.   Patient  will be independent with her HEP.     Plan of care was developed with input and agreement by the patient      Amanda Muse, PT

## 2024-03-25 ENCOUNTER — APPOINTMENT (OUTPATIENT)
Dept: OBSTETRICS AND GYNECOLOGY | Facility: CLINIC | Age: 74
End: 2024-03-25
Payer: MEDICARE

## 2024-03-25 ENCOUNTER — TELEPHONE (OUTPATIENT)
Dept: PRIMARY CARE | Facility: CLINIC | Age: 74
End: 2024-03-25
Payer: MEDICARE

## 2024-03-26 ENCOUNTER — TELEPHONE (OUTPATIENT)
Dept: PRIMARY CARE | Facility: CLINIC | Age: 74
End: 2024-03-26
Payer: MEDICARE

## 2024-03-27 LAB
ACID FAST STN SPEC: NORMAL
MYCOBACTERIUM SPEC CULT: NORMAL

## 2024-03-28 ENCOUNTER — HOSPITAL ENCOUNTER (OUTPATIENT)
Dept: RESPIRATORY THERAPY | Facility: HOSPITAL | Age: 74
Discharge: HOME | End: 2024-03-28
Payer: MEDICARE

## 2024-03-28 DIAGNOSIS — J47.9 BRONCHIECTASIS WITHOUT COMPLICATION (MULTI): ICD-10-CM

## 2024-03-28 PROCEDURE — 94010 BREATHING CAPACITY TEST: CPT | Performed by: INTERNAL MEDICINE

## 2024-03-28 PROCEDURE — 94729 DIFFUSING CAPACITY: CPT | Performed by: INTERNAL MEDICINE

## 2024-03-28 PROCEDURE — 94726 PLETHYSMOGRAPHY LUNG VOLUMES: CPT

## 2024-03-28 PROCEDURE — 94618 PULMONARY STRESS TESTING: CPT | Performed by: INTERNAL MEDICINE

## 2024-03-28 PROCEDURE — 94726 PLETHYSMOGRAPHY LUNG VOLUMES: CPT | Performed by: INTERNAL MEDICINE

## 2024-03-30 LAB
MGC ASCENT PFT - FEV1 - PRE: 2.04
MGC ASCENT PFT - FEV1 - PREDICTED: 2.22
MGC ASCENT PFT - FVC - PRE: 3.56
MGC ASCENT PFT - FVC - PREDICTED: 2.92

## 2024-04-01 ENCOUNTER — TELEPHONE (OUTPATIENT)
Dept: PRIMARY CARE | Facility: CLINIC | Age: 74
End: 2024-04-01
Payer: MEDICARE

## 2024-04-01 NOTE — TELEPHONE ENCOUNTER
Discussed w pt, Orderes were done by Infection disease provider at [facility]. Pt will reach to them for further interventions.    ----- Message from Ashley Goddard MA sent at 4/1/2024  2:39 PM EDT -----   She has a afb/smear culture tested positive   ordering doctor  Nadia Galicia  /Transplant Infectious Diseases  Says she is a , Medicine

## 2024-04-02 ENCOUNTER — OFFICE VISIT (OUTPATIENT)
Dept: PULMONOLOGY | Facility: HOSPITAL | Age: 74
End: 2024-04-02
Payer: MEDICARE

## 2024-04-02 VITALS
DIASTOLIC BLOOD PRESSURE: 64 MMHG | HEART RATE: 89 BPM | WEIGHT: 125 LBS | TEMPERATURE: 95.6 F | SYSTOLIC BLOOD PRESSURE: 100 MMHG | OXYGEN SATURATION: 95 % | BODY MASS INDEX: 19.58 KG/M2

## 2024-04-02 DIAGNOSIS — D83.9 COMMON VARIABLE IMMUNODEFICIENCY (MULTI): ICD-10-CM

## 2024-04-02 DIAGNOSIS — A31.0 MYCOBACTERIUM AVIUM-INTRACELLULARE INFECTION (MULTI): ICD-10-CM

## 2024-04-02 DIAGNOSIS — J47.9 BRONCHIECTASIS WITHOUT ACUTE EXACERBATION (MULTI): Primary | ICD-10-CM

## 2024-04-02 PROCEDURE — 1159F MED LIST DOCD IN RCRD: CPT | Performed by: INTERNAL MEDICINE

## 2024-04-02 PROCEDURE — 1160F RVW MEDS BY RX/DR IN RCRD: CPT | Performed by: INTERNAL MEDICINE

## 2024-04-02 PROCEDURE — 99214 OFFICE O/P EST MOD 30 MIN: CPT | Performed by: INTERNAL MEDICINE

## 2024-04-02 PROCEDURE — 1126F AMNT PAIN NOTED NONE PRSNT: CPT | Performed by: INTERNAL MEDICINE

## 2024-04-02 PROCEDURE — 1157F ADVNC CARE PLAN IN RCRD: CPT | Performed by: INTERNAL MEDICINE

## 2024-04-02 SDOH — ECONOMIC STABILITY: FOOD INSECURITY: WITHIN THE PAST 12 MONTHS, THE FOOD YOU BOUGHT JUST DIDN'T LAST AND YOU DIDN'T HAVE MONEY TO GET MORE.: NEVER TRUE

## 2024-04-02 SDOH — ECONOMIC STABILITY: FOOD INSECURITY: WITHIN THE PAST 12 MONTHS, YOU WORRIED THAT YOUR FOOD WOULD RUN OUT BEFORE YOU GOT MONEY TO BUY MORE.: NEVER TRUE

## 2024-04-02 ASSESSMENT — LIFESTYLE VARIABLES
HOW OFTEN DO YOU HAVE A DRINK CONTAINING ALCOHOL: NEVER
SKIP TO QUESTIONS 9-10: 1
AUDIT-C TOTAL SCORE: 0
HOW OFTEN DO YOU HAVE SIX OR MORE DRINKS ON ONE OCCASION: NEVER
HOW MANY STANDARD DRINKS CONTAINING ALCOHOL DO YOU HAVE ON A TYPICAL DAY: PATIENT DOES NOT DRINK

## 2024-04-02 ASSESSMENT — PATIENT HEALTH QUESTIONNAIRE - PHQ9
1. LITTLE INTEREST OR PLEASURE IN DOING THINGS: NOT AT ALL
SUM OF ALL RESPONSES TO PHQ9 QUESTIONS 1 & 2: 0
2. FEELING DOWN, DEPRESSED OR HOPELESS: NOT AT ALL

## 2024-04-02 ASSESSMENT — PAIN SCALES - GENERAL: PAINLEVEL: 0-NO PAIN

## 2024-04-02 NOTE — PROGRESS NOTES
Last visit in this clinic was 05/02/2023    Subjective   HPI  Patient ID: Abad Yan is a 73 y.o. female patient who presents today for Pt here today for FUV. 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    She has a history of MAC - treated in 2017 and 2019. Has been following up with Dr Galicia from ID at [facility]. isolation of Mycobacterium intracellulare from sputum culture is reported. On chest CT from January 2024 -  1. Extensive, multifocal areas of mucous plugging, bronchiectasis, bronchial wall thickening, and mosaic attenuation with associated areas of tree-in-bud nodularity, more prominent in the right lung but also involving the lingula. Findings are in keeping with airway disease. Given the appearance and distribution, cannot exclude superimposed infection including atypical mycobacterial infection  2. Interval enlargement and thickening of the walls of the pleural-based right upper lobe cavitating lesion which may correspond with the above mentioned atypical infection. Recommend however a short-term follow-up in 3 months to assess for stability.   3. New solid and ground-glass pulmonary nodules are seen and described in detail above. Continued attention on follow-up is recommended.  4. 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  Patient with bronchiectasis flare with hemoptysis treated with levofloxacin in 11/2023, recent treatment for sinusitis with course of doxycycline in 4/2024. The patient reports she is doing well. She has an intermittent, unproductive cough and intermittent headaches. She feels short of breath only when walking up many flights of stairs. She denies fever, chills, loss of appetite, or weight changes. She uses albuterol 2.5 mg nebulizer and Atrovent nasal spray 2x a day. She uses vest on chest 2x  a day. She is not currently using an inhaler. Fatigue is noted. No hemoptysis. No weight change     Objective   Physical Exam  Vitals:    04/02/24 1404   BP: 100/64   Pulse: 89   Temp: 35.3 °C (95.6 °F)   SpO2: 95%     GENERAL: normal appearance. Well nourished. No respiratory distress  HEAD/SINUSES: no sinus tenderness  OROPHARYNX: Moist mucosa, no thrush or lesions -   NECK: no JVD, midline trachea without stridor  LYMPH NODES: none felt in the cervical, submandibular or supraclavicular regions  LUNGS: Clear lung fields anteriorly and posteriorly with no wheeze, crackles or rhonchi   CARDIAC: normal S1 and S2; no gallops, rubs or murmurs. Regular rate and rhythm  EXTREMITIES: No edema,  NEURO: grossly normal mental status, CN reflexes and motor strength.   SKIN: Skin turgor normal. No rashes or lesions.   PSYCH: Normal affect     Complete Pulmonary Function and Stress Test from 03/28/24 was reviewed with patient today showing mild obstructive ventilatory defect    Assessment/Plan    1. Bronchiectasis without acute exacerbation (Multi)  No active infection. Concerned about recurrence of MAC. Changes on chest CT from January show worsening when compared to prior scans. However, this was done after a recent infection with hemoptysis. Therefore, will re-scan chest to assess for any spontaneous resolution.     2. Mycobacterium avium-intracellulare infection (Multi)  Treated in 2017 and 2019. Single isolation may represent colonization. Sending more samples is in order    3. Common variable immunodeficiency (Multi)  On replacement therapy       There is a concern that there is reactivation of the MAC infection given the findings on the CT scan of the chest  Will send sputum culture for AFB.  This will complement with Dr. Galicia which has  If the cultures are not conclusive we will proceed with bronchoscopy  I will contact Dr. Galicia which to see how she will to discuss further plans  To improve your ability to  expectorate please stop the ipratropium aerosol  Start using saline nebulizer with the vest therapy after an albuterol treatment  Follow-up in this clinic in the next 3 months  Scribe Attestation  By signing my name below, I, Brandon Beaver, Scribsamantha   attest that this documentation has been prepared under the direction and in the presence of Crissy Gil MD.

## 2024-04-02 NOTE — PATIENT INSTRUCTIONS
Thank you for allowing me to participate in your health care today.    The primary respiratory problem that we discussed is bronchiectasis and old MAC infection  There is a concern that there is reactivation of the MAC infection given the findings on the CT scan of the chest  Will send sputum culture for AFB.  This will complement with Dr. Galicia which has  If the cultures are not conclusive we will proceed with bronchoscopy  I will contact Dr. Galicia which to see how she will to discuss further plans  To improve your ability to expectorate please stop the ipratropium aerosol  Start using saline nebulizer with the vest therapy after an albuterol treatment  Follow-up in this clinic in the next 3 months    Please call [phone] ([facility]) to get your tests scheduled.     Please call [phone] to schedule a follow up appointment with me.    Unless deemed urgent or emergent, the result(s) of any tests ordering during this clinic visit will be reviewed during your follow-up visit. Depending on the urgency of the result(s), I may call or send you a [employer] message.

## 2024-04-05 ENCOUNTER — APPOINTMENT (OUTPATIENT)
Dept: PHYSICAL THERAPY | Facility: CLINIC | Age: 74
End: 2024-04-05
Payer: MEDICARE

## 2024-04-09 ENCOUNTER — APPOINTMENT (OUTPATIENT)
Dept: PULMONOLOGY | Facility: HOSPITAL | Age: 74
End: 2024-04-09
Payer: MEDICARE

## 2024-04-09 DIAGNOSIS — J47.9 BRONCHIECTASIS WITHOUT ACUTE EXACERBATION (MULTI): ICD-10-CM

## 2024-04-09 NOTE — PROGRESS NOTES
Pt called stating that she would like her sodium chloride 3% solution to be sent to [facility] in [place]. Order placed and routed to Dr. Gil to sign.

## 2024-04-11 ENCOUNTER — TREATMENT (OUTPATIENT)
Dept: PHYSICAL THERAPY | Facility: CLINIC | Age: 74
End: 2024-04-11
Payer: MEDICARE

## 2024-04-11 DIAGNOSIS — M25.512 CHRONIC LEFT SHOULDER PAIN: Primary | ICD-10-CM

## 2024-04-11 DIAGNOSIS — M62.81 MUSCLE WEAKNESS ON EXAMINATION: ICD-10-CM

## 2024-04-11 DIAGNOSIS — G89.29 CHRONIC LEFT SHOULDER PAIN: Primary | ICD-10-CM

## 2024-04-11 PROCEDURE — 97110 THERAPEUTIC EXERCISES: CPT | Mod: GP

## 2024-04-11 NOTE — PROGRESS NOTES
Physical Therapy  Physical Therapy Treatment Note    Patient Name: Abad Yan  MRN: [id number]  Today's Date: 4/11/2024  Time Calculation  Start Time: 1230  Stop Time: 1315  Time Calculation (min): 45 min    PT Therapeutic Procedures Time Entry  Therapeutic Exercise Time Entry: 40            Insurance:  Visit number: 2 of MN  Authorization info: none  Certification dates:   3/21/24 to 5/21/24    Current Problem  1. Chronic left shoulder pain  Follow Up In Physical Therapy      2. Muscle weakness on examination  Follow Up In Physical Therapy            Precautions       Subjective:     Patient reports that she was able to adjust her computer screen and doing much better. She reports no new symptoms in her shoulder    Pain       Objective:         Treatments:   - Pulleys x standing x 10 reps  - seated flexion x 25 reps   - wall slide with hold at top  - standing  ER at the wall x 30 secs   - seated lumbar stretch with a ball   - standing ER with strap x 5 secs x 10 reps  - PROM of the left ER, tick tocks  - isometrics flexion, Ext, IR and ER x 6 secs x 10 reps   - review of HEP and guidelines    Assessment:       Pt was able to tolerate the session well. She had no increase in reactivity or irritability after the session    Plan: continue to work on active strengthening, stabilization, and postural endurance        Goals:    Patient  will report an improvement in sleeping at night by > 50%.  Patient  will report a decrease in left shoulder pain by > 2 on the NRPS .   Pt will improve scapular stabilizer strength by > 1/2 MMT to improve posture and posterior chain strength.  Patient will require no verbal cues to assist with proper posture during her sessions.   Pt will decrease DASH by > 2%.   Patient  will be independent with her HEP.     Amanda Muse, PT

## 2024-04-19 ENCOUNTER — LAB REQUISITION (OUTPATIENT)
Dept: LAB | Facility: HOSPITAL | Age: 74
End: 2024-04-19
Payer: MEDICARE

## 2024-04-19 ENCOUNTER — OFFICE VISIT (OUTPATIENT)
Dept: PRIMARY CARE | Facility: CLINIC | Age: 74
End: 2024-04-19
Payer: MEDICARE

## 2024-04-19 VITALS
SYSTOLIC BLOOD PRESSURE: 92 MMHG | DIASTOLIC BLOOD PRESSURE: 58 MMHG | RESPIRATION RATE: 16 BRPM | TEMPERATURE: 96.3 F | HEIGHT: 67 IN | WEIGHT: 125.6 LBS | OXYGEN SATURATION: 100 % | BODY MASS INDEX: 19.71 KG/M2 | HEART RATE: 62 BPM

## 2024-04-19 DIAGNOSIS — K21.00 GASTROESOPHAGEAL REFLUX DISEASE WITH ESOPHAGITIS WITHOUT HEMORRHAGE: ICD-10-CM

## 2024-04-19 DIAGNOSIS — J47.9 BRONCHIECTASIS, UNCOMPLICATED (MULTI): ICD-10-CM

## 2024-04-19 DIAGNOSIS — I70.0 ATHEROSCLEROSIS OF AORTA (CMS-HCC): Primary | ICD-10-CM

## 2024-04-19 DIAGNOSIS — G44.229 CHRONIC TENSION-TYPE HEADACHE, NOT INTRACTABLE: ICD-10-CM

## 2024-04-19 PROBLEM — R51.9 WORSENING HEADACHES: Status: RESOLVED | Noted: 2023-09-13 | Resolved: 2024-04-19

## 2024-04-19 PROCEDURE — 1157F ADVNC CARE PLAN IN RCRD: CPT | Performed by: INTERNAL MEDICINE

## 2024-04-19 PROCEDURE — 99214 OFFICE O/P EST MOD 30 MIN: CPT | Performed by: INTERNAL MEDICINE

## 2024-04-19 PROCEDURE — 1159F MED LIST DOCD IN RCRD: CPT | Performed by: INTERNAL MEDICINE

## 2024-04-19 ASSESSMENT — PATIENT HEALTH QUESTIONNAIRE - PHQ9
2. FEELING DOWN, DEPRESSED OR HOPELESS: NOT AT ALL
SUM OF ALL RESPONSES TO PHQ9 QUESTIONS 1 AND 2: 0
1. LITTLE INTEREST OR PLEASURE IN DOING THINGS: NOT AT ALL

## 2024-04-19 ASSESSMENT — ENCOUNTER SYMPTOMS
OCCASIONAL FEELINGS OF UNSTEADINESS: 0
LOSS OF SENSATION IN FEET: 0
DEPRESSION: 0

## 2024-04-19 NOTE — PROGRESS NOTES
"Subjective   Patient ID: ELIANE Yan is a 73 y.o. female who presents for headaches and shoulder pain    HPI Pt started shoulder physical therapy 2 weeks ago, she changed her posture, new ergonomic desk and since then her headaches are resolved. She admits shoulder exercise also help but no doing exercise often. She continues walking and soon will be biking.  She saw pulmonologist who ordered new sputum samples collected this week. Advised to stop ipatropium oral inhaler to help with coughing.  Discussed vaccines     Review of Systems   HENT:          Feels tongue numb and sore cervical glands for 1 week. Dry mouth of    Cardiovascular:  Negative for chest pain.   All other systems reviewed and are negative.    Bm daily with daily fiber    Objective   BP 92/58 (BP Location: Left arm, Patient Position: Sitting, BP Cuff Size: Adult)   Pulse 62   Temp 35.7 °C (96.3 °F)   Resp 16   Ht 1.702 m (5' 7\")   Wt 57 kg (125 lb 9.6 oz)   LMP  (LMP Unknown)   SpO2 100%   BMI 19.67 kg/m²     Physical Exam  Eyes- Conjunctiva clear  HEENT clear tms no sinus tenderness, no enlarged parotid glands, oral cavity semidry  Neck-no thyromegaly  Cardiac- regular rate and rhythm, no murmurs  Lung- clear to auscultation  GI- present  bowel sounds, nontender, no rebound  MSK- no deformities  Extremities- no edema, good distal pulses  Neuro- non focal, oriented x 3  Psychiatric- pleasant, well groomed, no hallucinations    Assessment/Plan   Problem List Items Addressed This Visit             ICD-10-CM    Chronic headache R51.9, G89.29     Continue good posture at desk, and shoulder physical therapy         GERD (gastroesophageal reflux disease) K21.9     Well control         Atherosclerosis of aorta (CMS-HCC) - Primary I70.0     Continues statin  LDL at goal               "

## 2024-04-19 NOTE — PATIENT INSTRUCTIONS
Please continue physical therapy, complete at local pharmacy tetanus/whooping cough vaccine ( tdap ) any time. Make sure your multivitamin has b 12,if not add 500 mgc daily of b 12. Also use dry mouth lozanges or and oral rinse ( biotene).   Continue physical therapy. Return sooner than July as needed.

## 2024-04-20 ENCOUNTER — LAB REQUISITION (OUTPATIENT)
Dept: LAB | Facility: HOSPITAL | Age: 74
End: 2024-04-20
Payer: MEDICARE

## 2024-04-20 DIAGNOSIS — J47.9 BRONCHIECTASIS, UNCOMPLICATED (MULTI): ICD-10-CM

## 2024-04-25 ENCOUNTER — TREATMENT (OUTPATIENT)
Dept: PHYSICAL THERAPY | Facility: CLINIC | Age: 74
End: 2024-04-25
Payer: MEDICARE

## 2024-04-25 DIAGNOSIS — G89.29 CHRONIC LEFT SHOULDER PAIN: ICD-10-CM

## 2024-04-25 DIAGNOSIS — M62.81 MUSCLE WEAKNESS ON EXAMINATION: ICD-10-CM

## 2024-04-25 DIAGNOSIS — M25.512 CHRONIC LEFT SHOULDER PAIN: ICD-10-CM

## 2024-04-25 PROCEDURE — 97110 THERAPEUTIC EXERCISES: CPT | Mod: GP

## 2024-04-25 NOTE — PROGRESS NOTES
Physical Therapy  Physical Therapy Treatment Note    Patient Name: Abad Yan  MRN: [id number]  Today's Date: 4/25/2024  Time Calculation  Start Time: 0845  Stop Time: 0930  Time Calculation (min): 45 min    PT Therapeutic Procedures Time Entry  Therapeutic Exercise Time Entry: 40            Insurance:  Visit number: 3 of [place]  Authorization info: none  Certification dates:   3/21/24 to 5/21/24    Current Problem  1. Chronic left shoulder pain  Follow Up In Physical Therapy      2. Muscle weakness on examination  Follow Up In Physical Therapy            Precautions       Subjective:     Pt reports that most difficulty with reaching behind putting on her bra as well putting on her coat.  Overall it is doing ok     Pain       Objective:         Treatments:   - Pulleys x standing x 10 reps  - sleeper stretch 10 secs x 10 reps   - supine flexion x 10 reps  - PROM of the left ER, tick tocks  - PROM flexion/ER  - isometrics flexion, Ext, IR and ER x 6 secs x 10 reps   - pec stretch manual  - review of HEP and guidelines    Assessment:       Pt making good progress towards all goals, demonstrated increased mobility and strength with increased tolerance for therapeutic exercise.      Plan: continue to work on active strengthening, stabilization, and postural endurance        Goals:    Patient  will report an improvement in sleeping at night by > 50%.  Patient  will report a decrease in left shoulder pain by > 2 on the NRPS .   Pt will improve scapular stabilizer strength by > 1/2 MMT to improve posture and posterior chain strength.  Patient will require no verbal cues to assist with proper posture during her sessions.   Pt will decrease DASH by > 2%.   Patient  will be independent with her HEP.     Amanda Muse, PT

## 2024-05-09 ENCOUNTER — OFFICE VISIT (OUTPATIENT)
Dept: DERMATOLOGY | Facility: CLINIC | Age: 74
End: 2024-05-09
Payer: MEDICARE

## 2024-05-09 DIAGNOSIS — L57.8 ACTINIC SKIN DAMAGE: ICD-10-CM

## 2024-05-09 DIAGNOSIS — D22.9 MULTIPLE BENIGN NEVI: Primary | ICD-10-CM

## 2024-05-09 DIAGNOSIS — D18.01 HEMANGIOMA OF SKIN: ICD-10-CM

## 2024-05-09 DIAGNOSIS — L57.0 ACTINIC KERATOSIS: ICD-10-CM

## 2024-05-09 DIAGNOSIS — Z12.83 SCREENING EXAM FOR SKIN CANCER: ICD-10-CM

## 2024-05-09 DIAGNOSIS — D84.9 IMMUNOCOMPROMISED STATE (MULTI): ICD-10-CM

## 2024-05-09 DIAGNOSIS — L82.1 SEBORRHEIC KERATOSIS: ICD-10-CM

## 2024-05-09 DIAGNOSIS — L98.8 RHYTIDES: ICD-10-CM

## 2024-05-09 DIAGNOSIS — L81.4 LENTIGO: ICD-10-CM

## 2024-05-09 PROCEDURE — 1036F TOBACCO NON-USER: CPT | Performed by: DERMATOLOGY

## 2024-05-09 PROCEDURE — 99213 OFFICE O/P EST LOW 20 MIN: CPT | Performed by: DERMATOLOGY

## 2024-05-09 PROCEDURE — 1159F MED LIST DOCD IN RCRD: CPT | Performed by: DERMATOLOGY

## 2024-05-09 PROCEDURE — 1160F RVW MEDS BY RX/DR IN RCRD: CPT | Performed by: DERMATOLOGY

## 2024-05-09 PROCEDURE — 1157F ADVNC CARE PLAN IN RCRD: CPT | Performed by: DERMATOLOGY

## 2024-05-09 PROCEDURE — 17003 DESTRUCT PREMALG LES 2-14: CPT | Performed by: DERMATOLOGY

## 2024-05-09 PROCEDURE — 17000 DESTRUCT PREMALG LESION: CPT | Performed by: DERMATOLOGY

## 2024-05-09 RX ORDER — TRETINOIN 0.25 MG/G
CREAM TOPICAL NIGHTLY
Qty: 45 G | Refills: 3 | Status: SHIPPED | OUTPATIENT
Start: 2024-05-09 | End: 2025-05-09

## 2024-05-09 ASSESSMENT — DERMATOLOGY QUALITY OF LIFE (QOL) ASSESSMENT
RATE HOW BOTHERED YOU ARE BY EFFECTS OF YOUR SKIN PROBLEMS ON YOUR ACTIVITIES (EG, GOING OUT, ACCOMPLISHING WHAT YOU WANT, WORK ACTIVITIES OR YOUR RELATIONSHIPS WITH OTHERS): 0 - NEVER BOTHERED
RATE HOW EMOTIONALLY BOTHERED YOU ARE BY YOUR SKIN PROBLEM (FOR EXAMPLE, WORRY, EMBARRASSMENT, FRUSTRATION): 0 - NEVER BOTHERED
ARE THERE EXCLUSIONS OR EXCEPTIONS FOR THE QUALITY OF LIFE ASSESSMENT: NO
DATE THE QUALITY-OF-LIFE ASSESSMENT WAS COMPLETED: 66969
RATE HOW BOTHERED YOU ARE BY SYMPTOMS OF YOUR SKIN PROBLEM (EG, ITCHING, STINGING BURNING, HURTING OR SKIN IRRITATION): 0 - NEVER BOTHERED

## 2024-05-09 ASSESSMENT — ITCH NUMERIC RATING SCALE: HOW SEVERE IS YOUR ITCHING?: 0

## 2024-05-09 ASSESSMENT — DERMATOLOGY PATIENT ASSESSMENT: DO YOU HAVE ANY NEW OR CHANGING LESIONS: NO

## 2024-05-09 ASSESSMENT — PATIENT GLOBAL ASSESSMENT (PGA): PATIENT GLOBAL ASSESSMENT: PATIENT GLOBAL ASSESSMENT:  1 - CLEAR

## 2024-05-09 NOTE — PATIENT INSTRUCTIONS
"Diminishing dark marks, evening skin tone, helping with fine lines and wrinkles    The most important thing is sunscreen, sunscreen, sunscreen. The sun will keep making darker marks darker, so it is very important to wear sunscreen every day and every time you go outside. If you miss just one time, the dark marks can start to come back. The mineral sunscreens protect the skin from all wavelengths of sunlight, which is why these are preferred.    Retinols (over the counter) and retinoids (prescription versions) help by acting like a low-grade chemical to even the skin tone and promoting collagen production to diminish fine lines and wrinkles. Retinols/retinoids can be irritating for people with sensitive skin and should NOT be used if pregnant, breast-feeding. They can also cause burns if used in the same location where you wax hair.       Mineral Sunscreen, usually with zinc. If these are too thick/white/chalky look for \"tinted\" ones  EltaMD tinted ([employer]), Neutrogena Mineral tinted      Evening retinoid - Tretinoin  Pea-sized amount to cover the whole face  Start every 2-3 nights and gradually increase to nightly   "

## 2024-05-09 NOTE — PROGRESS NOTES
Subjective     ELIANE Yan is a 73 y.o. female who presents for the following: Skin Check (Pt concerned about spot on her right temple.).     Last derm visit 5/2023 for Full Skin Exam - no lesions of concer. Questions about rhytides, tretinoin 0.025% cream, immunosuppressed on IVIG    Review of Systems:  No other skin or systemic complaints other than what is documented elsewhere in the note.    The following portions of the chart were reviewed this encounter and updated as appropriate:  Tobacco  Allergies  Meds  Problems  Med Hx  Surg Hx         Skin Cancer History  No skin cancer on file.      Specialty Problems          Dermatology Problems    Hemangioma of skin and subcutaneous tissue    Melanocytic nevi of left upper limb, including shoulder    Melanocytic nevi of lower limb, including hip    Melanocytic nevi of trunk    Other melanin hyperpigmentation    Other seborrheic keratosis    Rhytidosis facialis    Skin mole        Objective   Well appearing patient in no apparent distress; mood and affect are within normal limits.    A full examination was performed including scalp, head, eyes, ears, nose, lips, neck, chest, axillae, abdomen, back, buttocks, bilateral upper extremities, bilateral lower extremities, hands, feet, fingers, toes, fingernails, and toenails. All findings within normal limits unless otherwise noted below.    Assessment/Plan   1. Multiple benign nevi  Brown and tan macules and papules with reassuring findings on dermoscopy    -These lesions have benign, reassuring patterns on dermoscopy  -Recommend continued self observation, and to contact the office if any changes in nevi are noticed    2. Lentigo  Tan macules    -Benign appearing on exam  -Reassurance, recommend observation    3. Seborrheic keratosis  Stuck on, waxy macule(s)/papule(s)/plaque(s) with comedo-like openings and milia like cysts    -Discussed the nature of the diagnosis  -Reassurance, recommend continued  observation    4. Hemangioma of skin  Cherry red papules    -Discussed the nature of the diagnosis  -Reassurance, recommend continued observation    5. Actinic skin damage  Background of photodamage with hyper- and hypo-pigmented macules on the skin    6. Screening exam for skin cancer  As part of a routine Full Skin Exam, a genital examination with the presence of a chaperone was offered. The patient agreed to  the exam and declined the chaperone.       Full body skin exam  -No lesions concerning for malignancy on the remainder the skin exam today   - The ugly duckling sign was discussed. Monitor for any skin lesions that are different in color, shape, or size than others on body  -Sun protection was discussed. Recommend SPF 30+, hats with brims, sun protective clothing, and avoiding sun exposure between 10 AM and 2 PM whenever possible  -Recommend regular skin exams or sooner if new or changing lesions       Related Procedures  Follow Up In Dermatology - Established Patient    7. Immunocompromised state (Multi)    8. Actinic keratosis (2)  Left Buccal Cheek, Right Lower Eyelid  Erythematous scaly macule(s)    -Discussed nature of diagnosis and treatment options.   -Patient wishes to proceed with Cryotherapy today  -Possible side effects of liquid nitrogen treatment reviewed including formation of blisters, crusting, tenderness, scar, and discoloration which may be permanent.  -Patient advised to return the office for re-evaluation if the treated lesion(s) do not resolve within 4-6 weeks. Patient verbalizes understanding.    Destr of lesion - Left Buccal Cheek, Right Lower Eyelid  Complexity: simple    Destruction method: cryotherapy    Informed consent: discussed and consent obtained    Lesion destroyed using liquid nitrogen: Yes    Outcome: patient tolerated procedure well with no complications    Post-procedure details: wound care instructions given      Related Procedures  Follow Up In Dermatology - Established  Patient    9. Rhytides  Head - Anterior (Face)  Wrinkles or fine lines.    Recommend to use sunscreen protection daily with at least SPF30, mineral sunscreens are best  -Recommend OTC retinol or Rx retinoid  - Patient elects for tretinoin  - See further information in AVS    - never got rx last time, will print again    Related Medications  tretinoin (Retin-A) 0.025 % cream  Apply topically once daily at bedtime. A pea-sized amount to the whole face, start every 2-3 nights and gradually increase to nightly

## 2024-05-14 ENCOUNTER — TREATMENT (OUTPATIENT)
Dept: PHYSICAL THERAPY | Facility: CLINIC | Age: 74
End: 2024-05-14
Payer: MEDICARE

## 2024-05-14 DIAGNOSIS — G89.29 CHRONIC LEFT SHOULDER PAIN: ICD-10-CM

## 2024-05-14 DIAGNOSIS — M25.512 CHRONIC LEFT SHOULDER PAIN: ICD-10-CM

## 2024-05-14 DIAGNOSIS — M62.81 MUSCLE WEAKNESS ON EXAMINATION: ICD-10-CM

## 2024-05-14 PROCEDURE — 97110 THERAPEUTIC EXERCISES: CPT | Mod: GP

## 2024-05-14 NOTE — PROGRESS NOTES
Physical Therapy  Physical Therapy Treatment Note    Patient Name: Abad Yan  MRN: [id number]  Today's Date: 2024  Time Calculation  Start Time:   Stop Time: 0  Time Calculation (min): 45 min    PT Therapeutic Procedures Time Entry  Therapeutic Exercise Time Entry: 40            Insurance:  Visit number: 4 of MN  Authorization info: none  Certification dates:   3/21/24 to 24    Current Problem  1. Chronic left shoulder pain  Follow Up In Physical Therapy      2. Muscle weakness on examination  Follow Up In Physical Therapy              Precautions       Subjective:     Pt reports that she is doing.  She starts sleeping on the right side 2/3 of the night and the other part of the night sleeping on the left has done better    Pain     1-2/10 with certain movements ( hand behind the back)  Not at rest    Objective:    Full AROM of the left shoulder, no pain into resistance in any plane  Flex 4+/5  ER:  4/5  IR: 4/5  Abd:  4/5    Negative behind the back      Treatments:   - Pulleys x standing x 20 reps  - sleeper stretch 10 secs x 15 reps   - PROM of the left shoulder flexion, Ext, IR  - mid row x 15 reps RTB  - low row 15 reps with RTB  - standing  x 15 reps   - review of HEP and guidelines    Assessment:         Pt demonstrates improvement in all areas. She demonstrates full ROM and good strength of the left UE.  She is going to continue on her own at this time.      Plan:     Discharge Summary    Name: Abad Yan  MRN: [id number]  : 1950  Date: 24    Discharge Summary: PT    Rehab Discharge Reason: Achieved all and/or the most significant goals(s)      Goals:    Patient  will report an improvement in sleeping at night by > 50%. MET  Patient  will report a decrease in left shoulder pain by > 2 on the NRPS . MEt  Pt will improve scapular stabilizer strength by > 1/2 MMT to improve posture and posterior chain strength. MET  Patient will require no verbal cues  to assist with proper posture during her sessions. MET  Pt will decrease DASH by > 2%. MET  Patient  will be independent with her HEP. MET    Amanda Muse, PT

## 2024-05-23 ENCOUNTER — APPOINTMENT (OUTPATIENT)
Dept: PHYSICAL THERAPY | Facility: CLINIC | Age: 74
End: 2024-05-23
Payer: MEDICARE

## 2024-05-23 LAB
ACID FAST STN SPEC: ABNORMAL
LABORATORY COMMENT REPORT: ABNORMAL
MYCOBACTERIUM SPEC CULT: ABNORMAL

## 2024-06-05 DIAGNOSIS — R05.3 CHRONIC COUGH: Primary | ICD-10-CM

## 2024-06-06 ENCOUNTER — LAB (OUTPATIENT)
Dept: LAB | Facility: LAB | Age: 74
End: 2024-06-06
Payer: MEDICARE

## 2024-06-06 DIAGNOSIS — R05.3 CHRONIC COUGH: ICD-10-CM

## 2024-06-06 LAB
ACID FAST STN SPEC: NORMAL
MYCOBACTERIUM SPEC CULT: NORMAL

## 2024-06-06 PROCEDURE — 87205 SMEAR GRAM STAIN: CPT

## 2024-06-07 LAB
BACTERIA SPEC RESP CULT: ABNORMAL
GRAM STN SPEC: ABNORMAL
GRAM STN SPEC: ABNORMAL

## 2024-06-09 PROBLEM — J47.9 BRONCHIECTASIS WITHOUT ACUTE EXACERBATION (MULTI): Status: ACTIVE | Noted: 2023-09-13

## 2024-06-09 PROBLEM — A31.0 MYCOBACTERIUM AVIUM-INTRACELLULARE INFECTION (MULTI): Status: ACTIVE | Noted: 2024-06-09

## 2024-06-10 ENCOUNTER — LAB (OUTPATIENT)
Dept: LAB | Facility: LAB | Age: 74
End: 2024-06-10
Payer: MEDICARE

## 2024-06-10 DIAGNOSIS — E78.00 HYPERCHOLESTEREMIA: Primary | ICD-10-CM

## 2024-06-10 DIAGNOSIS — J47.9 BRONCHIECTASIS WITHOUT ACUTE EXACERBATION (MULTI): ICD-10-CM

## 2024-06-10 LAB — HOLD SPECIMEN: NORMAL

## 2024-06-10 PROCEDURE — 87206 SMEAR FLUORESCENT/ACID STAI: CPT

## 2024-06-10 PROCEDURE — 87116 MYCOBACTERIA CULTURE: CPT

## 2024-06-10 PROCEDURE — 87015 SPECIMEN INFECT AGNT CONCNTJ: CPT

## 2024-06-11 RX ORDER — ROSUVASTATIN CALCIUM 10 MG/1
10 TABLET, COATED ORAL DAILY
Qty: 90 TABLET | Refills: 0 | Status: SHIPPED | OUTPATIENT
Start: 2024-06-11

## 2024-06-12 ENCOUNTER — TELEPHONE (OUTPATIENT)
Dept: PRIMARY CARE | Facility: CLINIC | Age: 74
End: 2024-06-12
Payer: MEDICARE

## 2024-06-12 LAB
ACID FAST STN SPEC: NORMAL
MYCOBACTERIUM SPEC CULT: NORMAL

## 2024-06-17 DIAGNOSIS — G25.81 RESTLESS LEGS SYNDROME: ICD-10-CM

## 2024-06-17 RX ORDER — ROPINIROLE 4 MG/1
4 TABLET, FILM COATED ORAL NIGHTLY
Qty: 90 TABLET | Refills: 1 | Status: SHIPPED | OUTPATIENT
Start: 2024-06-17

## 2024-06-18 LAB
ACID FAST STN SPEC: ABNORMAL
LABORATORY COMMENT REPORT: ABNORMAL
MYCOBACTERIUM SPEC CULT: ABNORMAL

## 2024-06-19 ENCOUNTER — TELEPHONE (OUTPATIENT)
Dept: PRIMARY CARE | Facility: CLINIC | Age: 74
End: 2024-06-19
Payer: MEDICARE

## 2024-06-19 DIAGNOSIS — H91.93 BILATERAL HEARING LOSS, UNSPECIFIED HEARING LOSS TYPE: ICD-10-CM

## 2024-06-19 LAB
ACID FAST STN SPEC: NORMAL
MYCOBACTERIUM SPEC CULT: NORMAL

## 2024-06-20 DIAGNOSIS — J18.9 PNEUMONIA DUE TO INFECTIOUS ORGANISM, UNSPECIFIED LATERALITY, UNSPECIFIED PART OF LUNG: Primary | ICD-10-CM

## 2024-06-26 ENCOUNTER — LAB (OUTPATIENT)
Dept: LAB | Facility: LAB | Age: 74
End: 2024-06-26
Payer: MEDICARE

## 2024-06-26 DIAGNOSIS — J06.9 ACUTE UPPER RESPIRATORY INFECTION, UNSPECIFIED: ICD-10-CM

## 2024-06-26 DIAGNOSIS — Z79.899 OTHER LONG TERM (CURRENT) DRUG THERAPY: ICD-10-CM

## 2024-06-26 DIAGNOSIS — J47.9 BRONCHIECTASIS, UNCOMPLICATED (MULTI): Primary | ICD-10-CM

## 2024-06-26 LAB
ACID FAST STN SPEC: NORMAL
MYCOBACTERIUM SPEC CULT: NORMAL

## 2024-06-26 PROCEDURE — 87070 CULTURE OTHR SPECIMN AEROBIC: CPT

## 2024-06-26 PROCEDURE — 87075 CULTR BACTERIA EXCEPT BLOOD: CPT

## 2024-06-26 PROCEDURE — 87205 SMEAR GRAM STAIN: CPT

## 2024-06-26 PROCEDURE — 87186 SC STD MICRODIL/AGAR DIL: CPT

## 2024-06-30 LAB
BACTERIA SPEC RESP CULT: ABNORMAL
BACTERIA SPEC RESP CULT: ABNORMAL
GRAM STN SPEC: ABNORMAL
GRAM STN SPEC: ABNORMAL

## 2024-07-02 ENCOUNTER — OFFICE VISIT (OUTPATIENT)
Dept: PULMONOLOGY | Facility: HOSPITAL | Age: 74
End: 2024-07-02
Payer: MEDICARE

## 2024-07-02 VITALS
TEMPERATURE: 97.4 F | SYSTOLIC BLOOD PRESSURE: 113 MMHG | OXYGEN SATURATION: 98 % | WEIGHT: 120 LBS | HEART RATE: 65 BPM | DIASTOLIC BLOOD PRESSURE: 72 MMHG | BODY MASS INDEX: 18.79 KG/M2

## 2024-07-02 DIAGNOSIS — J47.0 BRONCHIECTASIS WITH ACUTE LOWER RESPIRATORY INFECTION (MULTI): Primary | ICD-10-CM

## 2024-07-02 DIAGNOSIS — A31.0 MYCOBACTERIUM AVIUM-INTRACELLULARE INFECTION (MULTI): ICD-10-CM

## 2024-07-02 PROCEDURE — 99213 OFFICE O/P EST LOW 20 MIN: CPT | Performed by: INTERNAL MEDICINE

## 2024-07-02 PROCEDURE — 1159F MED LIST DOCD IN RCRD: CPT | Performed by: INTERNAL MEDICINE

## 2024-07-02 PROCEDURE — 1126F AMNT PAIN NOTED NONE PRSNT: CPT | Performed by: INTERNAL MEDICINE

## 2024-07-02 PROCEDURE — 1157F ADVNC CARE PLAN IN RCRD: CPT | Performed by: INTERNAL MEDICINE

## 2024-07-02 RX ORDER — LEVOFLOXACIN 750 MG/1
750 TABLET ORAL DAILY
Qty: 10 TABLET | Refills: 0 | Status: SHIPPED | OUTPATIENT
Start: 2024-07-02 | End: 2024-07-03 | Stop reason: DRUGHIGH

## 2024-07-02 RX ORDER — LEVOFLOXACIN 750 MG/1
750 TABLET ORAL DAILY
Qty: 10 TABLET | Refills: 0 | Status: SHIPPED | OUTPATIENT
Start: 2024-07-02 | End: 2024-07-02 | Stop reason: SDUPTHER

## 2024-07-02 SDOH — ECONOMIC STABILITY: FOOD INSECURITY: WITHIN THE PAST 12 MONTHS, THE FOOD YOU BOUGHT JUST DIDN'T LAST AND YOU DIDN'T HAVE MONEY TO GET MORE.: NEVER TRUE

## 2024-07-02 SDOH — ECONOMIC STABILITY: FOOD INSECURITY: WITHIN THE PAST 12 MONTHS, YOU WORRIED THAT YOUR FOOD WOULD RUN OUT BEFORE YOU GOT MONEY TO BUY MORE.: NEVER TRUE

## 2024-07-02 ASSESSMENT — LIFESTYLE VARIABLES
HOW OFTEN DO YOU HAVE SIX OR MORE DRINKS ON ONE OCCASION: NEVER
HOW MANY STANDARD DRINKS CONTAINING ALCOHOL DO YOU HAVE ON A TYPICAL DAY: PATIENT DOES NOT DRINK
AUDIT-C TOTAL SCORE: 0
HOW OFTEN DO YOU HAVE A DRINK CONTAINING ALCOHOL: NEVER
SKIP TO QUESTIONS 9-10: 1

## 2024-07-02 ASSESSMENT — PATIENT HEALTH QUESTIONNAIRE - PHQ9
SUM OF ALL RESPONSES TO PHQ9 QUESTIONS 1 & 2: 3
2. FEELING DOWN, DEPRESSED OR HOPELESS: NOT AT ALL
SUM OF ALL RESPONSES TO PHQ9 QUESTIONS 1 & 2: 0
1. LITTLE INTEREST OR PLEASURE IN DOING THINGS: NOT AT ALL
10. IF YOU CHECKED OFF ANY PROBLEMS, HOW DIFFICULT HAVE THESE PROBLEMS MADE IT FOR YOU TO DO YOUR WORK, TAKE CARE OF THINGS AT HOME, OR GET ALONG WITH OTHER PEOPLE: SOMEWHAT DIFFICULT
1. LITTLE INTEREST OR PLEASURE IN DOING THINGS: NOT AT ALL
2. FEELING DOWN, DEPRESSED OR HOPELESS: NEARLY EVERY DAY

## 2024-07-02 ASSESSMENT — PAIN SCALES - GENERAL: PAINLEVEL: 0-NO PAIN

## 2024-07-02 NOTE — PROGRESS NOTES
Subjective   Patient ID: Abad Yan is a 73 y.o. female who presents for Pt here today for FUV.  HPI  Last visit in this clinic was 04/02/2024.    Abad Yan is a 73 y.o. 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 She is also being followed by Dr. Galicia who completed 2 sputum samples. The respiratory culture taken on 06/26/24  groing pseudomonas    The patient continues to experience dry coughing episodes throughout the day. She often feels fatigued but is unsure if this is related to her sickness or depression. Her cough interferes with her sleep. She discontinued use of sodium chloride on June 20 and had some hemoptysis. She gets SOB going up stairs. She utilizes albuterol, sodium chloride 3% with vest therapy.     Review of Systems  Review of Pertinent Systems:  Constitutional: no chills, no fever, no fatigue. Poor appetite. Stable weight  Eyes: no blurred vision and no dryness of the eyes.   ENT: no nasal congestion, no hoarseness, no sore throat, no postnasal drip.  Cardiovascular: as per HPI   Respiratory: as per HPI  Gastrointestinal: no nausea and no vomiting. No diarrhea or constipation. No reflux.  Integumentary: no rashes.      Objective   Physical Exam  GENERAL: normal appearance. Well nourished. No respiratory distress  HEAD/SINUSES: no sinus tenderness  OROPHARYNX: Moist mucosa, no thrush or lesions -   NECK: no JVD, midline trachea without stridor  LYMPH NODES: none felt in the cervical, submandibular or supraclavicular regions  LUNGS: Rhonchi in both lung fields posteriorly  CARDIAC: normal S1 and S2; no gallops, rubs or murmurs. Regular rate and rhythm  EXTREMITIES: No edema,  NEURO: grossly normal mental status, CN reflexes and motor strength.   SKIN: Skin turgor normal. No rashes or lesions.   PSYCH: Normal affect     AVAILABLE DATA - this represents my  personal interpretation.   (03/28/24) PFT: Mild obstructive ventilatory defect    Assessment/Plan   Diagnoses and all orders for this visit:  Bronchiectasis with acute lower respiratory infection (Multi)  -     levoFLOXacin (Levaquin) 750 mg tablet; Take 1 tablet (750 mg) by mouth once daily for 14 days. Take 1 pill daily with food.  -     CT chest high resolution; Future  -     Follow Up In Pulmonology; Future  Mycobacterium avium-intracellulare infection (Multi)  -     CT chest high resolution; Future  -     Follow Up In Pulmonology; Future  Thank you for allowing me to participate in your health care today.    The primary respiratory problem that we discussed is bronchiectasis and old MAC infection  There is a concern that there is active infection with pseudomonas  Start Ciprofloxacin 750 mg every 12 hours for 14 days  Lets re-check chest CT to see if there are any new nodules  I will contact Dr. Galicia which to see how she will to discuss further plans  Start using saline nebulizer just prior to the vest therapy after an albuterol treatment  Vest therapy three times a day  Follow-up in this clinic in one month    Scribe Attestation  By signing my name below, IBrandon, Scrophelia   attest that this documentation has been prepared under the direction and in the presence of rCissy Gil MD.

## 2024-07-02 NOTE — PATIENT INSTRUCTIONS
Thank you for allowing me to participate in your health care today.    The primary respiratory problem that we discussed is bronchiectasis and old MAC infection  There is a concern that there is active infection with pseudomonas  Start Ciprofloxacin 750 mg every 12 hours for 14 days  Lets re-check chest CT to see if there are any new nodules  I will contact Dr. Galicia which to see how she will to discuss further plans  Start using saline nebulizer just prior to the vest therapy after an albuterol treatment  Vest therapy three times a day  Follow-up in this clinic in one month    Please call [phone] ([facility]) to get your tests scheduled.     Please call [phone] to schedule a follow up appointment with me.    Unless deemed urgent or emergent, the result(s) of any tests ordering during this clinic visit will be reviewed during your follow-up visit. Depending on the urgency of the result(s), I may call or send you a [employer] message.

## 2024-07-03 ENCOUNTER — APPOINTMENT (OUTPATIENT)
Dept: PRIMARY CARE | Facility: CLINIC | Age: 74
End: 2024-07-03
Payer: MEDICARE

## 2024-07-03 VITALS
HEIGHT: 67 IN | WEIGHT: 120.15 LBS | TEMPERATURE: 98.1 F | HEART RATE: 63 BPM | BODY MASS INDEX: 18.86 KG/M2 | OXYGEN SATURATION: 96 % | SYSTOLIC BLOOD PRESSURE: 84 MMHG | DIASTOLIC BLOOD PRESSURE: 62 MMHG | RESPIRATION RATE: 16 BRPM

## 2024-07-03 DIAGNOSIS — Z12.31 SCREENING MAMMOGRAM FOR BREAST CANCER: ICD-10-CM

## 2024-07-03 DIAGNOSIS — R42 POSTURAL DIZZINESS: ICD-10-CM

## 2024-07-03 DIAGNOSIS — F33.9 DEPRESSION, RECURRENT (CMS-HCC): ICD-10-CM

## 2024-07-03 DIAGNOSIS — Z00.00 WELLNESS EXAMINATION: Primary | ICD-10-CM

## 2024-07-03 DIAGNOSIS — J47.0 BRONCHIECTASIS WITH ACUTE LOWER RESPIRATORY INFECTION (MULTI): ICD-10-CM

## 2024-07-03 LAB
ACID FAST STN SPEC: NORMAL
MYCOBACTERIUM SPEC CULT: NORMAL

## 2024-07-03 PROCEDURE — 1159F MED LIST DOCD IN RCRD: CPT | Performed by: INTERNAL MEDICINE

## 2024-07-03 PROCEDURE — 1126F AMNT PAIN NOTED NONE PRSNT: CPT | Performed by: INTERNAL MEDICINE

## 2024-07-03 PROCEDURE — 1157F ADVNC CARE PLAN IN RCRD: CPT | Performed by: INTERNAL MEDICINE

## 2024-07-03 PROCEDURE — 1160F RVW MEDS BY RX/DR IN RCRD: CPT | Performed by: INTERNAL MEDICINE

## 2024-07-03 PROCEDURE — 1170F FXNL STATUS ASSESSED: CPT | Performed by: INTERNAL MEDICINE

## 2024-07-03 PROCEDURE — G0439 PPPS, SUBSEQ VISIT: HCPCS | Performed by: INTERNAL MEDICINE

## 2024-07-03 PROCEDURE — 99213 OFFICE O/P EST LOW 20 MIN: CPT | Performed by: INTERNAL MEDICINE

## 2024-07-03 PROCEDURE — 1036F TOBACCO NON-USER: CPT | Performed by: INTERNAL MEDICINE

## 2024-07-03 RX ORDER — LEVOFLOXACIN 750 MG/1
750 TABLET ORAL DAILY
Qty: 10 TABLET | Refills: 0 | Status: SHIPPED | OUTPATIENT
Start: 2024-07-03 | End: 2024-07-17

## 2024-07-03 ASSESSMENT — ENCOUNTER SYMPTOMS
DEPRESSION: 0
OCCASIONAL FEELINGS OF UNSTEADINESS: 0
LOSS OF SENSATION IN FEET: 0
SHORTNESS OF BREATH: 0
COUGH: 1

## 2024-07-03 ASSESSMENT — PATIENT HEALTH QUESTIONNAIRE - PHQ9
1. LITTLE INTEREST OR PLEASURE IN DOING THINGS: NOT AT ALL
SUM OF ALL RESPONSES TO PHQ9 QUESTIONS 1 AND 2: 2
10. IF YOU CHECKED OFF ANY PROBLEMS, HOW DIFFICULT HAVE THESE PROBLEMS MADE IT FOR YOU TO DO YOUR WORK, TAKE CARE OF THINGS AT HOME, OR GET ALONG WITH OTHER PEOPLE: NOT DIFFICULT AT ALL
2. FEELING DOWN, DEPRESSED OR HOPELESS: MORE THAN HALF THE DAYS

## 2024-07-03 ASSESSMENT — ACTIVITIES OF DAILY LIVING (ADL)
BATHING: INDEPENDENT
DRESSING: INDEPENDENT
TAKING_MEDICATION: INDEPENDENT
MANAGING_FINANCES: INDEPENDENT
DOING_HOUSEWORK: INDEPENDENT
GROCERY_SHOPPING: INDEPENDENT

## 2024-07-03 ASSESSMENT — PAIN SCALES - GENERAL: PAINLEVEL: 0-NO PAIN

## 2024-07-03 NOTE — PROGRESS NOTES
"Subjective   Reason for Visit: Abad Yan is an 73 y.o. female here for a Medicare Wellness visit. New infection with pseudomona in her lungs, fatigue, insomnia    Past Medical, Surgical, and Family History reviewed and updated in chart.    Reviewed all medications by prescribing practitioner or clinical pharmacist (such as prescriptions, OTCs, herbal therapies and supplements) and documented in the medical record.    HPI She started feeling more tired about 6 weeks ago, with insomnia to maintain her sleep after 4 hours intermitently,however in last week has become every night. She wakes up and able to go back to sleep after 1 or 2 hours. She is also taking naps during the day.  She was diagnosted yesterday with pseudomonas aeruginosa from culture of sputum. She was prescribed but not started on levofloxacine.    Patient Care Team:  Eva De Paz MD as PCP - General (Internal Medicine)  Eva De Paz MD as PCP [facility]ributed [facility]     Review of Systems   Respiratory:  Positive for cough. Negative for shortness of breath.    Cardiovascular:  Negative for chest pain.   Neurological:         Headaches are not present since she change computer desk , lab board for key board,    All other systems reviewed and are negative.      Objective   Vitals:  BP 84/62 (BP Location: Left arm, Patient Position: Sitting, BP Cuff Size: Adult)   Pulse 63   Temp 36.7 °C (98.1 °F)   Resp 16   Ht 1.702 m (5' 7\")   Wt 54.5 kg (120 lb 2.4 oz)   LMP  (LMP Unknown)   SpO2 96%   BMI 18.82 kg/m²       Physical Exam  Eyes- Conjunctiva clear  HEENT no sinus tenderness tms clear  Neck-no thyromegaly  Breasst pendular, no palpable masses no visile discharge  Cardiac- regular rate and rhythm, no murmurs  Lung- few basicalr dry rales   GI- present  bowel sounds, nontender, no rebound  MSK- no deformities  Extremities- no edema, good distal pulses  Neuro- non focal, oriented x 3  Psychiatric- pleasant, well " groomed, no hallucinations    Assessment/Plan   Problem List Items Addressed This Visit       Depression, recurrent (CMS-HCC)    Current Assessment & Plan     Continue current medication, communicate with us in 2 to 3 weeks.         Postural dizziness    Current Assessment & Plan     Discussed hydration         Wellness examination - Primary    Current Assessment & Plan     Discussed diet, exercise, immunizations, and screening appropriate for their age.  Colonoscopy: 2016 next in 2026  Dexa: oct  2023 next in 3 years due to prior fracture  Mammogram: oct 2023.              Other Visit Diagnoses       Screening mammogram for breast cancer        Relevant Orders    BI mammo bilateral screening tomosynthesis    Bronchiectasis with acute lower respiratory infection (Multi)        Relevant Medications    levoFLOXacin (Levaquin) 750 mg tablet

## 2024-07-03 NOTE — ASSESSMENT & PLAN NOTE
Discussed diet, exercise, immunizations, and screening appropriate for their age.  Colonoscopy: 2016 next in 2026  Dexa: oct  2023 next in 3 years due to prior fracture  Mammogram: oct 2023.

## 2024-07-03 NOTE — PATIENT INSTRUCTIONS
Please increase hydration,avoid long fasting periods, start today your antibiotic, sent message to us regarding mood in 2 to 3 week, return in 6 weeeks.

## 2024-07-12 ENCOUNTER — APPOINTMENT (OUTPATIENT)
Dept: AUDIOLOGY | Facility: CLINIC | Age: 74
End: 2024-07-12
Payer: MEDICARE

## 2024-07-17 ENCOUNTER — APPOINTMENT (OUTPATIENT)
Dept: AUDIOLOGY | Facility: CLINIC | Age: 74
End: 2024-07-17
Payer: MEDICARE

## 2024-07-17 LAB
ACID FAST STN SPEC: NORMAL
MYCOBACTERIUM SPEC CULT: NORMAL

## 2024-07-19 ENCOUNTER — APPOINTMENT (OUTPATIENT)
Dept: AUDIOLOGY | Facility: CLINIC | Age: 74
End: 2024-07-19
Payer: MEDICARE

## 2024-07-23 ENCOUNTER — HOSPITAL ENCOUNTER (OUTPATIENT)
Dept: RADIOLOGY | Facility: HOSPITAL | Age: 74
Discharge: HOME | End: 2024-07-23
Payer: MEDICARE

## 2024-07-23 DIAGNOSIS — J47.0 BRONCHIECTASIS WITH ACUTE LOWER RESPIRATORY INFECTION (MULTI): ICD-10-CM

## 2024-07-23 DIAGNOSIS — A31.0 MYCOBACTERIUM AVIUM-INTRACELLULARE INFECTION (MULTI): ICD-10-CM

## 2024-07-23 PROCEDURE — 71250 CT THORAX DX C-: CPT

## 2024-07-23 PROCEDURE — 71250 CT THORAX DX C-: CPT | Performed by: RADIOLOGY

## 2024-07-24 LAB
ACID FAST STN SPEC: NORMAL
MYCOBACTERIUM SPEC CULT: NORMAL

## 2024-07-30 ENCOUNTER — OFFICE VISIT (OUTPATIENT)
Dept: PULMONOLOGY | Facility: HOSPITAL | Age: 74
End: 2024-07-30
Payer: MEDICARE

## 2024-07-30 ENCOUNTER — LAB (OUTPATIENT)
Dept: LAB | Facility: LAB | Age: 74
End: 2024-07-30
Payer: MEDICARE

## 2024-07-30 VITALS
WEIGHT: 120 LBS | BODY MASS INDEX: 18.79 KG/M2 | OXYGEN SATURATION: 96 % | HEART RATE: 82 BPM | DIASTOLIC BLOOD PRESSURE: 67 MMHG | TEMPERATURE: 97.9 F | SYSTOLIC BLOOD PRESSURE: 105 MMHG

## 2024-07-30 DIAGNOSIS — J47.9 BRONCHIECTASIS WITHOUT ACUTE EXACERBATION (MULTI): ICD-10-CM

## 2024-07-30 LAB
ANION GAP SERPL CALC-SCNC: 10 MMOL/L (ref 10–20)
BASOPHILS # BLD AUTO: 0.05 X10*3/UL (ref 0–0.1)
BASOPHILS NFR BLD AUTO: 0.7 %
BUN SERPL-MCNC: 17 MG/DL (ref 6–23)
CALCIUM SERPL-MCNC: 9.3 MG/DL (ref 8.6–10.6)
CHLORIDE SERPL-SCNC: 102 MMOL/L (ref 98–107)
CO2 SERPL-SCNC: 30 MMOL/L (ref 21–32)
CREAT SERPL-MCNC: 0.85 MG/DL (ref 0.5–1.05)
EGFRCR SERPLBLD CKD-EPI 2021: 72 ML/MIN/1.73M*2
EOSINOPHIL # BLD AUTO: 0.08 X10*3/UL (ref 0–0.4)
EOSINOPHIL NFR BLD AUTO: 1.2 %
ERYTHROCYTE [DISTWIDTH] IN BLOOD BY AUTOMATED COUNT: 13.7 % (ref 11.5–14.5)
GLUCOSE SERPL-MCNC: 99 MG/DL (ref 74–99)
HCT VFR BLD AUTO: 40.1 % (ref 36–46)
HGB BLD-MCNC: 12.5 G/DL (ref 12–16)
IMM GRANULOCYTES # BLD AUTO: 0.02 X10*3/UL (ref 0–0.5)
IMM GRANULOCYTES NFR BLD AUTO: 0.3 % (ref 0–0.9)
LYMPHOCYTES # BLD AUTO: 2.02 X10*3/UL (ref 0.8–3)
LYMPHOCYTES NFR BLD AUTO: 29.1 %
MCH RBC QN AUTO: 29.4 PG (ref 26–34)
MCHC RBC AUTO-ENTMCNC: 31.2 G/DL (ref 32–36)
MCV RBC AUTO: 94 FL (ref 80–100)
MONOCYTES # BLD AUTO: 0.62 X10*3/UL (ref 0.05–0.8)
MONOCYTES NFR BLD AUTO: 8.9 %
NEUTROPHILS # BLD AUTO: 4.14 X10*3/UL (ref 1.6–5.5)
NEUTROPHILS NFR BLD AUTO: 59.8 %
NRBC BLD-RTO: 0 /100 WBCS (ref 0–0)
PLATELET # BLD AUTO: 360 X10*3/UL (ref 150–450)
POTASSIUM SERPL-SCNC: 4.3 MMOL/L (ref 3.5–5.3)
RBC # BLD AUTO: 4.25 X10*6/UL (ref 4–5.2)
SODIUM SERPL-SCNC: 138 MMOL/L (ref 136–145)
WBC # BLD AUTO: 6.9 X10*3/UL (ref 4.4–11.3)

## 2024-07-30 PROCEDURE — 99213 OFFICE O/P EST LOW 20 MIN: CPT | Performed by: INTERNAL MEDICINE

## 2024-07-30 PROCEDURE — 1036F TOBACCO NON-USER: CPT | Performed by: INTERNAL MEDICINE

## 2024-07-30 PROCEDURE — 85025 COMPLETE CBC W/AUTO DIFF WBC: CPT

## 2024-07-30 PROCEDURE — 1159F MED LIST DOCD IN RCRD: CPT | Performed by: INTERNAL MEDICINE

## 2024-07-30 PROCEDURE — 1126F AMNT PAIN NOTED NONE PRSNT: CPT | Performed by: INTERNAL MEDICINE

## 2024-07-30 PROCEDURE — 80048 BASIC METABOLIC PNL TOTAL CA: CPT

## 2024-07-30 PROCEDURE — 1157F ADVNC CARE PLAN IN RCRD: CPT | Performed by: INTERNAL MEDICINE

## 2024-07-30 PROCEDURE — 36415 COLL VENOUS BLD VENIPUNCTURE: CPT

## 2024-07-30 ASSESSMENT — PAIN SCALES - GENERAL: PAINLEVEL: 0-NO PAIN

## 2024-07-30 ASSESSMENT — LIFESTYLE VARIABLES: HOW OFTEN DO YOU HAVE A DRINK CONTAINING ALCOHOL: NEVER

## 2024-07-30 NOTE — H&P (VIEW-ONLY)
Subjective   Patient ID: Abad Yan is a 73 y.o. female who presents for Pt. here today for FUV    HPI  Last visit in this clinic was 04/02/24.    Abad Yan is a 73 y.o. female patient with a history of MAC, CVID on every 3 week IVIG, chronic bronchiectasis, and recurrent sinopulmonary infections who presents today to review recent CT imaging. The patient is also closely followed by Dr. Galicia for treatment.  Pseudonormal was cultured from sputum on 2 occasions.  She was treated with a course of levofloxacin followed by a course of ciprofloxacin.  There was only temporary improvement in cough and sputum production.  Currently she has a cough that persists with very difficult ability to expectorate.  She is very fatigued to the point that she tends to sleep all day.  No shortness of breath, however, her activity has declined.  She has a poor appetite and has been capitalizing on drinking supplements to maintain weight.  CT of the chest reviewed with patient in the room showing marked worsening of tree-in-bud abnormalities in the right upper lobe as well as increased infiltrate around the pre-existing cavitary lesion in the right upper lobe    Review of Systems  Review of Pertinent Systems:  Constitutional: no chills, no fever,   fatigue is significant  Eyes: no blurred vision and no dryness of the eyes.   ENT: no nasal congestion, no hoarseness, no sore throat, no postnasal drip.  Cardiovascular: as per HPI   Respiratory: as per HPI  Gastrointestinal: no nausea and no vomiting. No diarrhea or constipation. No reflux.  Integumentary: no rashes.      Objective   Physical Exam  GENERAL: Normal appearance. Well nourished. No respiratory distress. Frequent coughing   HEAD/SINUSES: No sinus tenderness.   OROPHARYNX: Moist mucosa, no thrush or lesions -   NECK: No JVD, midline trachea without stridor.   LYMPH NODES: None felt in the cervical, submandibular, or supraclavicular regions.   LUNGS:  Clear lung fields anteriorly and posteriorly except for few rhonchi  CARDIAC: Normal S1 and S2; no gallops, rubs or murmurs. Regular rate and rhythm.   ABDOMEN: Abdomen soft, non-tender. Non-distended. BS normal.   EXTREMITIES: No edema.   NEURO: Grossly normal mental status, CNs, reflexes, and motor strength.   SKIN: Skin turgor normal. No rashes or lesions.    PSYCH: Normal affect.       AVAILABLE DATA - this represents my personal interpretation.   CT Chest 07/23/24  IMPRESSION:  1. Redemonstration right middle lobe and lingula multifocal mucous plugging, tree-in-bud nodularity and bronchiectasis/bronchiolectasis.  Significant interval increase in right upper lobe tree-in-bud nodularity and bronchial wall thickening. Findings consistent worsening infection. Mycobacterium infection should be a consideration. Mucous that she had a this some some  2. Interval increase in size of right upper lobe cavitary,  thick-walled pulmonary nodule with adjacent pleural thickening and  satellite nodules. This may also represent worsening mycobacterium  infection, although enlarging cavitary malignancy is not excluded.        Assessment/Plan      Bronchiectasis without acute exacerbation (Multi)  An on going infection is possible. Not sure if pseudomonas is cleared or not. With the progression of changes on chest CT bronchoscopy is indicated. Risks and benefits reviewed. Dr Tran contacted and is in agreement   -   There is a concern that there is active infection with pseudomonas  chest CT is much worse in the right upper lobe - bronchoscopy recommended -     contacted Dr. Galicia; she will to discuss further plans of IV therapy  Start using 3% saline nebulizer just prior to the vest therapy after an albuterol treatment three time a day  Vest therapy three times a day  Follow-up in this clinic in one month    Scribe Attestation  By signing my name below, I, Brandon Sd, Scribe   attest that this documentation has been  prepared under the direction and in the presence of Crissy Gil MD.

## 2024-07-30 NOTE — PATIENT INSTRUCTIONS
Thank you for allowing me to participate in your health care today.    The primary respiratory problem that we discussed is bronchiectasis and old MAC infection  There is a concern that there is active infection with pseudomonas  chest CT is much worse in the right upper lobe - bronchoscopy recommended - arranging for it to happen tomorrow  I will contact Dr. Galicia which to see how she will to discuss further plans of IV therapy  Start using 3% saline nebulizer just prior to the vest therapy after an albuterol treatment  Vest therapy three times a day  Follow-up in this clinic in one month    Please call [phone] ([facility]) to get your tests scheduled.     Please call [phone] to schedule a follow up appointment with me.    Unless deemed urgent or emergent, the result(s) of any tests ordering during this clinic visit will be reviewed during your follow-up visit. Depending on the urgency of the result(s), I may call or send you a [employer] message.

## 2024-07-31 ENCOUNTER — ANESTHESIA EVENT (OUTPATIENT)
Dept: GASTROENTEROLOGY | Facility: HOSPITAL | Age: 74
End: 2024-07-31
Payer: MEDICARE

## 2024-07-31 ENCOUNTER — ANESTHESIA (OUTPATIENT)
Dept: GASTROENTEROLOGY | Facility: HOSPITAL | Age: 74
End: 2024-07-31
Payer: MEDICARE

## 2024-07-31 ENCOUNTER — HOSPITAL ENCOUNTER (OUTPATIENT)
Dept: GASTROENTEROLOGY | Facility: HOSPITAL | Age: 74
Discharge: HOME | End: 2024-07-31
Payer: MEDICARE

## 2024-07-31 VITALS
SYSTOLIC BLOOD PRESSURE: 114 MMHG | RESPIRATION RATE: 20 BRPM | WEIGHT: 123.02 LBS | BODY MASS INDEX: 19.31 KG/M2 | DIASTOLIC BLOOD PRESSURE: 63 MMHG | OXYGEN SATURATION: 96 % | HEIGHT: 67 IN | TEMPERATURE: 97.9 F | HEART RATE: 65 BPM

## 2024-07-31 DIAGNOSIS — R91.8 LUNG INFILTRATE: ICD-10-CM

## 2024-07-31 LAB
ACID FAST STN SPEC: NORMAL
MYCOBACTERIUM SPEC CULT: NORMAL

## 2024-07-31 PROCEDURE — 3700000001 HC GENERAL ANESTHESIA TIME - INITIAL BASE CHARGE

## 2024-07-31 PROCEDURE — 7100000010 HC PHASE TWO TIME - EACH INCREMENTAL 1 MINUTE

## 2024-07-31 PROCEDURE — 2500000005 HC RX 250 GENERAL PHARMACY W/O HCPCS: Performed by: INTERNAL MEDICINE

## 2024-07-31 PROCEDURE — 87116 MYCOBACTERIA CULTURE: CPT | Mod: AHULAB | Performed by: INTERNAL MEDICINE

## 2024-07-31 PROCEDURE — 2500000004 HC RX 250 GENERAL PHARMACY W/ HCPCS (ALT 636 FOR OP/ED): Performed by: ANESTHESIOLOGIST ASSISTANT

## 2024-07-31 PROCEDURE — 99100 ANES PT EXTEME AGE<1 YR&>70: CPT | Performed by: ANESTHESIOLOGY

## 2024-07-31 PROCEDURE — 87186 SC STD MICRODIL/AGAR DIL: CPT | Mod: AHULAB | Performed by: INTERNAL MEDICINE

## 2024-07-31 PROCEDURE — 31624 DX BRONCHOSCOPE/LAVAGE: CPT | Performed by: INTERNAL MEDICINE

## 2024-07-31 PROCEDURE — 3700000002 HC GENERAL ANESTHESIA TIME - EACH INCREMENTAL 1 MINUTE

## 2024-07-31 PROCEDURE — 87102 FUNGUS ISOLATION CULTURE: CPT | Mod: AHULAB | Performed by: INTERNAL MEDICINE

## 2024-07-31 PROCEDURE — 7100000009 HC PHASE TWO TIME - INITIAL BASE CHARGE

## 2024-07-31 PROCEDURE — A31624 PR BRONCHOSCOPY,DIAGNOSTIC W LAVAGE: Performed by: ANESTHESIOLOGY

## 2024-07-31 PROCEDURE — A31624 PR BRONCHOSCOPY,DIAGNOSTIC W LAVAGE: Performed by: ANESTHESIOLOGIST ASSISTANT

## 2024-07-31 RX ORDER — MIDAZOLAM HYDROCHLORIDE 1 MG/ML
INJECTION INTRAMUSCULAR; INTRAVENOUS AS NEEDED
Status: DISCONTINUED | OUTPATIENT
Start: 2024-07-31 | End: 2024-07-31

## 2024-07-31 RX ORDER — OXYCODONE HYDROCHLORIDE 5 MG/1
5 TABLET ORAL EVERY 4 HOURS PRN
Status: DISCONTINUED | OUTPATIENT
Start: 2024-07-31 | End: 2024-08-01 | Stop reason: HOSPADM

## 2024-07-31 RX ORDER — PROPOFOL 10 MG/ML
INJECTION, EMULSION INTRAVENOUS AS NEEDED
Status: DISCONTINUED | OUTPATIENT
Start: 2024-07-31 | End: 2024-07-31

## 2024-07-31 RX ORDER — SODIUM CHLORIDE, SODIUM LACTATE, POTASSIUM CHLORIDE, CALCIUM CHLORIDE 600; 310; 30; 20 MG/100ML; MG/100ML; MG/100ML; MG/100ML
100 INJECTION, SOLUTION INTRAVENOUS CONTINUOUS
Status: DISCONTINUED | OUTPATIENT
Start: 2024-07-31 | End: 2024-08-01 | Stop reason: HOSPADM

## 2024-07-31 RX ORDER — LIDOCAINE HYDROCHLORIDE 20 MG/ML
JELLY TOPICAL AS NEEDED
Status: COMPLETED | OUTPATIENT
Start: 2024-07-31 | End: 2024-07-31

## 2024-07-31 RX ORDER — LIDOCAINE HYDROCHLORIDE 10 MG/ML
0.1 INJECTION, SOLUTION EPIDURAL; INFILTRATION; INTRACAUDAL; PERINEURAL ONCE
Status: DISCONTINUED | OUTPATIENT
Start: 2024-07-31 | End: 2024-08-01 | Stop reason: HOSPADM

## 2024-07-31 RX ORDER — SODIUM CHLORIDE, SODIUM LACTATE, POTASSIUM CHLORIDE, CALCIUM CHLORIDE 600; 310; 30; 20 MG/100ML; MG/100ML; MG/100ML; MG/100ML
INJECTION, SOLUTION INTRAVENOUS CONTINUOUS PRN
Status: DISCONTINUED | OUTPATIENT
Start: 2024-07-31 | End: 2024-07-31

## 2024-07-31 RX ORDER — LIDOCAINE HYDROCHLORIDE 20 MG/ML
INJECTION, SOLUTION EPIDURAL; INFILTRATION; INTRACAUDAL; PERINEURAL AS NEEDED
Status: COMPLETED | OUTPATIENT
Start: 2024-07-31 | End: 2024-07-31

## 2024-07-31 RX ORDER — ONDANSETRON HYDROCHLORIDE 2 MG/ML
4 INJECTION, SOLUTION INTRAVENOUS ONCE AS NEEDED
Status: DISCONTINUED | OUTPATIENT
Start: 2024-07-31 | End: 2024-08-01 | Stop reason: HOSPADM

## 2024-07-31 SDOH — HEALTH STABILITY: MENTAL HEALTH: CURRENT SMOKER: 0

## 2024-07-31 ASSESSMENT — PAIN SCALES - GENERAL
PAIN_LEVEL: 4
PAINLEVEL_OUTOF10: 0 - NO PAIN

## 2024-07-31 ASSESSMENT — PAIN - FUNCTIONAL ASSESSMENT
PAIN_FUNCTIONAL_ASSESSMENT: 0-10

## 2024-07-31 NOTE — ANESTHESIA POSTPROCEDURE EVALUATION
Patient: Abad Yan    Procedure Summary       Date: 07/31/24 Room / Location: [facility]    Anesthesia Start: 1225 Anesthesia Stop: 1258    Procedure: BRONCHOSCOPY Diagnosis: Lung infiltrate    Scheduled Providers: Crissy Gil MD; Sachin Sethi MD; KIMBERLEY Blair; Heidi Aguilar RN Responsible Provider: Sachni Sethi MD    Anesthesia Type: MAC ASA Status: 3            Anesthesia Type: MAC    Vitals Value Taken Time   BP 93/70 07/31/24 1254   Temp 36.6 °C (97.9 °F) 07/31/24 1249   Pulse 89 07/31/24 1254   Resp 15 07/31/24 1254   SpO2 97 % 07/31/24 1254       Anesthesia Post Evaluation    Patient location during evaluation: PACU  Patient participation: complete - patient participated  Level of consciousness: awake and alert  Pain score: 4  Pain management: adequate  Airway patency: patent  Cardiovascular status: acceptable  Respiratory status: acceptable  Hydration status: acceptable  Postoperative Nausea and Vomiting: none    No notable events documented.

## 2024-07-31 NOTE — ANESTHESIA PREPROCEDURE EVALUATION
[facility]    Procedure Information       Anesthesia Start Date/Time: 07/31/24 1225    Scheduled providers: Crissy Gil MD; Sachin Sethi MD; KIMBERLEY Blair; Heidi Aguilar RN    Procedure: BRONCHOSCOPY    Location: [facility]            Relevant Problems   Neuro   (+) Chronic headache   (+) Depression, recurrent (CMS-HCC)   (+) Sciatic radiculitis   (+) Tarlov cysts      GI   (+) GERD (gastroesophageal reflux disease)      /Renal   (+) Pyelonephritis   (+) Recurrent UTI      Endocrine   (+) Multinodular goiter   (+) Primary hypothyroidism      Musculoskeletal   (+) Chronic bilateral low back pain with left-sided sciatica      HEENT   (+) Acute recurrent sinusitis   (+) SNHL (sensorineural hearing loss)      ID   (+) Mycobacterium avium-intracellulare infection (Multi)   (+) Pyelonephritis   (+) Recurrent UTI   (+) Visceral larva migrans syndrome       Clinical information reviewed:   Tobacco  Allergies  Meds   Med Hx  Surg Hx  OB Status  Fam Hx  Soc   Hx        NPO Detail:  NPO/Void Status  Carbohydrate Drink Given Prior to Surgery? : N  Date of Last Liquid: 07/31/24  Time of Last Liquid: 0800  Date of Last Solid: 07/30/24  Time of Last Solid: 2330  Last Intake Type: Clear fluids (water)  Time of Last Void: 1030         Physical Exam    Airway  Mallampati: I  TM distance: >3 FB  Neck ROM: full     Cardiovascular - normal exam     Dental - normal exam     Pulmonary - normal exam     Abdominal - normal exam         Anesthesia Plan    History of general anesthesia?: yes  History of complications of general anesthesia?: no    ASA 3     MAC     The patient is not a current smoker.  Patient was not previously instructed to abstain from smoking on day of procedure.  Patient did not smoke on day of procedure.    intravenous induction   Postoperative administration of opioids is intended.  Anesthetic plan and risks discussed with patient.  Use of blood products discussed  with patient who.    Plan discussed with [facility].

## 2024-07-31 NOTE — DISCHARGE INSTRUCTIONS
The anesthetics, sedatives or narcotics which were given to you today will be acting in your body for the next 24 hours, so you might feel a little sleepy or groggy. This feeling should slowly wear off.   Carefully read and follow the instructions below:   You received sedation today.   Do not drive or operate machinery or power tools of any kind.   No alcoholic beverages today, not even beer or wine.   No over the counter medications that contain alcohol or may cause drowsiness.   Do not make important decisions or sign legal documents.     Do not use Aspirin containing products or non-steroidal medications for the next 24 hours.  (Examples of these types of medications include: Advil, Aleve, Ecotrin, Ibuprofen, Motrin or Naprosyn.  This list is not all-inclusive.  Check with your physician or pharmacist before resuming these medications.)  Tylenol, cough medicine, cough drops or throat lozenges may be used when you are allowed to resume eating and drinking.     Call your physician if any of these symptoms occur:   High fever over 101 degrees or chills (a low grade fever is common for 24 hours)   Rash or hives   Persistent nausea or vomiting   Inability to urinate within 8 hours after the procedure  Go directly to the emergency room if you notice any of the following:   Shortness of breath   Chest pain  Coughing up large amounts of bright red blood greater than a teaspoonful of blood clots (about a teaspoonful for the next 24-48 hours is normal, especially if you had a biopsy)  Resume all normal medications unless directed otherwise by your doctor.     Follow up with your referring physician as previously scheduled.    If you experience any problems or have any questions following discharge, please call:   Before 5 pm: [phone]   After 5pm and on weekends: [phone] / [phone] and ask for the Pulmonary Fellow on-call (Pager Number: [phone])

## 2024-07-31 NOTE — PERIOPERATIVE NURSING NOTE
1249- PHASE II - Patient to Endoscopy bay at this time in stable condition. Beside monitors applied to patient upon arrival. Report received from GI team at bedside     1257- Dr. Gil at bedside speaking with patient at this time     1308- Patient tolerating sips of water at this time. Denies nausea.    1334- Criteria met for patient to discharge from Phase II at this time    1340-Home going instructions went over with patient and patient's family member. Educated on when to follow up with provider. All questions answered and patient and patient's family member verified understanding verbally.    1343- Messaged Dr. Gil to see if she wanted to come back to speak with patient and patient's  now that she is more awake and  is at bedside     1345- Dr. Gil responded that she will call patient later today. Patient and patient's  notified.     1354- IV removed at this time with IV catheter tip intact upon removal    1403- Patient transported to [facility] at this time in stable condition and with all belongings via wheelchair.

## 2024-08-01 ASSESSMENT — PAIN SCALES - GENERAL: PAINLEVEL_OUTOF10: 0 - NO PAIN

## 2024-08-02 LAB
ACID FAST STN SPEC: NORMAL
ACID FAST STN SPEC: NORMAL
FUNGUS SPEC CULT: NORMAL
FUNGUS SPEC CULT: NORMAL
FUNGUS SPEC FUNGUS STN: NORMAL
FUNGUS SPEC FUNGUS STN: NORMAL
MYCOBACTERIUM SPEC CULT: NORMAL
MYCOBACTERIUM SPEC CULT: NORMAL

## 2024-08-03 ENCOUNTER — APPOINTMENT (OUTPATIENT)
Dept: RADIOLOGY | Facility: HOSPITAL | Age: 74
DRG: 315 | End: 2024-08-03
Payer: MEDICARE

## 2024-08-03 ENCOUNTER — CLINICAL SUPPORT (OUTPATIENT)
Dept: EMERGENCY MEDICINE | Facility: HOSPITAL | Age: 74
DRG: 315 | End: 2024-08-03
Payer: MEDICARE

## 2024-08-03 ENCOUNTER — HOSPITAL ENCOUNTER (INPATIENT)
Facility: HOSPITAL | Age: 74
LOS: 2 days | Discharge: HOME | DRG: 315 | End: 2024-08-05
Attending: EMERGENCY MEDICINE | Admitting: INTERNAL MEDICINE
Payer: MEDICARE

## 2024-08-03 DIAGNOSIS — Z22.39: ICD-10-CM

## 2024-08-03 DIAGNOSIS — I82.A12 ACUTE DEEP VEIN THROMBOSIS (DVT) OF AXILLARY VEIN OF LEFT UPPER EXTREMITY (MULTI): Primary | ICD-10-CM

## 2024-08-03 DIAGNOSIS — G89.29 CHRONIC PAIN IN LEFT FOOT: ICD-10-CM

## 2024-08-03 DIAGNOSIS — J47.9 BRONCHIECTASIS WITHOUT ACUTE EXACERBATION (MULTI): ICD-10-CM

## 2024-08-03 DIAGNOSIS — G89.29 CHRONIC LEFT SHOULDER PAIN: ICD-10-CM

## 2024-08-03 DIAGNOSIS — M79.672 CHRONIC PAIN IN LEFT FOOT: ICD-10-CM

## 2024-08-03 DIAGNOSIS — M25.512 CHRONIC LEFT SHOULDER PAIN: ICD-10-CM

## 2024-08-03 LAB
ALBUMIN SERPL BCP-MCNC: 3.3 G/DL (ref 3.4–5)
ALBUMIN SERPL BCP-MCNC: 3.5 G/DL (ref 3.4–5)
ALP SERPL-CCNC: 59 U/L (ref 33–136)
ALT SERPL W P-5'-P-CCNC: 8 U/L (ref 7–45)
ANION GAP SERPL CALC-SCNC: 12 MMOL/L (ref 10–20)
ANION GAP SERPL CALC-SCNC: 17 MMOL/L (ref 10–20)
APTT PPP: 28 SECONDS (ref 27–38)
AST SERPL W P-5'-P-CCNC: 15 U/L (ref 9–39)
BASOPHILS # BLD AUTO: 0.04 X10*3/UL (ref 0–0.1)
BASOPHILS # BLD AUTO: 0.04 X10*3/UL (ref 0–0.1)
BASOPHILS NFR BLD AUTO: 0.4 %
BASOPHILS NFR BLD AUTO: 0.5 %
BILIRUB SERPL-MCNC: 0.3 MG/DL (ref 0–1.2)
BNP SERPL-MCNC: 105 PG/ML (ref 0–99)
BUN SERPL-MCNC: 17 MG/DL (ref 6–23)
BUN SERPL-MCNC: 20 MG/DL (ref 6–23)
CALCIUM SERPL-MCNC: 9.4 MG/DL (ref 8.6–10.6)
CALCIUM SERPL-MCNC: 9.7 MG/DL (ref 8.6–10.6)
CARDIAC TROPONIN I PNL SERPL HS: 26 NG/L (ref 0–34)
CARDIAC TROPONIN I PNL SERPL HS: 28 NG/L (ref 0–34)
CHLORIDE SERPL-SCNC: 102 MMOL/L (ref 98–107)
CHLORIDE SERPL-SCNC: 106 MMOL/L (ref 98–107)
CO2 SERPL-SCNC: 23 MMOL/L (ref 21–32)
CO2 SERPL-SCNC: 24 MMOL/L (ref 21–32)
CREAT SERPL-MCNC: 1.24 MG/DL (ref 0.5–1.05)
CREAT SERPL-MCNC: 1.36 MG/DL (ref 0.5–1.05)
EGFRCR SERPLBLD CKD-EPI 2021: 41 ML/MIN/1.73M*2
EGFRCR SERPLBLD CKD-EPI 2021: 45 ML/MIN/1.73M*2
EOSINOPHIL # BLD AUTO: 0.13 X10*3/UL (ref 0–0.4)
EOSINOPHIL # BLD AUTO: 0.17 X10*3/UL (ref 0–0.4)
EOSINOPHIL NFR BLD AUTO: 1.3 %
EOSINOPHIL NFR BLD AUTO: 2 %
ERYTHROCYTE [DISTWIDTH] IN BLOOD BY AUTOMATED COUNT: 13.4 % (ref 11.5–14.5)
ERYTHROCYTE [DISTWIDTH] IN BLOOD BY AUTOMATED COUNT: 13.7 % (ref 11.5–14.5)
GLUCOSE SERPL-MCNC: 103 MG/DL (ref 74–99)
GLUCOSE SERPL-MCNC: 132 MG/DL (ref 74–99)
HCT VFR BLD AUTO: 34.6 % (ref 36–46)
HCT VFR BLD AUTO: 38.2 % (ref 36–46)
HGB BLD-MCNC: 11.2 G/DL (ref 12–16)
HGB BLD-MCNC: 12.2 G/DL (ref 12–16)
IMM GRANULOCYTES # BLD AUTO: 0.03 X10*3/UL (ref 0–0.5)
IMM GRANULOCYTES # BLD AUTO: 0.03 X10*3/UL (ref 0–0.5)
IMM GRANULOCYTES NFR BLD AUTO: 0.3 % (ref 0–0.9)
IMM GRANULOCYTES NFR BLD AUTO: 0.4 % (ref 0–0.9)
INR PPP: 1.1 (ref 0.9–1.1)
LYMPHOCYTES # BLD AUTO: 1.11 X10*3/UL (ref 0.8–3)
LYMPHOCYTES # BLD AUTO: 2.02 X10*3/UL (ref 0.8–3)
LYMPHOCYTES NFR BLD AUTO: 10.8 %
LYMPHOCYTES NFR BLD AUTO: 23.6 %
MAGNESIUM SERPL-MCNC: 1.93 MG/DL (ref 1.6–2.4)
MCH RBC QN AUTO: 29.8 PG (ref 26–34)
MCH RBC QN AUTO: 29.8 PG (ref 26–34)
MCHC RBC AUTO-ENTMCNC: 31.9 G/DL (ref 32–36)
MCHC RBC AUTO-ENTMCNC: 32.4 G/DL (ref 32–36)
MCV RBC AUTO: 92 FL (ref 80–100)
MCV RBC AUTO: 93 FL (ref 80–100)
MONOCYTES # BLD AUTO: 0.78 X10*3/UL (ref 0.05–0.8)
MONOCYTES # BLD AUTO: 0.96 X10*3/UL (ref 0.05–0.8)
MONOCYTES NFR BLD AUTO: 9.1 %
MONOCYTES NFR BLD AUTO: 9.4 %
NEUTROPHILS # BLD AUTO: 5.51 X10*3/UL (ref 1.6–5.5)
NEUTROPHILS # BLD AUTO: 7.98 X10*3/UL (ref 1.6–5.5)
NEUTROPHILS NFR BLD AUTO: 64.4 %
NEUTROPHILS NFR BLD AUTO: 77.8 %
NRBC BLD-RTO: 0 /100 WBCS (ref 0–0)
NRBC BLD-RTO: 0 /100 WBCS (ref 0–0)
PHOSPHATE SERPL-MCNC: 3.8 MG/DL (ref 2.5–4.9)
PLATELET # BLD AUTO: 287 X10*3/UL (ref 150–450)
PLATELET # BLD AUTO: 305 X10*3/UL (ref 150–450)
POTASSIUM SERPL-SCNC: 3.9 MMOL/L (ref 3.5–5.3)
POTASSIUM SERPL-SCNC: 4.1 MMOL/L (ref 3.5–5.3)
PROT SERPL-MCNC: 6.8 G/DL (ref 6.4–8.2)
PROTHROMBIN TIME: 12.2 SECONDS (ref 9.8–12.8)
RBC # BLD AUTO: 3.76 X10*6/UL (ref 4–5.2)
RBC # BLD AUTO: 4.09 X10*6/UL (ref 4–5.2)
SODIUM SERPL-SCNC: 137 MMOL/L (ref 136–145)
SODIUM SERPL-SCNC: 139 MMOL/L (ref 136–145)
UFH PPP CHRO-ACNC: 0.6 IU/ML
WBC # BLD AUTO: 10.3 X10*3/UL (ref 4.4–11.3)
WBC # BLD AUTO: 8.6 X10*3/UL (ref 4.4–11.3)

## 2024-08-03 PROCEDURE — 85610 PROTHROMBIN TIME: CPT

## 2024-08-03 PROCEDURE — 85025 COMPLETE CBC W/AUTO DIFF WBC: CPT | Performed by: EMERGENCY MEDICINE

## 2024-08-03 PROCEDURE — 84484 ASSAY OF TROPONIN QUANT: CPT | Performed by: EMERGENCY MEDICINE

## 2024-08-03 PROCEDURE — 93010 ELECTROCARDIOGRAM REPORT: CPT | Performed by: EMERGENCY MEDICINE

## 2024-08-03 PROCEDURE — 99285 EMERGENCY DEPT VISIT HI MDM: CPT | Performed by: EMERGENCY MEDICINE

## 2024-08-03 PROCEDURE — 93971 EXTREMITY STUDY: CPT | Performed by: STUDENT IN AN ORGANIZED HEALTH CARE EDUCATION/TRAINING PROGRAM

## 2024-08-03 PROCEDURE — 96376 TX/PRO/DX INJ SAME DRUG ADON: CPT

## 2024-08-03 PROCEDURE — 36415 COLL VENOUS BLD VENIPUNCTURE: CPT | Performed by: EMERGENCY MEDICINE

## 2024-08-03 PROCEDURE — 96375 TX/PRO/DX INJ NEW DRUG ADDON: CPT

## 2024-08-03 PROCEDURE — 71275 CT ANGIOGRAPHY CHEST: CPT | Performed by: STUDENT IN AN ORGANIZED HEALTH CARE EDUCATION/TRAINING PROGRAM

## 2024-08-03 PROCEDURE — 2500000001 HC RX 250 WO HCPCS SELF ADMINISTERED DRUGS (ALT 637 FOR MEDICARE OP)

## 2024-08-03 PROCEDURE — 2550000001 HC RX 255 CONTRASTS: Performed by: EMERGENCY MEDICINE

## 2024-08-03 PROCEDURE — 83735 ASSAY OF MAGNESIUM: CPT

## 2024-08-03 PROCEDURE — 80053 COMPREHEN METABOLIC PANEL: CPT | Performed by: EMERGENCY MEDICINE

## 2024-08-03 PROCEDURE — 93005 ELECTROCARDIOGRAM TRACING: CPT

## 2024-08-03 PROCEDURE — 2500000004 HC RX 250 GENERAL PHARMACY W/ HCPCS (ALT 636 FOR OP/ED)

## 2024-08-03 PROCEDURE — 1210000001 HC SEMI-PRIVATE ROOM DAILY

## 2024-08-03 PROCEDURE — 84484 ASSAY OF TROPONIN QUANT: CPT

## 2024-08-03 PROCEDURE — 71046 X-RAY EXAM CHEST 2 VIEWS: CPT | Performed by: RADIOLOGY

## 2024-08-03 PROCEDURE — 99285 EMERGENCY DEPT VISIT HI MDM: CPT | Mod: 25

## 2024-08-03 PROCEDURE — 85520 HEPARIN ASSAY: CPT

## 2024-08-03 PROCEDURE — 36415 COLL VENOUS BLD VENIPUNCTURE: CPT

## 2024-08-03 PROCEDURE — 71046 X-RAY EXAM CHEST 2 VIEWS: CPT

## 2024-08-03 PROCEDURE — 2500000004 HC RX 250 GENERAL PHARMACY W/ HCPCS (ALT 636 FOR OP/ED): Mod: JZ,JG

## 2024-08-03 PROCEDURE — 71275 CT ANGIOGRAPHY CHEST: CPT

## 2024-08-03 PROCEDURE — 2500000002 HC RX 250 W HCPCS SELF ADMINISTERED DRUGS (ALT 637 FOR MEDICARE OP, ALT 636 FOR OP/ED)

## 2024-08-03 PROCEDURE — 85025 COMPLETE CBC W/AUTO DIFF WBC: CPT

## 2024-08-03 PROCEDURE — 80069 RENAL FUNCTION PANEL: CPT | Mod: CCI

## 2024-08-03 PROCEDURE — 83880 ASSAY OF NATRIURETIC PEPTIDE: CPT

## 2024-08-03 PROCEDURE — 93971 EXTREMITY STUDY: CPT

## 2024-08-03 PROCEDURE — 96365 THER/PROPH/DIAG IV INF INIT: CPT

## 2024-08-03 RX ORDER — FAMOTIDINE 20 MG/1
20 TABLET, FILM COATED ORAL DAILY
Status: DISCONTINUED | OUTPATIENT
Start: 2024-08-03 | End: 2024-08-05 | Stop reason: HOSPADM

## 2024-08-03 RX ORDER — LEVOTHYROXINE SODIUM 75 UG/1
75 TABLET ORAL
Status: DISCONTINUED | OUTPATIENT
Start: 2024-08-04 | End: 2024-08-05 | Stop reason: HOSPADM

## 2024-08-03 RX ORDER — BUDESONIDE 0.5 MG/2ML
0.5 INHALANT ORAL
Status: DISCONTINUED | OUTPATIENT
Start: 2024-08-03 | End: 2024-08-05 | Stop reason: HOSPADM

## 2024-08-03 RX ORDER — SODIUM CHLORIDE, SODIUM LACTATE, POTASSIUM CHLORIDE, CALCIUM CHLORIDE 600; 310; 30; 20 MG/100ML; MG/100ML; MG/100ML; MG/100ML
500 INJECTION, SOLUTION INTRAVENOUS CONTINUOUS
Status: DISCONTINUED | OUTPATIENT
Start: 2024-08-03 | End: 2024-08-03

## 2024-08-03 RX ORDER — POLYETHYLENE GLYCOL 3350 17 G/17G
17 POWDER, FOR SOLUTION ORAL DAILY PRN
Status: DISCONTINUED | OUTPATIENT
Start: 2024-08-03 | End: 2024-08-05 | Stop reason: HOSPADM

## 2024-08-03 RX ORDER — FOLIC ACID 1 MG/1
1 TABLET ORAL DAILY
Status: DISCONTINUED | OUTPATIENT
Start: 2024-08-03 | End: 2024-08-05 | Stop reason: HOSPADM

## 2024-08-03 RX ORDER — HEPARIN SODIUM 10000 [USP'U]/100ML
0-4500 INJECTION, SOLUTION INTRAVENOUS CONTINUOUS
Status: DISCONTINUED | OUTPATIENT
Start: 2024-08-03 | End: 2024-08-04

## 2024-08-03 RX ORDER — CEFEPIME 1 G/50ML
2 INJECTION, SOLUTION INTRAVENOUS EVERY 12 HOURS
Status: DISCONTINUED | OUTPATIENT
Start: 2024-08-03 | End: 2024-08-05 | Stop reason: HOSPADM

## 2024-08-03 RX ORDER — ALBUTEROL SULFATE 0.83 MG/ML
2.5 SOLUTION RESPIRATORY (INHALATION)
Status: DISCONTINUED | OUTPATIENT
Start: 2024-08-03 | End: 2024-08-05 | Stop reason: HOSPADM

## 2024-08-03 RX ORDER — ESCITALOPRAM OXALATE 5 MG/1
5 TABLET ORAL DAILY
Status: DISCONTINUED | OUTPATIENT
Start: 2024-08-03 | End: 2024-08-05 | Stop reason: HOSPADM

## 2024-08-03 RX ORDER — ROSUVASTATIN CALCIUM 20 MG/1
10 TABLET, COATED ORAL NIGHTLY
Status: DISCONTINUED | OUTPATIENT
Start: 2024-08-03 | End: 2024-08-05 | Stop reason: HOSPADM

## 2024-08-03 RX ORDER — SODIUM CHLORIDE FOR INHALATION 3 %
3 VIAL, NEBULIZER (ML) INHALATION 3 TIMES DAILY
Status: DISCONTINUED | OUTPATIENT
Start: 2024-08-03 | End: 2024-08-05 | Stop reason: HOSPADM

## 2024-08-03 RX ORDER — ROPINIROLE 4 MG/1
4 TABLET, FILM COATED ORAL NIGHTLY
Status: DISCONTINUED | OUTPATIENT
Start: 2024-08-03 | End: 2024-08-05 | Stop reason: HOSPADM

## 2024-08-03 RX ORDER — ALBUTEROL SULFATE 90 UG/1
2 INHALANT RESPIRATORY (INHALATION) 2 TIMES DAILY
Status: DISCONTINUED | OUTPATIENT
Start: 2024-08-03 | End: 2024-08-03 | Stop reason: SDUPTHER

## 2024-08-03 SDOH — SOCIAL STABILITY: SOCIAL INSECURITY: ARE THERE ANY APPARENT SIGNS OF INJURIES/BEHAVIORS THAT COULD BE RELATED TO ABUSE/NEGLECT?: NO

## 2024-08-03 SDOH — SOCIAL STABILITY: SOCIAL INSECURITY: HAS ANYONE EVER THREATENED TO HURT YOUR FAMILY OR YOUR PETS?: NO

## 2024-08-03 SDOH — SOCIAL STABILITY: SOCIAL INSECURITY: DOES ANYONE TRY TO KEEP YOU FROM HAVING/CONTACTING OTHER FRIENDS OR DOING THINGS OUTSIDE YOUR HOME?: NO

## 2024-08-03 SDOH — SOCIAL STABILITY: SOCIAL INSECURITY: ARE YOU OR HAVE YOU BEEN THREATENED OR ABUSED PHYSICALLY, EMOTIONALLY, OR SEXUALLY BY ANYONE?: NO

## 2024-08-03 SDOH — SOCIAL STABILITY: SOCIAL INSECURITY: HAVE YOU HAD ANY THOUGHTS OF HARMING ANYONE ELSE?: NO

## 2024-08-03 SDOH — SOCIAL STABILITY: SOCIAL INSECURITY: DO YOU FEEL ANYONE HAS EXPLOITED OR TAKEN ADVANTAGE OF YOU FINANCIALLY OR OF YOUR PERSONAL PROPERTY?: NO

## 2024-08-03 SDOH — SOCIAL STABILITY: SOCIAL INSECURITY: DO YOU FEEL UNSAFE GOING BACK TO THE PLACE WHERE YOU ARE LIVING?: NO

## 2024-08-03 SDOH — SOCIAL STABILITY: SOCIAL INSECURITY: WERE YOU ABLE TO COMPLETE ALL THE BEHAVIORAL HEALTH SCREENINGS?: YES

## 2024-08-03 SDOH — SOCIAL STABILITY: SOCIAL INSECURITY: ABUSE: ADULT

## 2024-08-03 SDOH — SOCIAL STABILITY: SOCIAL INSECURITY: HAVE YOU HAD THOUGHTS OF HARMING ANYONE ELSE?: NO

## 2024-08-03 ASSESSMENT — ACTIVITIES OF DAILY LIVING (ADL)
PATIENT'S MEMORY ADEQUATE TO SAFELY COMPLETE DAILY ACTIVITIES?: YES
LACK_OF_TRANSPORTATION: NO
DRESSING YOURSELF: INDEPENDENT
JUDGMENT_ADEQUATE_SAFELY_COMPLETE_DAILY_ACTIVITIES: YES
TOILETING: INDEPENDENT
GROOMING: INDEPENDENT
HEARING - RIGHT EAR: FUNCTIONAL
ADEQUATE_TO_COMPLETE_ADL: YES
FEEDING YOURSELF: INDEPENDENT
BATHING: INDEPENDENT
WALKS IN HOME: INDEPENDENT
HEARING - LEFT EAR: FUNCTIONAL

## 2024-08-03 ASSESSMENT — COGNITIVE AND FUNCTIONAL STATUS - GENERAL
MOBILITY SCORE: 24
DAILY ACTIVITIY SCORE: 24
PATIENT BASELINE BEDBOUND: NO

## 2024-08-03 ASSESSMENT — LIFESTYLE VARIABLES
HAVE YOU EVER FELT YOU SHOULD CUT DOWN ON YOUR DRINKING: NO
EVER FELT BAD OR GUILTY ABOUT YOUR DRINKING: NO
HOW OFTEN DO YOU HAVE A DRINK CONTAINING ALCOHOL: NEVER
AUDIT-C TOTAL SCORE: 0
AUDIT-C TOTAL SCORE: 0
TOTAL SCORE: 0
PRESCIPTION_ABUSE_PAST_12_MONTHS: NO
SKIP TO QUESTIONS 9-10: 1
SUBSTANCE_ABUSE_PAST_12_MONTHS: NO
EVER HAD A DRINK FIRST THING IN THE MORNING TO STEADY YOUR NERVES TO GET RID OF A HANGOVER: NO
HOW OFTEN DO YOU HAVE 6 OR MORE DRINKS ON ONE OCCASION: NEVER
HAVE PEOPLE ANNOYED YOU BY CRITICIZING YOUR DRINKING: NO
HOW MANY STANDARD DRINKS CONTAINING ALCOHOL DO YOU HAVE ON A TYPICAL DAY: PATIENT DOES NOT DRINK

## 2024-08-03 ASSESSMENT — PATIENT HEALTH QUESTIONNAIRE - PHQ9
1. LITTLE INTEREST OR PLEASURE IN DOING THINGS: NOT AT ALL
2. FEELING DOWN, DEPRESSED OR HOPELESS: MORE THAN HALF THE DAYS
SUM OF ALL RESPONSES TO PHQ9 QUESTIONS 1 & 2: 2

## 2024-08-03 ASSESSMENT — PAIN SCALES - GENERAL
PAINLEVEL_OUTOF10: 2
PAINLEVEL_OUTOF10: 0 - NO PAIN

## 2024-08-03 ASSESSMENT — PAIN - FUNCTIONAL ASSESSMENT: PAIN_FUNCTIONAL_ASSESSMENT: 0-10

## 2024-08-03 NOTE — ED TRIAGE NOTES
Pt presents with a c/o chest pain. Pt is currently receiving antibiotics via PICC line at home for a pseudomonas infection

## 2024-08-03 NOTE — ED PROVIDER NOTES
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  Patient noted that her right chest pressure began this morning at approximately 8 AM.  She notes worsening of her chest pressure with deep breaths.  Rates her pain as 4 out of 10.  PICC line placed on 8/1/2024 for cefepime.  Patient has noted no issues with her PICC line.  Patient notes compliance with her home medications and bronchiectasis treatment including Pulmicort and vest.  Denies fevers, chills, left upper extremity swelling, left upper extremity numbness or tingling, weakness, headache, abdominal pain, nausea, vomiting, cough, congestion, dysuria, neck pain, back pain, trauma, falls, and loss consciousness.    Limitations to history: None  Independent historian(s): Patient  Records Reviewed: Recent available ED and inpatient notes reviewed in EMR.    PMHx/PSHx:  Per HPI.   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, Chest pain (02/12/2024), Chronic headache, Chronic rhinitis, Contact with and (suspected) exposure to pediculosis, acariasis and other infestations (07/24/2014), COPD (chronic obstructive pulmonary disease) (Multi), Cramps of lower extremity (02/12/2024), Depression, Disorder of kidney and ureter, unspecified (01/19/2016), Encounter for screening mammogram for malignant neoplasm of breast (12/09/2015), Fall due to slipping on ice or snow (02/12/2024), GERD (gastroesophageal reflux disease), Headache, unspecified (06/28/2017), Hemoptysis (10/25/2019), History of cardiovascular disorder (02/12/2024), Hyperlipidemia, Hypothyroidism, Immunodeficiency (Multi), Leukopenia, Menopausal and female climacteric states (07/21/2015), Migraine, Nausea (02/12/2024),  Nondisplaced fracture of neck of right radius, initial encounter for closed fracture (05/08/2019), Osteopenia, Other conditions influencing health status, Other conditions influencing health status, Pain in wrist (02/12/2024), Pancreatic cyst ([facility]), Personal history of diseases of the blood and blood-forming organs and certain disorders involving the immune mechanism (05/27/2022), Personal history of other diseases of the circulatory system (12/07/2018), Personal history of other diseases of the circulatory system (11/05/2018), Personal history of other diseases of the circulatory system (12/07/2018), Personal history of other diseases of the circulatory system, Personal history of other diseases of the female genital tract (07/21/2015), Personal history of other diseases of the female genital tract, Personal history of other diseases of the respiratory system (08/05/2013), Personal history of other diseases of the respiratory system (01/27/2016), Personal history of other diseases of the respiratory system (09/12/2013), Personal history of other diseases of the respiratory system (12/16/2013), Personal history of other diseases of the respiratory system (08/05/2013), Personal history of other diseases of urinary system (03/14/2022), Personal history of other specified conditions (03/10/2016), Personal history of other specified conditions (01/06/2020), Personal history of other specified conditions (11/21/2018), Pneumonia, unspecified organism (11/06/2015), Pseudomonas aeruginosa infection (02/12/2024), Rash (02/12/2024), Rash and other nonspecific skin eruption (12/08/2014), Restless legs, Suspected sleep apnea, Trochanteric bursitis, unspecified hip (04/18/2014), Urinary tract infection, site not specified (02/10/2022), and Uterine prolapse.  - has a past surgical history that includes Hernia repair (10/08/2012); Dilation and curettage of uterus (10/08/2012); and Bronchoscopy.    Medications:  Reviewed in  EMR. See EMR for complete list of medications and doses.    Allergies:  Citalopram, Doxepin, Fluoxetine, Nortriptyline, and Sertraline    Social History:  - Tobacco:  reports that she quit smoking about 47 years ago. Her smoking use included cigarettes. She started smoking about 57 years ago. She has a 15 pack-year smoking history. She has never used smokeless tobacco.   - Alcohol:  reports that she does not currently use alcohol.   - Illicit Drugs:  reports no history of drug use.     ROS:  Per HPI.       ???????????????????????????????????????????????????????????????  Triage Vitals:  T 36.2 °C (97.2 °F)  HR 85  /73  RR 17  O2 97 %      Physical Exam  Vitals and nursing note reviewed.   Constitutional:       General: She is not in acute distress.  HENT:      Head: Normocephalic and atraumatic.      Nose: Nose normal.      Mouth/Throat:      Mouth: Mucous membranes are moist.   Eyes:      Conjunctiva/sclera: Conjunctivae normal.   Cardiovascular:      Rate and Rhythm: Normal rate and regular rhythm.      Pulses: Normal pulses.   Pulmonary:      Effort: Pulmonary effort is normal. No respiratory distress.      Breath sounds: Normal breath sounds.   Chest:      Comments: No TTP of the chest wall.  Abdominal:      Palpations: Abdomen is soft.      Tenderness: There is no abdominal tenderness.   Musculoskeletal:      Comments: LUE PICC in place.  LUE NVI.   Skin:     General: Skin is warm.   Neurological:      General: No focal deficit present.      Mental Status: She is alert.           ???????????????????????????????????????????????????????????????  Labs:   Labs Reviewed   CBC WITH AUTO DIFFERENTIAL - Abnormal       Result Value    WBC 10.3      nRBC 0.0      RBC 3.76 (*)     Hemoglobin 11.2 (*)     Hematocrit 34.6 (*)     MCV 92      MCH 29.8      MCHC 32.4      RDW 13.7      Platelets 287      Neutrophils % 77.8      Immature Granulocytes %, Automated 0.3      Lymphocytes % 10.8      Monocytes % 9.4       Eosinophils % 1.3      Basophils % 0.4      Neutrophils Absolute 7.98 (*)     Immature Granulocytes Absolute, Automated 0.03      Lymphocytes Absolute 1.11      Monocytes Absolute 0.96 (*)     Eosinophils Absolute 0.13      Basophils Absolute 0.04     COMPREHENSIVE METABOLIC PANEL - Abnormal    Glucose 132 (*)     Sodium 137      Potassium 4.1      Chloride 106      Bicarbonate 23      Anion Gap 12      Urea Nitrogen 20      Creatinine 1.24 (*)     eGFR 45 (*)     Calcium 9.7      Albumin 3.3 (*)     Alkaline Phosphatase 59      Total Protein 6.8      AST 15      Bilirubin, Total 0.3      ALT 8     B-TYPE NATRIURETIC PEPTIDE - Abnormal     (*)     Narrative:        <100 pg/mL - Heart failure unlikely  100-299 pg/mL - Intermediate probability of acute heart                  failure exacerbation. Correlate with clinical                  context and patient history.    >=300 pg/mL - Heart Failure likely. Correlate with clinical                  context and patient history.     Biotin interference may cause falsely decreased results. Patients taking a Biotin dose of up to 5 mg/day should refrain from taking Biotin for 24 hours before sample  collection. Providers may contact their local laboratory for further information.   TROPONIN I, HIGH SENSITIVITY - Normal    Troponin I, High Sensitivity ([facility]) 26      Narrative:     Less than 99th percentile of normal range cutoff-  Female and children under 18 years old <35 ng/L; Male <54 ng/L: Negative  Repeat testing should be performed if clinically indicated.     Female and children under 18 years old  ng/L; Male  ng/L:  Consistent with possible cardiac damage and possible increased clinical   risk. Serial measurements may help to assess extent of myocardial damage.     >120 ng/L: Consistent with cardiac damage, increased clinical risk and  myocardial infarction. Serial measurements may help assess extent of   myocardial damage.      NOTE: Children less than  1 year old may have higher baseline troponin   levels and results should be interpreted in conjunction with the overall   clinical context.    NOTE: Troponin I testing is performed using a different   testing methodology at [facility] than at other   system hospitals. Direct result comparisons should only   be made within the same method.     TROPONIN I, HIGH SENSITIVITY        Imaging:   XR chest 2 views   Final Result   1.  No evidence of acute cardiopulmonary process, however there are   chronic lung changes, hyperinflation, and cavitary lesions which are   better characterized on prior CT 07/23/2024.        I personally reviewed the images/study and I agree with the findings   as stated by Steven Zavala MD (Radiology Resident).   This study was interpreted at [facility], [place], [place].        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  Patient is HDS.  Physical exam findings significant for no respiratory distress, unremarkable lung sounds, and left upper extremity PICC in place.  Low clinical concern for pneumothorax and aortic pathology.  Patient ordered cefepime.  Low clinical concern for ACS with unremarkable EKG, BNP, and troponins.  Patient noted to have an ROSA and metabolic panel.  Remainder of metabolic panel was unremarkable.  CBC significant for no leukocytosis and mild anemia.  CXR did not display any acute cardiopulmonary process.  CT PE did not display PE or findings concerning for pneumonia.  Left upper extremity duplex  displayed findings concerning for DVT of the left axillary vein with partial compressibility of the left brachial vein and left basilic vein.  Patient initiated on heparin.  Discussed ED findings and admission with the patient.  Patient stated understanding and agreement the plan.  Discussed patient presentation with admitting team.  Patient admitted to medicine in stable condition.    ED Course:  ED Course as of 08/04/24 0812   Sat Aug 03, 2024   1346 EKG: Rate is 86, sinus rhythm, normal axis, no interval prolongation, no st elevation or depression.  When compared to EKG on 1/15/20 review of EKG does not show any signs of STEMI, complete heart block, asystole, V-fib. [MH]      ED Course User Index  [MH] Robert Ortiz MD         Diagnoses as of 08/04/24 0812   Acute deep vein thrombosis (DVT) of axillary vein of left upper extremity (Multi)       Social Determinants Limiting Care:  None identified    Disposition:  Admission medicine    Robert Ortiz MD   Emergency Medicine PGY-3  [facility]    Comment: Please note this report has been produced using speech recognition software and may contain errors related to that system including errors in grammar, punctuation, and spelling as well as words and phrases that may be inappropriate.  If there are any questions or concerns please feel free to contact the dictating provider for clarification.    Procedures ? Smart[employer] last updated 8/3/2024 3:17 PM        Robert Ortiz MD  Resident  08/05/24 2014

## 2024-08-03 NOTE — SIGNIFICANT EVENT
Senior Staffing Note    HPI:  73-year-old woman with CVID on IVIG Q3 weeks, chronic bronchiectasis, hx of pseudomonas lung colonization, history of MAC pulmonary infection (treated in 2017, 2019), and recurrent sinopulmonary infections presenting for chest pressure. Pt reports having chest pressure with deep inspiration only. Denies any chest pain and reports having SOB. Denies any abdominal pain, nausea or vomiting. States since starting cefepime she reports feeling much better. In the ED, had a LUE DVT US which showed L axillary DVT. Pt denies any upper extremity swelling or numbness in the L arm.     Patient has a hx of ongoing infection of the lung in the setting of known bronchiectasis.  She has a prior sputum culture growing Pseudomonas 6/26 which was treated with levofloxacin then Cipro. However, she continued to be symptomatic with productive cough throughout and had a high resolution CT chest on 7/23 which showed worsening tree-in-bud opacities and infiltrates RUL. Then underwent an outpatient bronch on 7/31 with Dr. Gil. The bronch showed purulent secretions in the R upper lobar bronchus. Bronchoalveolar lavage was performed in the RUL and the fluid appeared cloudy and purulent. She was seen by ID at [facility] for c/f acute exacerbation of bronchiectasis on 8/1. Plan was to start cefepime 2 g every 8 for 14 days after placement of PICC line on 8/1 at [facility].     ED Course:  Labs    Results from last 7 days   Lab Units 08/03/24  1309 07/30/24  1441   WBC AUTO x10*3/uL 10.3 6.9   HEMOGLOBIN g/dL 11.2* 12.5   HEMATOCRIT % 34.6* 40.1   PLATELETS AUTO x10*3/uL 287 360            Results from last 7 days   Lab Units 08/03/24  1309 07/30/24  1441   SODIUM mmol/L 137 138   POTASSIUM mmol/L 4.1 4.3   CHLORIDE mmol/L 106 102   CO2 mmol/L 23 30   BUN mg/dL 20 17   CREATININE mg/dL 1.24* 0.85   CALCIUM mg/dL 9.7 9.3     Results from last 7 days   Lab Units 08/03/24  1309   ALK PHOS U/L 59   BILIRUBIN TOTAL mg/dL 0.3    PROTEIN TOTAL g/dL 6.8   ALT U/L 8   AST U/L 15      Results from last 7 days   Lab Units 08/03/24  1657   APTT seconds 28   INR  1.1      Trop: 26-> 28       Imaging  CT angio chest for pulmonary embolism   Final Result   1. No evidence of acute pulmonary embolism to segmental level.   2. Redemonstration of multifocal mucous plugging, tree-in-bud   nodularity and bronchiectasis/bronchiolitis which is favored to   represent an ongoing infectious process, such as mycobacterium   infection, not significantly changed from recent CT 07/23/2024.   3. Unchanged in the short-term interval right upper lobe cavitary   lesion when compared to prior CT 07/23/2024, as before increased from   January 20, 2024. Short-term follow-up chest CT or PET-CT is   recommended.        I personally reviewed the images/study and I agree with the findings   as stated by Steven Zavala MD (Radiology Resident).   This study was interpreted at [place], [place].        MACRO:   None        Signed by: Sixto Sparks 8/3/2024 7:12 PM   Dictation workstation:   [id number]      Vascular US upper extremity venous duplex left   Final Result   1.  Lack of flow within the left axillary vein with an intraluminal   PICC, compatible with thrombosis.   2. Partial compressibility of the left brachial vein, and left   basilic vein with intraluminal PICC. Persistent flow is noted within   these veins, however a partially occlusive thrombus can not be   excluded.             I personally reviewed the images/study and I agree with the findings   as stated by Kolton Stokes MD. This study was interpreted at   [place], [place].        MACRO:   None        Signed by: Lanny Haider 8/3/2024 4:19 PM   Dictation workstation:   [id number]      XR chest 2 views   Final Result   1.  No evidence of acute cardiopulmonary process, however there are    chronic lung changes, hyperinflation, and cavitary lesions which are   better characterized on prior CT 07/23/2024.        I personally reviewed the images/study and I agree with the findings   as stated by Steven Zavala MD (Radiology Resident).   This study was interpreted at [facility], [place], [place].        MACRO:   None        Signed by: Sachin Mchugh 8/3/2024 2:47 PM   Dictation workstation:   [id number]         ED interventions:   -Started on Heparin gtt     Bronch 7/31:  The pharynx, larynx, vocal cords, trachea, main julia, left lung and right lung appeared normal.  Excessive and purulent secretions present in the right upper lobar bronchus; performed washing  Bronchoalveolar lavage was performed in the RUL and the fluid appeared cloudy and purulent  Dynamic airway collapse    Objective:   Vitals:    08/03/24 1922   BP: 122/73   Pulse: 78   Resp: 20   Temp:    SpO2: 95%     Physical Exam:  General: well appearing, NAD, pleasant  HEENT: NCAT, MMM  CV: RRR, no m/r/g  PULM: CTAB, non-labored respirations   ABD: soft, NT, ND, + bowel sounds   EXT: no LE edema, no UE edema bilaterally, warm to touch bilaterally, L PICC line in place    SKIN: no rashes noted   NEURO: A&Ox4, no gross motor or sensory deficits  PSYCH: pleasant mood, appropriate affect      Assessment and Plan:   73-year-old woman with CVID on IVIG Q3 weeks, chronic bronchiectasis, hx of pseudomonas lung colonization, history of MAC pulmonary infection (treated in 2017, 2019), and recurrent sinopulmonary infections presenting for chest pressure.  In the ED, low concern for ACS and found to have partially occlusive L axillary vein thrombosis like catheter-related for which she was started on heparin gtt. Pt otherwise HDS and currently undergoing outpatient management of acute exacerbation of bronchiectasis and pseudomonas lung colonization with cefepime, which was initiated on 8/1 s/p PICC  placement on 8/1 at [facility].     #L axillary vein thrombus, likely due to PICC line   ::Vascular US showing lack of flow within the left axillary vein with an intraluminal PICC, compatible with thrombosis and partial compressibility of the left brachial vein, and left basilic vein with intraluminal PICC  -Started on low intensity heparin gtt in the ED, will continue   -Consult PICC team in the AM, likely will need to replace the PICC line   -Consider transitioning to LMWH or warfarin in the AM   -Consider vascular medicine consult in the AM     #Acute Bronchiectasis Excerebration   #Pseudomonas lung colonization   #CVID on IVIG   ::undergoing outpatient management with IV cefepime 2 mg Q8H for 14 days (8/1-)  ::underwent bronch on 7/31 with Dr. Gil   -C/w cefepime 2g Q12H (8/1-)   -Consider ID consult in the AM   -Fu bronch studies from 7/31:       -From Bronch washing:     -AFB: pending   -Fungal cx: pending    -Respiratory cx: abundant pseudomonas aeruginosa, abundant PMNs   -C/w chest physiotherapy, albuterol       #ROSA   ::likely prerenal vs newly started on cefepime, baseline 0.8  -Monitor Cr  -Avoid nephrotoxic agents   -Renally dose medications   -Will give 1L LR over two hours     Nok: Danilo Yan () [phone]   Code status: Full code     Rest of plan per intern HPI   To be staffed with attending in the AM    Reta Riggins, PGY3

## 2024-08-03 NOTE — H&P
"HPI:  Abad Yan is a 73 y.o. 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 Patient initiated on heparin. Patient noted to be hemodynamically stable. CT PE did not display PE.     Per chart review over the last several weeks she has had significant fatigue, dyspnea on exertion and productive cough. She had prior sputum culture with Pseudomonas aeruginosa on 06/26, she was initially treated with levofloxacin but then changed to ciprofloxacin. Despite this she did not have significant improvement in her cough and with difficulty expectorating sputum. She had recent follow-up CT of the chest which showed worsening tree-in-bud opacities and infiltrate in the right upper lobe. She was seen by pulmonary Dr. Gil at  and recommended for bronchoscopy which was performed on 7/31 which showed excessive purulent secretions in the right upper lobar bronchus, and culture pending. She was started on Cefepime via picc line on 8/1 with stop date 8/16/24 by ID at [facility] with c/f acute exacerbation of bronchiectasis. She then presented today to the ED for chest pressure and found to have RUE DVT.  CT PE was negative for PE.    At bedside the patient reports improvement in fatigue. She endorses chest pain with deep breathing but it has been getting better. She denies any n/v/d/c, HA, abd pain.    ED course  Vitals:   /73 (BP Location: Right arm, Patient Position: Lying)   Pulse 78   Temp 36.9 °C (98.4 °F) (Oral)   Resp 20   Ht 1.702 m (5' 7\")   Wt 56.2 kg (124 lb)   LMP  (LMP Unknown)   SpO2 95%   BMI 19.42 kg/m²    - Labs:   CBC: WBC 10.3 Hgb 11.2  plt 287   BMP: Na 137, K 4.1 Cl 106 HCO3 23 BUN 20 Cr 1.24glu 132   LFT: Ca 9.7 tprot 6.8, alb 3.3 alkphos 59 AST15 ALT 8 tbili 0.3 dbili _   Electrolytes: PO4 _ Mg " 2.05   Heme: PT 12.2, INR 1.1 aPTT 28   lactate _ Troponin 28   BCx x2 drawn  - Imaging:   CT angio chest for pulmonary embolism    Result Date: 8/3/2024  Interpreted By:  Sixto Sparks and Bartolomei Aguilar Christopher STUDY: CT ANGIO CHEST FOR PULMONARY EMBOLISM;  8/3/2024 6:11 pm   INDICATION: Signs/Symptoms:Rule out PE. sx   COMPARISON: CT chest 07/23/2024 and 01/30/2024   ACCESSION NUMBER(S): [id number]   ORDERING CLINICIAN: SHANTELL AL   TECHNIQUE: Helical data acquisition of the chest was obtained after intravenous administration of 90 mL of Omnipaque 350, as per PE protocol. Images were reformatted in coronal and sagittal planes. Axial and coronal maximum intensity projection (MIP) images were created and reviewed.   FINDINGS: POTENTIAL LIMITATIONS OF THE STUDY: None   HEART AND VESSELS: No discrete filling defects within the main pulmonary artery or its branches to segmental level. Please note that, assessment of subsegmental branches is limited and small peripheral emboli are not entirely excluded. Main pulmonary artery and its branches are normal in caliber.   The thoracic aorta normal in course and caliber.There is mild scattered atherosclerosis present, including calcified and noncalcified plaques.Although, the study is not tailored for evaluation of aorta, there is no definite evidence of acute aortic pathology. No coronary artery calcifications are seen. Please note, the study is not optimized for evaluation of coronary arteries.   The cardiac chambers are not enlarged.   There is no pericardial effusion seen.   MEDIASTINUM AND DIANA, LOWER NECK AND AXILLA: The visualized thyroid gland is within normal limits. No evidence of thoracic lymphadenopathy by CT criteria. Small hiatal hernia.   LUNGS AND AIRWAYS: The trachea and central airways are patent. No endobronchial lesion is seen.   Similar multifocal mucous plugging as well as bronchiectasis within the right upper lobe, right middle lobe  and lingula, when compared to prior CT 07/23/2024. Again there is multifocal tree-in-bud nodularity scattered throughout the left lung and right lower lobe. Again there is a cavitary right upper lobe nodule with internal calcifications, adjacent pleural thickening and satellite nodularity which measures 2.7 x 2.3 cm in the transverse and AP dimensions, not significantly changed when compared to recent prior CT 07/23/2024. No new areas of focal consolidation. No pleural effusion. No pneumothorax.     UPPER ABDOMEN: The visualized subdiaphragmatic structures demonstrate no remarkable findings.       CHEST WALL AND OSSEOUS STRUCTURES: Chest wall is within normal limits. No acute osseous pathology.There are no suspicious osseous lesions.       1. No evidence of acute pulmonary embolism to segmental level. 2. Redemonstration of multifocal mucous plugging, tree-in-bud nodularity and bronchiectasis/bronchiolitis which is favored to represent an ongoing infectious process, such as mycobacterium infection, not significantly changed from recent CT 07/23/2024. 3. Unchanged in the short-term interval right upper lobe cavitary lesion when compared to prior CT 07/23/2024, as before increased from January 20, 2024. Short-term follow-up chest CT or PET-CT is recommended.   I personally reviewed the images/study and I agree with the findings as stated by Steven Zavala MD (Radiology Resident). This study was interpreted at [place], [place].   MACRO: None   Signed by: Sixto Sparks 8/3/2024 7:12 PM Dictation workstation:   [id number]    Vascular US upper extremity venous duplex left    Result Date: 8/3/2024  Interpreted By:  Lanny Haider,  [place] STUDY: VASC US UPPER EXTREMITY VENOUS DUPLEX LEFT;  8/3/2024 3:34 pm   INDICATION: Signs/Symptoms:Evaluate PICC line for clot.   COMPARISON: None.   ACCESSION NUMBER(S): [id number]   ORDERING CLINICIAN:  SHANTELL AL   TECHNIQUE: Vascular ultrasound of the  left upper extremity was performed. Evaluation was performed with grayscale, color, and spectral Doppler. When possible, compression views of the evaluated veins was also performed.   This examination was interpreted at [facility].   FINDINGS: Evaluation of the visualized portions of the  left internal jugular, innominate, subclavian, axillary, and brachial cephalic, and basilic veins was performed.   Partial compressibility of the axillary vein, brachial vein, and left basilic vein which may be secondary to the PICC line. Almost no flow is noted within the axillary vein. There is normal respiratory variation, normal compressibility, as well as normal color doppler signal in the remaining visualized vessels.       1.  Lack of flow within the left axillary vein with an intraluminal PICC, compatible with thrombosis. 2. Partial compressibility of the left brachial vein, and left basilic vein with intraluminal PICC. Persistent flow is noted within these veins, however a partially occlusive thrombus can not be excluded.     I personally reviewed the images/study and I agree with the findings as stated by Kolton Stokes MD. This study was interpreted at [place], [place].   MACRO: None   Signed by: Lanny Haider 8/3/2024 4:19 PM Dictation workstation:   [id number]    XR chest 2 views    Result Date: 8/3/2024  Interpreted By:  Sachin Mchugh and Bartolomei Aguilar Christopher STUDY: XR CHEST 2 VIEWS;  8/3/2024 2:14 pm   INDICATION: Signs/Symptoms:cp.   COMPARISON: CT chest 07/23/2024 and chest radiograph 09/13/2019   ACCESSION NUMBER(S): [id number]   ORDERING CLINICIAN: MARTINA ROACH   FINDINGS: PA and lateral radiographs of the chest were provided.   Left upper extremity PICC line with distal tip projecting over the superior cavoatrial junction.   CARDIOMEDIASTINAL SILHOUETTE:  Cardiomediastinal silhouette is stable in size and configuration.   LUNGS: Lungs are hyperinflated. Cavitary lesion within the right upper lobe measuring 2.6 cm in the craniocaudal dimension. Additional smaller cavitary lesions appear evident however immeasurable on radiograph and better evaluated on prior CT 07/23/2024. Coarse interstitial markings in keeping with chronic lung disease. No pleural effusion, focal consolidation or pneumothorax.   ABDOMEN: No remarkable upper abdominal findings.   BONES: No acute osseous changes.       1.  No evidence of acute cardiopulmonary process, however there are chronic lung changes, hyperinflation, and cavitary lesions which are better characterized on prior CT 07/23/2024.   I personally reviewed the images/study and I agree with the findings as stated by Steven Zavala MD (Radiology Resident). This study was interpreted at [place], [place].   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     Chest pain 02/12/2024    Chronic headache     Chronic rhinitis     Chronic rhinitis    Contact with and (suspected) exposure to pediculosis, acariasis and other infestations 07/24/2014    Scabies exposure    COPD (chronic obstructive pulmonary disease) (Multi)     Cramps of lower extremity 02/12/2024    Depression     Disorder of kidney and ureter, unspecified 01/19/2016    Mild renal insufficiency    Encounter for screening mammogram for malignant neoplasm of breast 12/09/2015    Other screening mammogram    Fall due to slipping on ice or snow 02/12/2024    GERD (gastroesophageal reflux disease)     Headache, unspecified 06/28/2017    Worsening headaches     Hemoptysis 10/25/2019    Cough with hemoptysis    History of cardiovascular disorder 02/12/2024    Hyperlipidemia     Hypothyroidism     Immunodeficiency (Multi)     Leukopenia     Menopausal and female climacteric states 07/21/2015    Menopausal symptoms    Migraine     Nausea 02/12/2024    Nondisplaced fracture of neck of right radius, initial encounter for closed fracture 05/08/2019    Osteopenia     Other conditions influencing health status     Acute Sphenoidal Sinusitis    Other conditions influencing health status     Pulmonary Disease    Pain in wrist 02/12/2024    Pancreatic cyst (HHS-HCC)     Personal history of diseases of the blood and blood-forming organs and certain disorders involving the immune mechanism 05/27/2022    History of immunodeficiency    Personal history of other diseases of the circulatory system 12/07/2018    History of diastolic dysfunction    Personal history of other diseases of the circulatory system 11/05/2018    History of abnormal electrocardiography    Personal history of other diseases of the circulatory system 12/07/2018    History of cardiomyopathy    Personal history of other diseases of the circulatory system     History of peripheral vascular disease    Personal history of other diseases of the female genital tract 07/21/2015    History of postmenopausal bleeding    Personal history of other diseases of the female genital tract     History of pelvic inflammatory disease    Personal history of other diseases of the respiratory system 08/05/2013    Personal history of asthma    Personal history of other diseases of the respiratory system 01/27/2016    History of upper respiratory infection    Personal history of other diseases of the respiratory system 09/12/2013    History of upper respiratory infection    Personal history of other diseases of the respiratory system 12/16/2013    History of upper respiratory infection    Personal history of other diseases of the respiratory  system 08/05/2013    History of allergic rhinitis    Personal history of other diseases of urinary system 03/14/2022    History of pyelonephritis    Personal history of other specified conditions 03/10/2016    History of abnormal mammogram    Personal history of other specified conditions 01/06/2020    History of chest pain    Personal history of other specified conditions 11/21/2018    History of hemoptysis    Pneumonia, unspecified organism 11/06/2015    CAP (community acquired pneumonia)    Pseudomonas aeruginosa infection 02/12/2024    Rash 02/12/2024    Rash and other nonspecific skin eruption 12/08/2014    Rash of neck    Restless legs     Suspected sleep apnea     Trochanteric bursitis, unspecified hip 04/18/2014    Trochanteric bursitis    Urinary tract infection, site not specified 02/10/2022    Acute UTI    Uterine prolapse        Surgical history:  Past Surgical History:   Procedure Laterality Date    BRONCHOSCOPY      DILATION AND CURETTAGE OF UTERUS  10/08/2012    Dilation And Curettage    HERNIA REPAIR  10/08/2012    Hernia Repair       Medications prior to admission:  Prior to Admission medications    Medication Sig Start Date End Date Taking? Authorizing Provider   aflibercept (Eylea) 2 mg/0.05 mL intra-ocular injection 0.05 mL (2 mg) by intravitreal route 1 time.    Historical Provider, MD   albuterol 2.5 mg /3 mL (0.083 %) nebulizer solution Take 3 mL (2.5 mg) by nebulization 2 times a day. 2/16/24   Crissy Gil MD   albuterol 90 mcg/actuation inhaler Inhale 2 puffs 2 times a day.    Historical Provider, MD   azelastine (Astelin) 137 mcg (0.1 %) nasal spray  4/13/23   Historical Provider, MD   azelastine 205.5 mcg (0.15 %) spray,non-aerosol Administer into affected nostril(s) twice a day.    Historical Provider, MD   budesonide (Pulmicort) 0.5 mg/2 mL nebulizer solution Inhale 2 mL (0.5 mg) 2 times a day. 8/25/15   Historical Provider, MD   CALCIUM CARBONATE-VITAMIN D3 ORAL Take 1 tablet by mouth  once daily.    Historical Provider, MD   coenzyme Q-10 100 mg capsule Take 1 capsule (100 mg) by mouth.    Historical Provider, MD   cranberry 500 mg capsule     Historical Provider, MD   escitalopram (Lexapro) 5 mg tablet Take one tablet qpm 3/14/24   Eva De Paz MD   estradiol (Estrace) 0.01 % (0.1 mg/gram) vaginal cream Insert 0.25 Applicatorfuls (1 g) into the vagina 2 times a week. 3/11/24   Chelsey Arroyo MD   famotidine (Pepcid) 20 mg tablet Take one tablet in empty stomach in amand one table in empty stomach in the afternoon 3/14/24   Eva De Paz MD   ferrous sulfate  mg ER tablet Take 1 tablet by mouth once daily. 4/14/15   Historical Provider, MD   folic acid (Folvite) 1 mg tablet Take 1 tablet (1 mg) by mouth once daily.    Historical Provider, MD   ibuprofen (Motrin) capsule Take 3 capsules (600 mg) by mouth once daily as needed.    Historical Provider, MD   immune globulin, human, (Privigen) 10 % solution infusion orally every 3 weeks    Historical Provider, MD   ipratropium (Atrovent) 42 mcg (0.06 %) nasal spray Administer into affected nostril(s). 9/30/15   Historical Provider, MD   levothyroxine (Synthroid, Levoxyl) 75 mcg tablet TAKE ONE AND ONE-HALF TABLETS ON SUNDAY AND TAKE 1 TABLET THE OTHER 6 DAYS OF THE WEEK, 30 MINUTES PRIOR TO BREAKFAST (NOTE NEW DOSE)  Patient taking differently: Take 1 tablet (75 mcg) by mouth once daily in the morning. Take before meals. Take 1 tablet daily 3/12/24   Eva De Paz MD   magnesium oxide (Mag-Ox) 250 mg magnesium tablet     Historical Provider, MD   multivitamin (Multiple Vitamins) tablet Take 1 tablet by mouth once daily. 4/8/20   Historical Provider, MD   potassium chloride ER (Micro-K) 10 mEq ER capsule TAKE 1 CAPSULE DAILY WITH FOOD 3/14/24   Eva De Paz MD   psyllium (Metamucil) 0.4 gram capsule Take by mouth. 3/17/23   Lisandro Provider, MD   rOPINIRole (Requip) 4 mg tablet TAKE 1 TABLET DAILY AT BEDTIME  6/17/24   Eva De Paz MD   rosuvastatin (Crestor) 10 mg tablet TAKE 1 TABLET DAILY 6/11/24   Esvin Kirby MD   rosuvastatin 10 mg capsule, sprinkle Take 10 mg by mouth once daily.  Patient not taking: Reported on 7/30/2024 3/14/24   Eva De Paz MD   sodium chloride 3% nebulizer solution Take 3 mL by nebulization 3 times a day. 7/31/24 8/30/24  Crissy Gil MD   tretinoin (Retin-A) 0.025 % cream Apply topically once daily at bedtime. A pea-sized amount to the whole face, start every 2-3 nights and gradually increase to nightly 5/9/24 5/9/25  Deborah Weeks MD   sodium chloride 3% nebulizer solution Take 3 mL by nebulization 2 times a day. 4/9/24 7/30/24  Crissy Gil MD   sodium chloride 3% nebulizer solution Take 3 mL by nebulization 3 times a day. 7/30/24 7/31/24  Crissy Gil MD     Medication Documentation Review Audit       Reviewed by Nano Santiago RN (Registered Nurse) on 07/31/24 at 1105      Medication Order Taking? Sig Documenting Provider Last Dose Status   aflibercept (Eylea) 2 mg/0.05 mL intra-ocular injection [id number] Yes 0.05 mL (2 mg) by intravitreal route 1 time. Lisandro Perez MD Past Week Active   albuterol 2.5 mg /3 mL (0.083 %) nebulizer solution [id number] Yes Take 3 mL (2.5 mg) by nebulization 2 times a day. Crissy Gil MD 7/31/2024 Active   albuterol 90 mcg/actuation inhaler [id number] No Inhale 2 puffs 2 times a day. Lisandro Perez MD More than a month Active   azelastine (Astelin) 137 mcg (0.1 %) nasal spray [id number]   Historical Provider, MD  Active   azelastine 205.5 mcg (0.15 %) spray,non-aerosol [id number] Yes Administer into affected nostril(s) twice a day. Lisandro Peerz MD 7/31/2024 Active   budesonide (Pulmicort) 0.5 mg/2 mL nebulizer solution [id number] Yes Inhale 2 mL (0.5 mg) 2 times a day. Historical Provider, MD 7/31/2024 Active   CALCIUM CARBONATE-VITAMIN D3 ORAL [id number] Yes Take 1 tablet by mouth once daily. Historical  MD Ana 7/30/2024 Active   coenzyme Q-10 100 mg capsule [id number] Yes Take 1 capsule (100 mg) by mouth. Lisandro Perez MD 7/30/2024 Active   cranberry 500 mg capsule [id number] Yes  Historical Provider, MD 7/30/2024 Active   escitalopram (Lexapro) 5 mg tablet [id number] Yes Take one tablet qpm Eva De Paz MD Past Week Active   estradiol (Estrace) 0.01 % (0.1 mg/gram) vaginal cream [id number] Yes Insert 0.25 Applicatorfuls (1 g) into the vagina 2 times a week. Chelsey Arroyo MD Past Week Active   famotidine (Pepcid) 20 mg tablet [id number] Yes Take one tablet in empty stomach in amand one table in empty stomach in the afternoon Eva De Paz MD 7/30/2024 Active   ferrous sulfate  mg ER tablet [id number] Yes Take 1 tablet by mouth once daily. Historical Provider, MD Past Week Active   folic acid (Folvite) 1 mg tablet [id number] Yes Take 1 tablet (1 mg) by mouth once daily. Historical Provider, MD 7/30/2024 Active   ibuprofen (Motrin) capsule [id number] Yes Take 3 capsules (600 mg) by mouth once daily as needed. Historical Provider, MD Past Month Active   immune globulin, human, (Privigen) 10 % solution infusion [id number] Yes orally every 3 weeks Historical Provider, MD Past Month Active   ipratropium (Atrovent) 42 mcg (0.06 %) nasal spray [id number] Yes Administer into affected nostril(s). Lisanrdo Perez MD 7/30/2024 Active   levothyroxine (Synthroid, Levoxyl) 75 mcg tablet [id number] Yes TAKE ONE AND ONE-HALF TABLETS ON SUNDAY AND TAKE 1 TABLET THE OTHER 6 DAYS OF THE WEEK, 30 MINUTES PRIOR TO BREAKFAST (NOTE NEW DOSE)   Patient taking differently: Take 1 tablet (75 mcg) by mouth once daily in the morning. Take before meals. Take 1 tablet daily    Eva De Paz MD 7/31/2024 Active   magnesium oxide (Mag-Ox) 250 mg magnesium tablet [id number] Yes  Historical Provider, MD Past Week Active   multivitamin (Multiple Vitamins) tablet [id number] Yes Take 1 tablet by mouth once daily.  Historical Provider, MD Past Week Active   potassium chloride ER (Micro-K) 10 mEq ER capsule [id number] Yes TAKE 1 CAPSULE DAILY WITH FOOD Eva De Paz MD Past Week Active   psyllium (Metamucil) 0.4 gram capsule [id number] Yes Take by mouth. Historical Provider, MD Past Week Active   rOPINIRole (Requip) 4 mg tablet [id number] Yes TAKE 1 TABLET DAILY AT BEDTIME Eva De Paz MD Past Week Active   rosuvastatin (Crestor) 10 mg tablet [id number] Yes TAKE 1 TABLET DAILY Esvin Kirby MD Past Week Active   rosuvastatin 10 mg capsule, sprinkle [id number] No Take 10 mg by mouth once daily.   Patient not taking: Reported on 7/30/2024    Eva De Paz MD Not Taking Active   sodium chloride 3% nebulizer solution [id number] Yes Take 3 mL by nebulization 3 times a day. Crissy Gil MD 7/31/2024 Active   tretinoin (Retin-A) 0.025 % cream [id number] Yes Apply topically once daily at bedtime. A pea-sized amount to the whole face, start every 2-3 nights and gradually increase to nightly Deborah Weeks MD 7/30/2024 Active                    Allergies:  Allergies   Allergen Reactions    Citalopram Hives    Doxepin Hives    Fluoxetine Hives    Nortriptyline Hives    Sertraline Hives       Family history:  Family History   Problem Relation Name Age of Onset    Cancer Brother      Other (cardiovascular disorder) Brother      Allergies Other      Cancer Other      Hearing loss Other      Hypertension Other      Other (pulmonary disease) Other      Breast cancer Father's Sister  90     Non-contributory    Social history:   reports that she quit smoking about 47 years ago. Her smoking use included cigarettes. She started smoking about 57 years ago. She has a 15 pack-year smoking history. She has never used smokeless tobacco. She reports that she does not currently use alcohol. She reports that she does not use drugs.  Denies EtOH, illicits, or tobacco use    Review of systems:  12 point ROS otherwise  negative      Vitals:    24 Hour Vitals  Temperature:  [36.2 °C (97.2 °F)-36.9 °C (98.4 °F)] 36.9 °C (98.4 °F)  Heart Rate:  [72-85] 78  Respirations:  [17-20] 20  BP: (110-122)/(73) 122/73  FiO2 (%):  [21 %] 21 %    Temp (24hrs), Av.6 °C (97.8 °F), Min:36.2 °C (97.2 °F), Max:36.9 °C (98.4 °F)     24 hour Intake/Output  No intake or output data in the 24 hours ending 24 192     Physical exam:  Constitutional: Well-developed female in no acute distress.  HEENT: NCAT. EOMI. No JVD  Respiratory: CTAB. No wheezes, crackles, or rhonchi. Normal respiratory effort on RA.  Cardiovascular: RRR, normal S1/S2, No murmurs, rubs, or gallops.   Abdominal: Soft, nondistended, nontender to palpation. No rebound or guarding  Neuro: CN II-XII grossly intact. Moving all extremities with no focal deficits  MSK: WWP, no peripheral edema  Skin: No lesions, wounds, or bruising  Psych: Appropriate mood and affect.      Medications   Scheduled Medications  cefepime, 2 g, intravenous, q12h     Continuous Medications  heparin, 0-4,500 Units/hr, Last Rate: 1,000 Units/hr (24 1723)     PRN Medications  PRN medications: heparin       Labs  CBC  Results from last 72 hours   Lab Units 24  1309   WBC AUTO x10*3/uL 10.3   HEMOGLOBIN g/dL 11.2*   HEMATOCRIT % 34.6*   PLATELETS AUTO x10*3/uL 287        BMP  Results from last 72 hours   Lab Units 24  1309   SODIUM mmol/L 137   POTASSIUM mmol/L 4.1   CHLORIDE mmol/L 106   BUN mg/dL 20   CREATININE mg/dL 1.24*       Imaging:  === 10/28/21 ===    US THYROID    - Impression -  No thyromegaly. Stable heterogeneous echogenicity throughout.    Grossly stable multinodular thyroid.   === 24 ===    CT ANGIO CHEST FOR PULMONARY EMBOLISM    - Impression -  1. No evidence of acute pulmonary embolism to segmental level.  2. Redemonstration of multifocal mucous plugging, tree-in-bud  nodularity and bronchiectasis/bronchiolitis which is favored to  represent an ongoing infectious  process, such as mycobacterium  infection, not significantly changed from recent CT 07/23/2024.  3. Unchanged in the short-term interval right upper lobe cavitary  lesion when compared to prior CT 07/23/2024, as before increased from  January 20, 2024. Short-term follow-up chest CT or PET-CT is  recommended.    I personally reviewed the images/study and I agree with the findings  as stated by Steven Zavala MD (Radiology Resident).  This study was interpreted at [place], [place].    MACRO:  None    Signed by: Sixto Sparks 8/3/2024 7:12 PM  Dictation workstation:   [id number]            Assessment and plan:  Abad Yan is a 73 y.o. 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 Patient initiated on heparin. Patient noted to be hemodynamically stable. CT PE did not display PE.     #CVID  #Bronchiectasis with acute exacerbation  # Psudomonas infection  :: IVIG every 3 weeks  :: Previously treated for pseudomonas in 6/24 with levo and cipro  :: Bronch with Dr. Gil on 7/31 showed excessive purulent secretions in the right upper lobar bronchus, and culture pending.   :: CT of the chest which showed worsening tree-in-bud opacities and infiltrate in the right upper lobe   :: started on Cefepime q8 via picc line by ID at [facility] for acute exacerbation  :: CT Angio neg for PE  Plan:  Continue Cefipime 2 mg q8 for 14 days (end date 8/16)  Follow bronch studies  Consider ID consult in the AM    #ROSA  :: Cr on admission 1.24 ( baseline 0.8)  Plan:  1L LR over 2 hours  -Monitor Cr  -Avoid nephrotoxic agents   -Renally dose medications     #LUE DVT  #L axillary vein thrombus    :: UE duplex positive for DVT  :: started on IV heparin in the ED  Plan:  Started on  heparin gtt in the ED, will continue   Consider transitioning to LMWH or warfarin in the AM     #Dispo      F: PRN  E: PRN  N: Regular diet  A: Picc line  GI: None indicated  DVT prophylaxis: Heparin    Code status: Full (CONFIRMED ON ADMISSION)  Surrogate decision maker: Danilo () [phone]      Beth Arreaga MD  PGY-1 Internal Medicine

## 2024-08-04 LAB
ALBUMIN SERPL BCP-MCNC: 3 G/DL (ref 3.4–5)
ANION GAP SERPL CALC-SCNC: 12 MMOL/L (ref 10–20)
APPEARANCE UR: CLEAR
APTT PPP: 71 SECONDS (ref 27–38)
ATRIAL RATE: 86 BPM
BACTERIA SPEC RESP CULT: ABNORMAL
BASOPHILS # BLD AUTO: 0.03 X10*3/UL (ref 0–0.1)
BASOPHILS NFR BLD AUTO: 0.4 %
BILIRUB UR STRIP.AUTO-MCNC: NEGATIVE MG/DL
BUN SERPL-MCNC: 17 MG/DL (ref 6–23)
CALCIUM SERPL-MCNC: 8.9 MG/DL (ref 8.6–10.6)
CHLORIDE SERPL-SCNC: 105 MMOL/L (ref 98–107)
CHLORIDE UR-SCNC: 55 MMOL/L
CHLORIDE/CREATININE (MMOL/G) IN URINE: 104 MMOL/G CREAT (ref 38–318)
CO2 SERPL-SCNC: 25 MMOL/L (ref 21–32)
COLOR UR: NORMAL
CREAT SERPL-MCNC: 1.26 MG/DL (ref 0.5–1.05)
CREAT UR-MCNC: 53.1 MG/DL (ref 20–320)
EGFRCR SERPLBLD CKD-EPI 2021: 45 ML/MIN/1.73M*2
EOSINOPHIL # BLD AUTO: 0.21 X10*3/UL (ref 0–0.4)
EOSINOPHIL NFR BLD AUTO: 2.6 %
ERYTHROCYTE [DISTWIDTH] IN BLOOD BY AUTOMATED COUNT: 13.4 % (ref 11.5–14.5)
GLUCOSE SERPL-MCNC: 119 MG/DL (ref 74–99)
GLUCOSE UR STRIP.AUTO-MCNC: NORMAL MG/DL
GRAM STN SPEC: ABNORMAL
HCT VFR BLD AUTO: 33.5 % (ref 36–46)
HGB BLD-MCNC: 11 G/DL (ref 12–16)
IMM GRANULOCYTES # BLD AUTO: 0.03 X10*3/UL (ref 0–0.5)
IMM GRANULOCYTES NFR BLD AUTO: 0.4 % (ref 0–0.9)
KETONES UR STRIP.AUTO-MCNC: NEGATIVE MG/DL
LEUKOCYTE ESTERASE UR QL STRIP.AUTO: NEGATIVE
LYMPHOCYTES # BLD AUTO: 1.89 X10*3/UL (ref 0.8–3)
LYMPHOCYTES NFR BLD AUTO: 23 %
MAGNESIUM SERPL-MCNC: 1.82 MG/DL (ref 1.6–2.4)
MCH RBC QN AUTO: 30.1 PG (ref 26–34)
MCHC RBC AUTO-ENTMCNC: 32.8 G/DL (ref 32–36)
MCV RBC AUTO: 92 FL (ref 80–100)
MONOCYTES # BLD AUTO: 0.78 X10*3/UL (ref 0.05–0.8)
MONOCYTES NFR BLD AUTO: 9.5 %
NEUTROPHILS # BLD AUTO: 5.27 X10*3/UL (ref 1.6–5.5)
NEUTROPHILS NFR BLD AUTO: 64.1 %
NITRITE UR QL STRIP.AUTO: NEGATIVE
NRBC BLD-RTO: 0 /100 WBCS (ref 0–0)
P AXIS: 89 DEGREES
P OFFSET: 190 MS
P ONSET: 141 MS
PH UR STRIP.AUTO: 5.5 [PH]
PHOSPHATE SERPL-MCNC: 3.6 MG/DL (ref 2.5–4.9)
PLATELET # BLD AUTO: 299 X10*3/UL (ref 150–450)
POTASSIUM SERPL-SCNC: 3.8 MMOL/L (ref 3.5–5.3)
POTASSIUM UR-SCNC: 25 MMOL/L
POTASSIUM/CREAT UR-RTO: 47 MMOL/G CREAT
PR INTERVAL: 148 MS
PROT UR STRIP.AUTO-MCNC: NEGATIVE MG/DL
Q ONSET: 215 MS
QRS COUNT: 14 BEATS
QRS DURATION: 108 MS
QT INTERVAL: 370 MS
QTC CALCULATION(BAZETT): 442 MS
QTC FREDERICIA: 417 MS
R AXIS: 75 DEGREES
RBC # BLD AUTO: 3.65 X10*6/UL (ref 4–5.2)
RBC # UR STRIP.AUTO: NEGATIVE /UL
SODIUM SERPL-SCNC: 138 MMOL/L (ref 136–145)
SODIUM UR-SCNC: 36 MMOL/L
SODIUM/CREAT UR-RTO: 68 MMOL/G CREAT
SP GR UR STRIP.AUTO: 1.01
T AXIS: 70 DEGREES
T OFFSET: 400 MS
UFH PPP CHRO-ACNC: 0.4 IU/ML
UROBILINOGEN UR STRIP.AUTO-MCNC: NORMAL MG/DL
VENTRICULAR RATE: 86 BPM
WBC # BLD AUTO: 8.2 X10*3/UL (ref 4.4–11.3)

## 2024-08-04 PROCEDURE — 2500000004 HC RX 250 GENERAL PHARMACY W/ HCPCS (ALT 636 FOR OP/ED)

## 2024-08-04 PROCEDURE — 2500000005 HC RX 250 GENERAL PHARMACY W/O HCPCS

## 2024-08-04 PROCEDURE — 94640 AIRWAY INHALATION TREATMENT: CPT

## 2024-08-04 PROCEDURE — 2500000002 HC RX 250 W HCPCS SELF ADMINISTERED DRUGS (ALT 637 FOR MEDICARE OP, ALT 636 FOR OP/ED)

## 2024-08-04 PROCEDURE — 2500000001 HC RX 250 WO HCPCS SELF ADMINISTERED DRUGS (ALT 637 FOR MEDICARE OP)

## 2024-08-04 PROCEDURE — 80069 RENAL FUNCTION PANEL: CPT

## 2024-08-04 PROCEDURE — 85730 THROMBOPLASTIN TIME PARTIAL: CPT

## 2024-08-04 PROCEDURE — 1210000001 HC SEMI-PRIVATE ROOM DAILY

## 2024-08-04 PROCEDURE — 2500000004 HC RX 250 GENERAL PHARMACY W/ HCPCS (ALT 636 FOR OP/ED): Mod: JZ

## 2024-08-04 PROCEDURE — 85520 HEPARIN ASSAY: CPT

## 2024-08-04 PROCEDURE — 82436 ASSAY OF URINE CHLORIDE: CPT

## 2024-08-04 PROCEDURE — 36416 COLLJ CAPILLARY BLOOD SPEC: CPT

## 2024-08-04 PROCEDURE — 85025 COMPLETE CBC W/AUTO DIFF WBC: CPT

## 2024-08-04 PROCEDURE — 83735 ASSAY OF MAGNESIUM: CPT

## 2024-08-04 PROCEDURE — 81003 URINALYSIS AUTO W/O SCOPE: CPT

## 2024-08-04 RX ORDER — ACETAMINOPHEN 325 MG/1
975 TABLET ORAL EVERY 6 HOURS PRN
Status: DISCONTINUED | OUTPATIENT
Start: 2024-08-04 | End: 2024-08-05 | Stop reason: HOSPADM

## 2024-08-04 RX ORDER — AZELASTINE 1 MG/ML
2 SPRAY, METERED NASAL 2 TIMES DAILY PRN
Status: DISCONTINUED | OUTPATIENT
Start: 2024-08-04 | End: 2024-08-05 | Stop reason: HOSPADM

## 2024-08-04 RX ORDER — IPRATROPIUM BROMIDE 21 UG/1
2 SPRAY, METERED NASAL 2 TIMES DAILY PRN
Status: DISCONTINUED | OUTPATIENT
Start: 2024-08-04 | End: 2024-08-05 | Stop reason: HOSPADM

## 2024-08-04 RX ORDER — ONDANSETRON 4 MG/1
4 TABLET, FILM COATED ORAL ONCE
Status: COMPLETED | OUTPATIENT
Start: 2024-08-04 | End: 2024-08-04

## 2024-08-04 ASSESSMENT — COGNITIVE AND FUNCTIONAL STATUS - GENERAL
DAILY ACTIVITIY SCORE: 24
DAILY ACTIVITIY SCORE: 24
MOBILITY SCORE: 24
MOBILITY SCORE: 24

## 2024-08-04 ASSESSMENT — PAIN DESCRIPTION - LOCATION: LOCATION: NECK

## 2024-08-04 ASSESSMENT — PAIN SCALES - GENERAL
PAINLEVEL_OUTOF10: 0 - NO PAIN
PAINLEVEL_OUTOF10: 6

## 2024-08-04 ASSESSMENT — PAIN - FUNCTIONAL ASSESSMENT: PAIN_FUNCTIONAL_ASSESSMENT: 0-10

## 2024-08-04 NOTE — HOSPITAL COURSE
Abad Yan is a 73-year-old woman with CVID (on IVIG q3 wks), chronic bronchiectasis, hx of pseudomonas lung colonization, history of MAC pulmonary infection (tx'ed in 2017, 2019), and recurrent sinopulmonary infections who presented to the [facility] ED for chest pressure. She was found to have LUE DVT in axillary vein which is likely catheter-related given patient was s/p placement of PICC line in E on 8/1/2024 for treatment of acute exacerbation of bronchiectasis. Outpatient workup for bronchiectasis exacerbation remarkable for CT chest on (7/23) with worsening tree-in-bud opacities and RUL infiltrates and outpatient bronchoscopy (Dr. Gil on 7/31) with purulent secretions in the RUL bronchus and RUL BAL with cloudy and purulent fluid. Continued cefepime which was started outpatient for treatment of pseudomonas lung colonization and exacerbation of bronchiectasis (plan for 14 days of cefepime). PICC line was functional (per nursing, able to draw back blood and flush without resistance), so kept PICC line in place for treatment with cefepime until 8/16. For LUE DVT, patient was started on heparin gtt in the ED. Transitioned to apixaban on hospital day 1.      Workup in the ED was also notable for an ROSA with Cr of 1.24 from baseline of .85. Obtained UA and urine lytes which suggested ROSA was prerenal. Patient received 1 L fluid bolus on day of admission and additional 1 L fluid bolus on hospital day 1. Cefepime was also renally adjusted to 2 g q12h from PTA 2 g q8h.     On the day of discharge, communicated with Dr. Galicia at [facility] who was treating patient's acute exacerbation of bronchiectasis and pseudomonas infection. She recommended continuing on renally adjusted cefepime until she sees the patient virtually on August 8 and is able to check an RFP.

## 2024-08-04 NOTE — PROGRESS NOTES
Pharmacy Admission Order Reconciliation Review    Abad Yan is a 73 y.o. female admitted for Acute deep vein thrombosis (DVT) of axillary vein of left upper extremity (Multi). Pharmacy reviewed the patient's unreconciled admission medications.    Prior to admission medications that were reviewed and acted on by the pharmacist include:  Eylea intra-ocular injection   These medications have been reconciled.     Any other unreconcilied medications have been addressed and will be ordered or held by the patient's medical team. Medications addressed by the pharmacist may be added or changed by the patient's medical team at any time.    Ortega Keller, PharmD  Transitions of Care Pharmacist  [facility]  Please reach out via Secure Chat for questions

## 2024-08-04 NOTE — PROGRESS NOTES
"Abad Yan is a 73 y.o. female on day 1 of admission presenting with Acute deep vein thrombosis (DVT) of axillary vein of left upper extremity (Multi).    Subjective   No acute events overnight. This morning, the patient reports that her chest pressure with inspiration is improved since she has been on heparin. She denies chest pain and dyspnea. She reports that her symptoms of her chronic bronchiectasis exacerbation including cough productive of yellow sputum are \"much, much better.\" She endorsed nausea overnight which improved with Zofran 4 mg PO and is now resolved.        Objective     Physical exam:  Constitutional: Well-developed female in no acute distress.  HEENT: NCAT. EOMI. No JVD  Respiratory: CTAB. No wheezes, crackles, or rhonchi. Normal respiratory effort on RA.  Cardiovascular: RRR, normal S1/S2, No murmurs, rubs, or gallops.   Abdominal: Soft, nondistended, nontender to palpation. No rebound or guarding  Neuro: Alert and oriented. Moving all extremities with no focal deficits  Extremities: WWP, no peripheral edema  Psych: Appropriate mood and affect.    Last Recorded Vitals  Blood pressure 100/55, pulse 56, temperature 36.2 °C (97.2 °F), resp. rate 17, height 1.702 m (5' 7\"), weight 56.2 kg (124 lb), SpO2 95%, not currently breastfeeding.  Intake/Output last 3 Shifts:  I/O last 3 completed shifts:  In: 1540 (27.4 mL/kg) [P.O.:360; I.V.:80 (1.4 mL/kg); IV Piggyback:1100]  Out: - (0 mL/kg)   Weight: 56.2 kg     Medications:  albuterol, 2.5 mg, nebulization, BID  budesonide, 0.5 mg, nebulization, BID  cefepime, 2 g, intravenous, q12h  escitalopram, 5 mg, oral, Daily  famotidine, 20 mg, oral, Daily  folic acid, 1 mg, oral, Daily  levothyroxine, 75 mcg, oral, Daily before breakfast  rOPINIRole, 4 mg, oral, Nightly  rosuvastatin, 10 mg, oral, Nightly  sodium chloride, 3 mL, nebulization, TID           Relevant Results  Scheduled medications  albuterol, 2.5 mg, nebulization, BID  budesonide, 0.5 " mg, nebulization, BID  cefepime, 2 g, intravenous, q12h  escitalopram, 5 mg, oral, Daily  famotidine, 20 mg, oral, Daily  folic acid, 1 mg, oral, Daily  levothyroxine, 75 mcg, oral, Daily before breakfast  rOPINIRole, 4 mg, oral, Nightly  rosuvastatin, 10 mg, oral, Nightly  sodium chloride, 3 mL, nebulization, TID      Continuous medications  heparin, 0-4,500 Units/hr, Last Rate: 1,000 Units/hr (08/03/24 1723)      PRN medications  PRN medications: azelastine, heparin, ipratropium, polyethylene glycol    Results for orders placed or performed during the hospital encounter of 08/03/24 (from the past 24 hour(s))   ECG 12 lead   Result Value Ref Range    Ventricular Rate 86 BPM    Atrial Rate 86 BPM    WI Interval 148 ms    QRS Duration 108 ms    QT Interval 370 ms    QTC Calculation(Bazett) 442 ms    P Axis 89 degrees    R Axis 75 degrees    T Axis 70 degrees    QRS Count 14 beats    Q Onset 215 ms    P Onset 141 ms    P Offset 190 ms    T Offset 400 ms    QTC Fredericia 417 ms   CBC and Auto Differential   Result Value Ref Range    WBC 10.3 4.4 - 11.3 x10*3/uL    nRBC 0.0 0.0 - 0.0 /100 WBCs    RBC 3.76 (L) 4.00 - 5.20 x10*6/uL    Hemoglobin 11.2 (L) 12.0 - 16.0 g/dL    Hematocrit 34.6 (L) 36.0 - 46.0 %    MCV 92 80 - 100 fL    MCH 29.8 26.0 - 34.0 pg    MCHC 32.4 32.0 - 36.0 g/dL    RDW 13.7 11.5 - 14.5 %    Platelets 287 150 - 450 x10*3/uL    Neutrophils % 77.8 40.0 - 80.0 %    Immature Granulocytes %, Automated 0.3 0.0 - 0.9 %    Lymphocytes % 10.8 13.0 - 44.0 %    Monocytes % 9.4 2.0 - 10.0 %    Eosinophils % 1.3 0.0 - 6.0 %    Basophils % 0.4 0.0 - 2.0 %    Neutrophils Absolute 7.98 (H) 1.60 - 5.50 x10*3/uL    Immature Granulocytes Absolute, Automated 0.03 0.00 - 0.50 x10*3/uL    Lymphocytes Absolute 1.11 0.80 - 3.00 x10*3/uL    Monocytes Absolute 0.96 (H) 0.05 - 0.80 x10*3/uL    Eosinophils Absolute 0.13 0.00 - 0.40 x10*3/uL    Basophils Absolute 0.04 0.00 - 0.10 x10*3/uL   Comprehensive metabolic panel    Result Value Ref Range    Glucose 132 (H) 74 - 99 mg/dL    Sodium 137 136 - 145 mmol/L    Potassium 4.1 3.5 - 5.3 mmol/L    Chloride 106 98 - 107 mmol/L    Bicarbonate 23 21 - 32 mmol/L    Anion Gap 12 10 - 20 mmol/L    Urea Nitrogen 20 6 - 23 mg/dL    Creatinine 1.24 (H) 0.50 - 1.05 mg/dL    eGFR 45 (L) >60 mL/min/1.73m*2    Calcium 9.7 8.6 - 10.6 mg/dL    Albumin 3.3 (L) 3.4 - 5.0 g/dL    Alkaline Phosphatase 59 33 - 136 U/L    Total Protein 6.8 6.4 - 8.2 g/dL    AST 15 9 - 39 U/L    Bilirubin, Total 0.3 0.0 - 1.2 mg/dL    ALT 8 7 - 45 U/L   Troponin I, High Sensitivity   Result Value Ref Range    Troponin I, High Sensitivity (CMC) 26 0 - 34 ng/L   B-type natriuretic peptide   Result Value Ref Range     (H) 0 - 99 pg/mL   Troponin I, High Sensitivity   Result Value Ref Range    Troponin I, High Sensitivity (CMC) 28 0 - 34 ng/L   Coagulation Screen   Result Value Ref Range    Protime 12.2 9.8 - 12.8 seconds    INR 1.1 0.9 - 1.1    aPTT 28 27 - 38 seconds   CBC and Auto Differential   Result Value Ref Range    WBC 8.6 4.4 - 11.3 x10*3/uL    nRBC 0.0 0.0 - 0.0 /100 WBCs    RBC 4.09 4.00 - 5.20 x10*6/uL    Hemoglobin 12.2 12.0 - 16.0 g/dL    Hematocrit 38.2 36.0 - 46.0 %    MCV 93 80 - 100 fL    MCH 29.8 26.0 - 34.0 pg    MCHC 31.9 (L) 32.0 - 36.0 g/dL    RDW 13.4 11.5 - 14.5 %    Platelets 305 150 - 450 x10*3/uL    Neutrophils % 64.4 40.0 - 80.0 %    Immature Granulocytes %, Automated 0.4 0.0 - 0.9 %    Lymphocytes % 23.6 13.0 - 44.0 %    Monocytes % 9.1 2.0 - 10.0 %    Eosinophils % 2.0 0.0 - 6.0 %    Basophils % 0.5 0.0 - 2.0 %    Neutrophils Absolute 5.51 (H) 1.60 - 5.50 x10*3/uL    Immature Granulocytes Absolute, Automated 0.03 0.00 - 0.50 x10*3/uL    Lymphocytes Absolute 2.02 0.80 - 3.00 x10*3/uL    Monocytes Absolute 0.78 0.05 - 0.80 x10*3/uL    Eosinophils Absolute 0.17 0.00 - 0.40 x10*3/uL    Basophils Absolute 0.04 0.00 - 0.10 x10*3/uL   Renal function panel   Result Value Ref Range    Glucose 103  (H) 74 - 99 mg/dL    Sodium 139 136 - 145 mmol/L    Potassium 3.9 3.5 - 5.3 mmol/L    Chloride 102 98 - 107 mmol/L    Bicarbonate 24 21 - 32 mmol/L    Anion Gap 17 10 - 20 mmol/L    Urea Nitrogen 17 6 - 23 mg/dL    Creatinine 1.36 (H) 0.50 - 1.05 mg/dL    eGFR 41 (L) >60 mL/min/1.73m*2    Calcium 9.4 8.6 - 10.6 mg/dL    Phosphorus 3.8 2.5 - 4.9 mg/dL    Albumin 3.5 3.4 - 5.0 g/dL   Magnesium   Result Value Ref Range    Magnesium 1.93 1.60 - 2.40 mg/dL   Heparin Assay, UFH   Result Value Ref Range    Heparin Unfractionated 0.6 See Comment Below for Therapeutic Ranges IU/mL   CBC and Auto Differential   Result Value Ref Range    WBC 8.2 4.4 - 11.3 x10*3/uL    nRBC 0.0 0.0 - 0.0 /100 WBCs    RBC 3.65 (L) 4.00 - 5.20 x10*6/uL    Hemoglobin 11.0 (L) 12.0 - 16.0 g/dL    Hematocrit 33.5 (L) 36.0 - 46.0 %    MCV 92 80 - 100 fL    MCH 30.1 26.0 - 34.0 pg    MCHC 32.8 32.0 - 36.0 g/dL    RDW 13.4 11.5 - 14.5 %    Platelets 299 150 - 450 x10*3/uL    Neutrophils % 64.1 40.0 - 80.0 %    Immature Granulocytes %, Automated 0.4 0.0 - 0.9 %    Lymphocytes % 23.0 13.0 - 44.0 %    Monocytes % 9.5 2.0 - 10.0 %    Eosinophils % 2.6 0.0 - 6.0 %    Basophils % 0.4 0.0 - 2.0 %    Neutrophils Absolute 5.27 1.60 - 5.50 x10*3/uL    Immature Granulocytes Absolute, Automated 0.03 0.00 - 0.50 x10*3/uL    Lymphocytes Absolute 1.89 0.80 - 3.00 x10*3/uL    Monocytes Absolute 0.78 0.05 - 0.80 x10*3/uL    Eosinophils Absolute 0.21 0.00 - 0.40 x10*3/uL    Basophils Absolute 0.03 0.00 - 0.10 x10*3/uL   Renal function panel   Result Value Ref Range    Glucose 119 (H) 74 - 99 mg/dL    Sodium 138 136 - 145 mmol/L    Potassium 3.8 3.5 - 5.3 mmol/L    Chloride 105 98 - 107 mmol/L    Bicarbonate 25 21 - 32 mmol/L    Anion Gap 12 10 - 20 mmol/L    Urea Nitrogen 17 6 - 23 mg/dL    Creatinine 1.26 (H) 0.50 - 1.05 mg/dL    eGFR 45 (L) >60 mL/min/1.73m*2    Calcium 8.9 8.6 - 10.6 mg/dL    Phosphorus 3.6 2.5 - 4.9 mg/dL    Albumin 3.0 (L) 3.4 - 5.0 g/dL    Magnesium   Result Value Ref Range    Magnesium 1.82 1.60 - 2.40 mg/dL   Heparin Assay, UFH   Result Value Ref Range    Heparin Unfractionated 0.4 See Comment Below for Therapeutic Ranges IU/mL      ECG 12 lead    Result Date: 8/4/2024  Normal sinus rhythm Incomplete left bundle branch block Borderline ECG When compared with ECG of 15-ELIANE-2020 14:56, Incomplete left bundle branch block is now Present See ED provider note for full interpretation and clinical correlation Confirmed by Deborah Cadena ([id number]) on 8/4/2024 2:52:44 AM    CT angio chest for pulmonary embolism    Result Date: 8/3/2024  Interpreted By:  Sixto Sparks  and Toña Harris STUDY: CT ANGIO CHEST FOR PULMONARY EMBOLISM;  8/3/2024 6:11 pm   INDICATION: Signs/Symptoms:Rule out PE. sx   COMPARISON: CT chest 07/23/2024 and 01/30/2024   ACCESSION NUMBER(S): [id number]   ORDERING CLINICIAN: SHANTELL AL   TECHNIQUE: Helical data acquisition of the chest was obtained after intravenous administration of 90 mL of Omnipaque 350, as per PE protocol. Images were reformatted in coronal and sagittal planes. Axial and coronal maximum intensity projection (MIP) images were created and reviewed.   FINDINGS: POTENTIAL LIMITATIONS OF THE STUDY: None   HEART AND VESSELS: No discrete filling defects within the main pulmonary artery or its branches to segmental level. Please note that, assessment of subsegmental branches is limited and small peripheral emboli are not entirely excluded. Main pulmonary artery and its branches are normal in caliber.   The thoracic aorta normal in course and caliber.There is mild scattered atherosclerosis present, including calcified and noncalcified plaques.Although, the study is not tailored for evaluation of aorta, there is no definite evidence of acute aortic pathology. No coronary artery calcifications are seen. Please note, the study is not optimized for evaluation of coronary arteries.   The cardiac chambers are  not enlarged.   There is no pericardial effusion seen.   MEDIASTINUM AND DIANA, LOWER NECK AND AXILLA: The visualized thyroid gland is within normal limits. No evidence of thoracic lymphadenopathy by CT criteria. Small hiatal hernia.   LUNGS AND AIRWAYS: The trachea and central airways are patent. No endobronchial lesion is seen.   Similar multifocal mucous plugging as well as bronchiectasis within the right upper lobe, right middle lobe and lingula, when compared to prior CT 07/23/2024. Again there is multifocal tree-in-bud nodularity scattered throughout the left lung and right lower lobe. Again there is a cavitary right upper lobe nodule with internal calcifications, adjacent pleural thickening and satellite nodularity which measures 2.7 x 2.3 cm in the transverse and AP dimensions, not significantly changed when compared to recent prior CT 07/23/2024. No new areas of focal consolidation. No pleural effusion. No pneumothorax.     UPPER ABDOMEN: The visualized subdiaphragmatic structures demonstrate no remarkable findings.       CHEST WALL AND OSSEOUS STRUCTURES: Chest wall is within normal limits. No acute osseous pathology.There are no suspicious osseous lesions.       1. No evidence of acute pulmonary embolism to segmental level. 2. Redemonstration of multifocal mucous plugging, tree-in-bud nodularity and bronchiectasis/bronchiolitis which is favored to represent an ongoing infectious process, such as mycobacterium infection, not significantly changed from recent CT 07/23/2024. 3. Unchanged in the short-term interval right upper lobe cavitary lesion when compared to prior CT 07/23/2024, as before increased from January 20, 2024. Short-term follow-up chest CT or PET-CT is recommended.   I personally reviewed the images/study and I agree with the findings as stated by Steven Zavala MD (Radiology Resident). This study was interpreted at [facility], [place],  [place].   MACRO: None   Signed by: Sixto Sparks 8/3/2024 7:12 PM Dictation workstation:   [id number]    Vascular US upper extremity venous duplex left    Result Date: 8/3/2024  Interpreted By:  Lanny Haider and Tippareddy Charit STUDY: VASC US UPPER EXTREMITY VENOUS DUPLEX LEFT;  8/3/2024 3:34 pm   INDICATION: Signs/Symptoms:Evaluate PICC line for clot.   COMPARISON: None.   ACCESSION NUMBER(S): [id number]   ORDERING CLINICIAN: SHANTELL AL   TECHNIQUE: Vascular ultrasound of the  left upper extremity was performed. Evaluation was performed with grayscale, color, and spectral Doppler. When possible, compression views of the evaluated veins was also performed.   This examination was interpreted at [facility].   FINDINGS: Evaluation of the visualized portions of the  left internal jugular, innominate, subclavian, axillary, and brachial cephalic, and basilic veins was performed.   Partial compressibility of the axillary vein, brachial vein, and left basilic vein which may be secondary to the PICC line. Almost no flow is noted within the axillary vein. There is normal respiratory variation, normal compressibility, as well as normal color doppler signal in the remaining visualized vessels.       1.  Lack of flow within the left axillary vein with an intraluminal PICC, compatible with thrombosis. 2. Partial compressibility of the left brachial vein, and left basilic vein with intraluminal PICC. Persistent flow is noted within these veins, however a partially occlusive thrombus can not be excluded.     I personally reviewed the images/study and I agree with the findings as stated by Kolton Stokes MD. This study was interpreted at [place], [place].   MACRO: None   Signed by: Lanny Haider 8/3/2024 4:19 PM Dictation workstation:   [id number]    XR chest 2 views    Result Date: 8/3/2024  Interpreted By:  Sachin Mchugh and Bartolomei  Lucas Harris STUDY: XR CHEST 2 VIEWS;  8/3/2024 2:14 pm   INDICATION: Signs/Symptoms:cp.   COMPARISON: CT chest 07/23/2024 and chest radiograph 09/13/2019   ACCESSION NUMBER(S): [id number]   ORDERING CLINICIAN: MARTINA ROACH   FINDINGS: PA and lateral radiographs of the chest were provided.   Left upper extremity PICC line with distal tip projecting over the superior cavoatrial junction.   CARDIOMEDIASTINAL SILHOUETTE: Cardiomediastinal silhouette is stable in size and configuration.   LUNGS: Lungs are hyperinflated. Cavitary lesion within the right upper lobe measuring 2.6 cm in the craniocaudal dimension. Additional smaller cavitary lesions appear evident however immeasurable on radiograph and better evaluated on prior CT 07/23/2024. Coarse interstitial markings in keeping with chronic lung disease. No pleural effusion, focal consolidation or pneumothorax.   ABDOMEN: No remarkable upper abdominal findings.   BONES: No acute osseous changes.       1.  No evidence of acute cardiopulmonary process, however there are chronic lung changes, hyperinflation, and cavitary lesions which are better characterized on prior CT 07/23/2024.   I personally reviewed the images/study and I agree with the findings as stated by Steven Zavala MD (Radiology Resident). This study was interpreted at [facility], [place], [place].   MACRO: None   Signed by: Sachin Mchugh 8/3/2024 2:47 PM Dictation workstation:   [id number]    Bronchoscopy Tier 1; w BAL    Result Date: 7/31/2024  Table formatting from the original result was not included. Impression The pharynx, larynx, vocal cords, trachea, main julia, left lung and right lung appeared normal. Excessive and purulent secretions present in the right upper lobar bronchus; performed washing Bronchoalveolar lavage was performed in the RUL and the fluid appeared cloudy and purulent Dynamic airway collapse Findings The pharynx, larynx,  vocal cords, trachea, main julia, left lung and right lung appeared normal. Excessive and purulent secretions present in the right upper lobar bronchus; secretions were easily removed and the airway was cleared; performed washing Bronchoalveolar lavage was performed in the RUL with 80 mL of saline instilled and a total return of 12 mL. The fluid appeared cloudy and purulent. There was also around 10 ml recovered in the trap - total is close to 25 ml Dynamic airway collapse. In all bronchi with coughing noted diffusely Recommendation Await pathology results Indication Lung infiltrate Staff Staff Role Crissy Gil MD Proceduralist Medications See Anesthesia Record. Preprocedure A history and physical has been performed, and patient medication allergies have been reviewed. The patient's tolerance of previous anesthesia has been reviewed. The risks and benefits of the procedure and the sedation options and risks were discussed with the patient. All questions were answered and informed consent obtained. Details of the Procedure The patient underwent monitored anesthesia care, which was administered by an anesthesia professional. The patient's blood pressure, heart rate, level of consciousness, oxygen, respirations, ECG and ETCO2 were monitored throughout the procedure. The patient experienced no blood loss. The scope was introduced through the left naris. The procedure was not difficult. The patient tolerated the procedure well. There were no apparent adverse events. Events Procedure Events Event Event Time ENDO SCOPE IN TIME 7/31/2024 12:21 PM ENDO SCOPE IN TIME 7/31/2024 12:31 PM ENDO SCOPE OUT TIME 7/31/2024 12:44 PM Specimens ID Type Source Tests Collected by Time A :  Fluid BRONCHO-ALVEOLAR LAVAGE OF RIGHT UPPER LOBE AFB CULTURE/SMEAR, FUNGAL CULTURE/SMEAR, RESPIRATORY CULTURE/SMEAR Ravin Barajas RN 7/31/2024 1239 B :  Fluid BRONCHIAL WASH OF RIGHT UPPER LOBE AFB CULTURE/SMEAR, FUNGAL CULTURE/SMEAR, RESPIRATORY  CULTURE/SMEAR Ravin Barajas RN 7/31/2024 1242 Procedure Location [facility] [address] [facility] [address] [phone] Referring Provider Crissy Gil MD Procedure Provider Crissy Gil MD     OCT MACULA CIRRUS OU (BOTH EYES)    Result Date: 7/26/2024  Date of Procedure 7/26/2024. Interpretation Right Eye Findings include Negative for Intraretinal fluid, Subretinal fluid. Left Eye Findings include Intraretinal fluid, Vitreomacular traction. Interval Change Right Eye Stable. Left Eye Stable.     CT chest high resolution    Result Date: 7/23/2024  Interpreted By:  Wayne Post,  and Ino Craven STUDY: CT CHEST HIGH RESOLUTION;  7/23/2024 2:52 pm   INDICATION: Signs/Symptoms:monitoring lung nodules and bronchiectasis - Hx of MAC.   73-year-old female history CVD on IVIG, chronic bronchiectasis in recurrent sinal pulmonary infections. 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 CT January 20, 2024 with multifocal mucous plugging, bronchiectasis, bronchial wall thickening and mosaic attenuation with associated areas of tree-in-bud nodularity in the right lung and lingula raising concern for mycobacterium infection. CT at that time also demonstrated thickening of the walls of the pleural-based right upper lobe cavitating lesion also consistent with mycobacterium infection although short-term follow-up recommended.   COMPARISON: HR CT 01/30/2024.   ACCESSION NUMBER(S): [id number]   ORDERING CLINICIAN: CRISSY GIL   TECHNIQUE: Using helical multidetector technique, volumetric data acquisition of the chest was obtained without intravenous administration of contrast material under the high resolution chest CT protocol. Examination includes contiguous slices through the chest in supine positioning during inspiration, noncontiguous axial slices in supine positioning during expiration and prone positioning  during inspiration.   FINDINGS: LUNGS AND AIRWAYS: Similar moderate multifocal mucous plugging worst in the right upper lobe, right middle lobe and lingula. The trachea and central airways are patent. No endobronchial lesion is seen. Bronchiectasis/bronchiolectasis within the upper right upper lobe, right middle lobe and lingula.   Similar scattered multifocal tree-in-bud nodularity within the left lung and right lower lobe when compared to prior exam. Significant interval increase in right upper lobe tree-in-bud nodularity/bronchial wall thickening with interval increase in size of cavitating right upper lobe pulmonary nodule with internal calcification, adjacent pleural thickening and satellite nodules measuring up to 2.5 x 2.6 x 4.1 cm (series 502, image 82 and series 503, image 100). No pneumothorax or pleural effusion.   Expiratory imaging demonstrates no evidence of air-trapping. Prone imaging reveals improvement of bibasilar atelectasis and no evidence of pulmonary fibrosis.   MEDIASTINUM AND DIANA, LOWER NECK AND AXILLA: The visualized thyroid gland is within normal limits. No evidence of thoracic lymphadenopathy by CT criteria. Esophagus appears within normal limits as seen.   HEART AND VESSELS: The thoracic aorta normal in course and caliber.There is mild scattered calcified atherosclerosis present. Main pulmonary artery and its branches are normal in caliber. No coronary artery calcifications are seen. Please note, the study is not optimized for evaluation of coronary arteries. The cardiac chambers are not enlarged.Moderate calcification of the aortic root. There is no pericardial effusion seen.   UPPER ABDOMEN: The visualized subdiaphragmatic structures demonstrate no remarkable findings.   CHEST WALL AND OSSEOUS STRUCTURES: Chest wall is within normal limits. No acute osseous pathology.There are no suspicious osseous lesions.Extensive multilevel degenerative changes within visualized spine.       1.  Redemonstration right middle lobe and lingula multifocal mucous plugging, tree-in-bud nodularity and bronchiectasis/bronchiolectasis. Significant interval increase in right upper lobe tree-in-bud nodularity and bronchial wall thickening. Findings consistent worsening infection. Mycobacterium infection should be a consideration. Mucous that she had a this some some 2. Interval increase in size of right upper lobe cavitary, thick-walled pulmonary nodule with adjacent pleural thickening and satellite nodules. This may also represent worsening mycobacterium infection, although enlarging cavitary malignancy is not excluded.   I personally reviewed the images/study and I agree with the findings as stated by Dr. Herber Mckinnon. This study was interpreted at [facility], [place], [place].   Signed by: Wayne Post 7/23/2024 4:05 PM Dictation workstation:   [id number]          This patient has a central line   Reason for the central line remaining today?  Antibiotics through PICC line                 Assessment/Plan   Principal Problem:    Acute deep vein thrombosis (DVT) of axillary vein of left upper extremity (Multi)    Abad Yan is a 73-year-old woman with CVID (on IVIG q3 wks), chronic bronchiectasis, hx of pseudomonas lung colonization, history of MAC pulmonary infection (tx'ed in 2017, 2019), and recurrent sinopulmonary infections admitted for ROSA and LUE DVT which is likely catheter-related given patient is s/p placement of PICC line in LUE on 8/1/2024 for treatment of acute exacerbation of bronchiectasis. Outpatient workup for bronchiectasis exacerbation remarkable for CT chest on (7/23) with worsening tree-in-bud opacities and RUL infiltrates and outpatient bronchoscopy (Dr. Gil on 7/31) with purulent secretions in the RUL bronchus and RUL BAL with cloudy and purulent fluid. Continued cefepime which was started outpatient for treatment of pseudomonas lung colonization  and exacerbation of bronchiectasis (plan for 14 days of cefepime). PICC line is functional (per nursing, able to draw back blood and flush without resistance), so will keep PICC line in place for treatment with cefepime until 8/16. For LUE DVT, patient was started on heparin gtt in the ED. Transitioned to apixaban today.     Workup in the ED was also notable for an ROSA with Cr of 1.24 from baseline of .85. Etiology of ROSA could be prerenal due to patient reports of dehydration in setting of reduced PO intake or intrinsic due to recent initiation of cefepime. Will obtain UA and urine lytes to further evaluate cause. Patient received 1 L fluid bolus yesterday evening and will give additional 1 L fluid bolus today. Cefepime was also renally adjusted to 2 g q12h from PTA 2 g q8h.     Updates (8/4)  - Discontinue heparin  - Start apixaban 10 mg daily for 7 days, followed by 5 mg daily for a total of 3 months  - 1 L LR fluid bolus    #L axillary vein thrombus, likely due to PICC line   :: Vascular US showing lack of flow within the left axillary vein with an intraluminal PICC, compatible with thrombosis and partial compressibility of the left brachial vein, and left basilic vein with intraluminal PICC  :: Started on low intensity heparin gtt in the ED   - Discontinue heparin  - Start apixaban 10 mg daily for 7 days, followed by 5 mg daily for a total of 3 months     #Acute Bronchiectasis Excerebration   #Pseudomonas lung colonization   #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 Nadia Galicia for CVID - IVIG q3 weeks  :: Previously treated for Pseudomonas in 6/24 with levo and cipro  :: Bronch with Dr. Gil on 7/31 showed excessive purulent secretions in the right upper lobar bronchus, culture pending.   :: CT of the chest which showed worsening tree-in-bud opacities and infiltrate in the right upper lobe   :: started on Cefepime q8 via picc line by ID at [facility] for acute exacerbation  :: CT Angio neg for PE  - Continue  cefepime 2g q12H (8/1-) - dose renally adjusted for ROSA  - Bronch studies from 7/31:       - AFB: pending       - Fungal cx: pending        - Respiratory cx: abundant pseudomonas aeruginosa, abundant PMNs   - Continue chest physiotherapy, albuterol PRN     #ROSA   :: likely prerenal vs newly started on cefepime, baseline 0.8  :: s/p 1L LR on 8/3  - UA and urine lytes  - Daily RFP  - 1 L LR fluid bolus  - Avoid nephrotoxic agents   - Renally dose medications     #Hypothyroidism  - Continue PTA Synthroid 75 mcg - one pill daily except for 1.5 pills on Sunday    #Osteoarthritis, neck  - Acetaminophen 975 mg PO q6h PRN pain    #Anxiety and depression  - Continue PTA escitalopram 5 mg     #Restless leg syndrome   - Continue PTA ropinirole 4 mg qhs    F: PRN  E: PRN  N: Regular diet  A: Picc line  GI: None indicated  DVT prophylaxis: Heparin     Code status: Full (confirmed on admission)  Surrogate decision maker: Danilo () [phone]    Patient seen, examined and discussed with Dr. Ortiz.    Анна Markham, MS4

## 2024-08-04 NOTE — DISCHARGE INSTRUCTIONS
Dear Abad Yan,    You were admitted to [facility] from 8/3/2024 to 8/5/2024 for an acute kidney injury and a blood clot in a vein in your left arm. Based on the images we got of your chest and the veins in your arm as well as how the PICC line was working, you were able to continue to use the PICC line until you finish your course of antibiotics on 8/16/2024. Because your kidneys were not functioning as well as they normally do because you were dehydrated, we decreased how often you got the antibiotics. We communicated with Dr. Galicia at [facility] and she recommended continuing to give your antibiotics twice a day until she sees you virtually on August 8 and can check how your kidneys are working. You should go to a lab in 2 days ( lab nearest to you) to get your kidney numbers checked. I requested the numbers be faxed to Dr. Galicia. In the meantime, continue taking the cefepime 2x a day until Dr. Galicia can review your labs and let you know if you can take it how you were before.     To treat the blood clot, you were started on a blood thinner called Apixaban which you should continue to take for 3 months to help prevent another blood clot from forming. You should take 10mg twice a day for 6 more days and then take 5mg twice a day. Follow up with your provider for refills/determining the duration of time you should take the apixaban (eliquis).     Thank you for choosing  for your care,   It was a pleasure taking care of you,     Please follow-up with the following providers:   Dr. Galicia - virtual appointment on August 8    Future Appointments   Date Time Provider Department Center   8/6/2024 11:00 AM GISSELLE Li, CCC-A [id number] [facility]   8/14/2024 11:15 AM Deborah Weeks MD [id number] [facility]   9/5/2024 11:30 AM Eva De Paz MD [id number] [facility]   10/7/2024 11:00 AM [facility] AHU DEB 5 [id number] U Rad   3/17/2025 11:30 AM Chelsey Arroyo MD [id number] [facility]    3/18/2025  3:45 PM Lewis Mayers MD [id number] [facility]   5/13/2025  3:00 PM Deborah Weeks MD [id number] [facility]       Medication changes:   -START:  Apixaban 10 mg daily for a total of 7 days (through 8/10/2024) followed by apixaban 5 mg daily for 3 months  -CHANGE:  Take cefepime 2 g twice daily until 8/16/2024  -STOP: None

## 2024-08-04 NOTE — CARE PLAN
Problem: Respiratory  Goal: Clear secretions with interventions this shift  Outcome: Progressing  Goal: Minimize anxiety/maximize coping throughout shift  Outcome: Progressing  Goal: Minimal/no exertional discomfort or dyspnea this shift  Outcome: Progressing  Goal: No signs of respiratory distress (eg. Use of accessory muscles. Peds grunting)  Outcome: Progressing  Goal: Patent airway maintained this shift  Outcome: Progressing  Goal: Tolerate mechanical ventilation evidenced by VS/agitation level this shift  Outcome: Progressing  Goal: Tolerate pulmonary toileting this shift  Outcome: Progressing  Goal: Verbalize decreased shortness of breath this shift  Outcome: Progressing  Goal: Wean oxygen to maintain O2 saturation per order/standard this shift  Outcome: Progressing  Goal: Increase self care and/or family involvement in next 24 hours  Outcome: Progressing

## 2024-08-04 NOTE — CARE PLAN
The patient's goals for the shift include      The clinical goals for the shift include pt will be safe and free from injury by end of shift.

## 2024-08-04 NOTE — PROGRESS NOTES
Pharmacy Medication History Review    Abad Yan is a 73 y.o. female admitted for Acute deep vein thrombosis (DVT) of axillary vein of left upper extremity (Multi). Pharmacy reviewed the patient's [id number] medications and allergies for accuracy.    The list below reflects the updated PTA list. Comments regarding how patient may be taking medications differently can be found in the Admit Orders Activity  Prior to Admission Medications   Prescriptions Last Dose Informant   CALCIUM CARBONATE-VITAMIN D3 ORAL 2024 Self   Sig: Take 1 tablet by mouth once daily.   aflibercept (Eylea) 2 mg/0.05 mL intra-ocular injection 2024 Self   Si.05 mL (2 mg) by intravitreal route 1 time.   albuterol 2.5 mg /3 mL (0.083 %) nebulizer solution 8/3/2024 Self   Sig: Take 3 mL (2.5 mg) by nebulization 2 times a day.   azelastine (Astelin) 137 mcg (0.1 %) nasal spray 2024 Self   Sig: Administer 2 sprays into each nostril 2 times a day as needed.   budesonide (Pulmicort) 0.5 mg/2 mL nebulizer solution Past Week Self   Sig: Inhale 2 mL (0.5 mg) 2 times a day.   coenzyme Q-10 100 mg capsule Past Week Self   Sig: Take 1 capsule (100 mg) by mouth.   cranberry 500 mg capsule 8/3/2024 Self   Sig: Take 1 capsule by mouth once daily.   escitalopram (Lexapro) 5 mg tablet 2024 at night Self   Sig: Take one tablet qpm   estradiol (Estrace) 0.01 % (0.1 mg/gram) vaginal cream Past Week Self   Sig: Insert 0.25 Applicatorfuls (1 g) into the vagina 2 times a week.   famotidine (Pepcid) 20 mg tablet 8/3/2024 at morning Self   Sig: Take one tablet in empty stomach in amand one table in empty stomach in the afternoon   ferrous sulfate  mg ER tablet Past Week Self   Sig: Take 1 tablet by mouth once daily.   folic acid (Folvite) 1 mg tablet Past Week Self   Sig: Take 1 tablet (1 mg) by mouth once daily.   ibuprofen (Motrin) capsule Past Week Self   Sig: Take 3 capsules (600 mg) by mouth once daily as needed.    immune globulin, human, (Privigen) 10 % solution infusion 7/17/2024 Self   Sig: orally every 3 weeks   ipratropium (Atrovent) 42 mcg (0.06 %) nasal spray Past Week Self   Sig: Administer 2 sprays into each nostril 3 times a day.   levothyroxine (Synthroid, Levoxyl) 75 mcg tablet 8/3/2024 Self   Sig: TAKE ONE AND ONE-HALF TABLETS ON SUNDAY AND TAKE 1 TABLET THE OTHER 6 DAYS OF THE WEEK, 30 MINUTES PRIOR TO BREAKFAST (NOTE NEW DOSE)   Patient taking differently: Take 1 tablet (75 mcg) by mouth once daily in the morning. Take before meals. Take 1 tablet daily   magnesium oxide (Mag-Ox) 250 mg magnesium tablet 8/2/2024 Self   Sig: Take 1 tablet (250 mg) by mouth once daily.   multivitamin (Multiple Vitamins) tablet 8/2/2024 Self   Sig: Take 1 tablet by mouth once daily.   potassium chloride ER (Micro-K) 10 mEq ER capsule 8/2/2024 Self   Sig: TAKE 1 CAPSULE DAILY WITH FOOD   psyllium (Metamucil) 0.4 gram capsule 8/2/2024 Self   Sig: Take 1 capsule by mouth 4 times a day as needed.   rOPINIRole (Requip) 4 mg tablet 8/2/2024 at night Self   Sig: TAKE 1 TABLET DAILY AT BEDTIME   rosuvastatin (Crestor) 10 mg tablet 8/2/2024 Self   Sig: TAKE 1 TABLET DAILY   rosuvastatin 10 mg capsule, sprinkle  Self   Sig: Take 10 mg by mouth once daily.   Patient not taking: Reported on 7/30/2024   sodium chloride 3% nebulizer solution 8/2/2024 Self   Sig: Take 3 mL by nebulization 3 times a day.   tretinoin (Retin-A) 0.025 % cream 8/2/2024 at night Self   Sig: Apply topically once daily at bedtime. A pea-sized amount to the whole face, start every 2-3 nights and gradually increase to nightly      Facility-Administered Medications: None         The list below reflects the updated allergy list. Please review each documented allergy for additional clarification and justification.  Allergies  Reviewed by Josselyn Salmeron on 8/3/2024        Severity Reactions Comments    Citalopram Medium Hives     Doxepin Medium Hives     Fluoxetine Medium  "Hives     Nortriptyline Medium Hives     Sertraline Medium Hives             Medications ADDED:  None  Medications CHANGED:  Azelastine 137 mcg ( 0.1%) nasal spray - updated sig from \"none entered\" to \" Administer 2 sprays into each nostril 2 times a day as needed.\"  Cranberry 500 mg capsule - updated sig from \" none entered\" to \" Take 1 capsule by mouth once daily.\"    Magnesium Oxide 250 mg - updated sig from \" none entered\" to \" Take 1 tablet by mouth once daily.\"  Psyllium 0.4 gram capsule - updated sig from \" take by mouth.\"  To \"Take 1 capsule by mouth 4 times a day as needed.\"    Medications REMOVED:   Albuterol 90 mcg/actuation inhaler   Azelastine 205.5 mcg ( 0.15 %) spray , non aerosol       Patient accepts M2B at discharge. Pharmacy has been updated to [facility].    Sources used to complete the med history include :  OARRS - no recent hx found   Patient interview   Patient outpatient dispense [facility] Chart Review   Admission Med Rec Grid     Below are additional concerns with the patient's PTA list.    Patient reports that she is not taking all of the medications listed above in the \" Medications Removed\" column.     Patient states that she is taking Levothyroxine 75 mcg tablet differently than prescribed. Sig states to take 1 and 0.5 tablets on Sunday and 1 tablet the other 6 days of the week , 30 weeks prior to breakfast. Patient reports taking this medication once daily.     Per patient her last dose of Afilbercept 2 mg/ 0.05 ml intra-ocular injection was July 26th , 2024.     Per patient last dose of Immune Globulin ,human, ( Privigen) 10% solution infusion was July 17th , 2024. Patient states that her next dose is due for August 7th ,2024.     Patient was recently started on Ciprofloxacin Hcl 750 mg tablets for a 14 day therapy on July 27th , 2024 per patient this therapy is now completed and patient is no longer taking this medication.     Patient is a reliable historian and appears " compliant with all outpatient medications.     Josselyn Salmeron  Transitions of Care Pharmacy [facility]  Please reach out via Secure Chat for questions, or if no response call Your T[facility] or [facility]

## 2024-08-04 NOTE — CARE PLAN
The patient's goals for the shift include      The clinical goals for the shift include pt will be safe and free from injury by end of shift.      Problem: Respiratory  Goal: Clear secretions with interventions this shift  Outcome: Progressing  Goal: Minimize anxiety/maximize coping throughout shift  Outcome: Progressing  Goal: Minimal/no exertional discomfort or dyspnea this shift  Outcome: Progressing  Goal: No signs of respiratory distress (eg. Use of accessory muscles. Peds grunting)  Outcome: Progressing  Goal: Patent airway maintained this shift  Outcome: Progressing  Goal: Tolerate mechanical ventilation evidenced by VS/agitation level this shift  Outcome: Progressing  Goal: Tolerate pulmonary toileting this shift  Outcome: Progressing  Goal: Verbalize decreased shortness of breath this shift  Outcome: Progressing  Goal: Wean oxygen to maintain O2 saturation per order/standard this shift  Outcome: Progressing  Goal: Increase self care and/or family involvement in next 24 hours  Outcome: Progressing

## 2024-08-05 ENCOUNTER — PHARMACY VISIT (OUTPATIENT)
Dept: PHARMACY | Facility: CLINIC | Age: 74
End: 2024-08-05
Payer: COMMERCIAL

## 2024-08-05 VITALS
SYSTOLIC BLOOD PRESSURE: 128 MMHG | OXYGEN SATURATION: 96 % | HEIGHT: 67 IN | TEMPERATURE: 97.3 F | DIASTOLIC BLOOD PRESSURE: 75 MMHG | WEIGHT: 124 LBS | RESPIRATION RATE: 18 BRPM | HEART RATE: 69 BPM | BODY MASS INDEX: 19.46 KG/M2

## 2024-08-05 PROBLEM — N17.9: Status: ACTIVE | Noted: 2024-08-05

## 2024-08-05 PROBLEM — E86.1: Status: ACTIVE | Noted: 2024-08-05

## 2024-08-05 PROBLEM — Z79.01 ON APIXABAN THERAPY: Status: ACTIVE | Noted: 2024-08-05

## 2024-08-05 PROBLEM — J98.4 CAVITARY LESION OF LUNG: Status: ACTIVE | Noted: 2024-08-05

## 2024-08-05 PROBLEM — A49.8 PSEUDOMONAS AERUGINOSA INFECTION: Status: ACTIVE | Noted: 2023-09-13

## 2024-08-05 LAB
ALBUMIN SERPL BCP-MCNC: 2.9 G/DL (ref 3.4–5)
ANION GAP SERPL CALC-SCNC: 11 MMOL/L (ref 10–20)
BASOPHILS # BLD AUTO: 0.03 X10*3/UL (ref 0–0.1)
BASOPHILS NFR BLD AUTO: 0.5 %
BUN SERPL-MCNC: 14 MG/DL (ref 6–23)
CALCIUM SERPL-MCNC: 8.9 MG/DL (ref 8.6–10.6)
CHLORIDE SERPL-SCNC: 103 MMOL/L (ref 98–107)
CO2 SERPL-SCNC: 30 MMOL/L (ref 21–32)
CREAT SERPL-MCNC: 1.23 MG/DL (ref 0.5–1.05)
EGFRCR SERPLBLD CKD-EPI 2021: 46 ML/MIN/1.73M*2
EOSINOPHIL # BLD AUTO: 0.2 X10*3/UL (ref 0–0.4)
EOSINOPHIL NFR BLD AUTO: 3.1 %
ERYTHROCYTE [DISTWIDTH] IN BLOOD BY AUTOMATED COUNT: 13.4 % (ref 11.5–14.5)
FUNGUS SPEC CULT: NORMAL
FUNGUS SPEC FUNGUS STN: NORMAL
GLUCOSE SERPL-MCNC: 117 MG/DL (ref 74–99)
HCT VFR BLD AUTO: 32.9 % (ref 36–46)
HGB BLD-MCNC: 10.6 G/DL (ref 12–16)
IMM GRANULOCYTES # BLD AUTO: 0.01 X10*3/UL (ref 0–0.5)
IMM GRANULOCYTES NFR BLD AUTO: 0.2 % (ref 0–0.9)
LYMPHOCYTES # BLD AUTO: 1.52 X10*3/UL (ref 0.8–3)
LYMPHOCYTES NFR BLD AUTO: 23.3 %
MAGNESIUM SERPL-MCNC: 1.92 MG/DL (ref 1.6–2.4)
MCH RBC QN AUTO: 29.4 PG (ref 26–34)
MCHC RBC AUTO-ENTMCNC: 32.2 G/DL (ref 32–36)
MCV RBC AUTO: 91 FL (ref 80–100)
MONOCYTES # BLD AUTO: 0.71 X10*3/UL (ref 0.05–0.8)
MONOCYTES NFR BLD AUTO: 10.9 %
NEUTROPHILS # BLD AUTO: 4.05 X10*3/UL (ref 1.6–5.5)
NEUTROPHILS NFR BLD AUTO: 62 %
NRBC BLD-RTO: 0 /100 WBCS (ref 0–0)
PHOSPHATE SERPL-MCNC: 4.3 MG/DL (ref 2.5–4.9)
PLATELET # BLD AUTO: 262 X10*3/UL (ref 150–450)
POTASSIUM SERPL-SCNC: 4.1 MMOL/L (ref 3.5–5.3)
RBC # BLD AUTO: 3.61 X10*6/UL (ref 4–5.2)
SODIUM SERPL-SCNC: 140 MMOL/L (ref 136–145)
WBC # BLD AUTO: 6.5 X10*3/UL (ref 4.4–11.3)

## 2024-08-05 PROCEDURE — RXMED WILLOW AMBULATORY MEDICATION CHARGE

## 2024-08-05 PROCEDURE — 85025 COMPLETE CBC W/AUTO DIFF WBC: CPT

## 2024-08-05 PROCEDURE — 83735 ASSAY OF MAGNESIUM: CPT

## 2024-08-05 PROCEDURE — 2500000002 HC RX 250 W HCPCS SELF ADMINISTERED DRUGS (ALT 637 FOR MEDICARE OP, ALT 636 FOR OP/ED)

## 2024-08-05 PROCEDURE — 2500000004 HC RX 250 GENERAL PHARMACY W/ HCPCS (ALT 636 FOR OP/ED): Mod: JZ

## 2024-08-05 PROCEDURE — 2500000001 HC RX 250 WO HCPCS SELF ADMINISTERED DRUGS (ALT 637 FOR MEDICARE OP)

## 2024-08-05 PROCEDURE — 99238 HOSP IP/OBS DSCHRG MGMT 30/<: CPT | Performed by: INTERNAL MEDICINE

## 2024-08-05 PROCEDURE — 94640 AIRWAY INHALATION TREATMENT: CPT

## 2024-08-05 PROCEDURE — 80069 RENAL FUNCTION PANEL: CPT

## 2024-08-05 RX ORDER — CEFEPIME 1 G/50ML
2 INJECTION, SOLUTION INTRAVENOUS EVERY 12 HOURS
Start: 2024-08-05

## 2024-08-05 ASSESSMENT — PAIN DESCRIPTION - LOCATION: LOCATION: NECK

## 2024-08-05 ASSESSMENT — COGNITIVE AND FUNCTIONAL STATUS - GENERAL
DAILY ACTIVITIY SCORE: 24
MOBILITY SCORE: 24

## 2024-08-05 ASSESSMENT — ACTIVITIES OF DAILY LIVING (ADL): LACK_OF_TRANSPORTATION: NO

## 2024-08-05 ASSESSMENT — PAIN SCALES - GENERAL
PAINLEVEL_OUTOF10: 0 - NO PAIN
PAINLEVEL_OUTOF10: 0 - NO PAIN
PAINLEVEL_OUTOF10: 6

## 2024-08-05 NOTE — PROGRESS NOTES
ELIANE aYn is a 73 y.o. female on day 2 of admission presenting with Acute deep vein thrombosis (DVT) of axillary vein of left upper extremity (Multi).    Transitional Care Coordination Progress Note:  Patient discussed during interdisciplinary rounds.   Team members present: MD and TCC  Plan per Medical/Surgical team: Patient being treated for DVT and ROSA.  Payor: Medicare  Discharge disposition: Home   Potential Barriers: None  ADOD:   08/05/24  SHANTELL VO RN

## 2024-08-05 NOTE — DISCHARGE SUMMARY
Discharge Diagnosis  Principal Problem:    Acute deep vein thrombosis (DVT) of axillary vein of left upper extremity (Multi)  Active Problems:    Bronchiectasis with acute exacerbation (Multi)    Pseudomonas aeruginosa infection    Common variable immunodeficiency (Multi)    Cavitary lesion of lung    On apixaban therapy    Acute renal injury due to hypovolemia (CMS-HCC)    Issues Requiring Follow-Up  - Pseudomonas lung infection - follow up with Dr. Galicia at [facility]  - CVID - follow up with Dr. Padilla    Discharge Meds     Your medication list        START taking these medications        Instructions Last Dose Given Next Dose Due   apixaban 5 mg (74 tabs) tablet  Commonly known as: Eliquis      Take 2 tablets (10 mg) by mouth 2 times a day for 6 days, then take 1 tablet (5 mg) by mouth 2 times a day.       cefepime IV  Commonly known as: Maxipime      Infuse 100 mL (2 g) over 0.5 hours into a venous catheter every 12 hours. Take every 12 hours and follow up with your provider to determine schedule later.              CHANGE how you take these medications        Instructions Last Dose Given Next Dose Due   levothyroxine 75 mcg tablet  Commonly known as: Synthroid, Levoxyl  What changed: See the new instructions.      TAKE ONE AND ONE-HALF TABLETS ON SUNDAY AND TAKE 1 TABLET THE OTHER 6 DAYS OF THE WEEK, 30 MINUTES PRIOR TO BREAKFAST (NOTE NEW DOSE)       rosuvastatin 10 mg tablet  Commonly known as: Crestor  What changed: Another medication with the same name was removed. Continue taking this medication, and follow the directions you see here.      TAKE 1 TABLET DAILY              CONTINUE taking these medications        Instructions Last Dose Given Next Dose Due   albuterol 2.5 mg /3 mL (0.083 %) nebulizer solution      Take 3 mL (2.5 mg) by nebulization 2 times a day.       azelastine 137 mcg (0.1 %) nasal spray  Commonly known as: Astelin           budesonide 0.5 mg/2 mL nebulizer  solution  Commonly known as: Pulmicort           CALCIUM CARBONATE-VITAMIN D3 ORAL           coenzyme Q-10 100 mg capsule           cranberry 500 mg capsule           escitalopram 5 mg tablet  Commonly known as: Lexapro      Take one tablet qpm       estradiol 0.01 % (0.1 mg/gram) vaginal cream  Commonly known as: Estrace      Insert 0.25 Applicatorfuls (1 g) into the vagina 2 times a week.       Eylea 2 mg/0.05 mL intra-ocular injection  Generic drug: aflibercept           famotidine 20 mg tablet  Commonly known as: Pepcid      Take one tablet in empty stomach in amand one table in empty stomach in the afternoon       ferrous sulfate  mg ER tablet           folic acid 1 mg tablet  Commonly known as: Folvite           ibuprofen capsule  Commonly known as: Motrin           immune globulin (human) 10 % solution infusion  Commonly known as: Privigen           ipratropium 42 mcg (0.06 %) nasal spray  Commonly known as: Atrovent           magnesium oxide 250 mg magnesium tablet  Commonly known as: Mag-Ox           Multiple Vitamins tablet  Generic drug: multivitamin           potassium chloride ER 10 mEq ER capsule  Commonly known as: Micro-K      TAKE 1 CAPSULE DAILY WITH FOOD       psyllium 0.4 gram capsule  Commonly known as: Metamucil           rOPINIRole 4 mg tablet  Commonly known as: Requip      TAKE 1 TABLET DAILY AT BEDTIME       sodium chloride 3% nebulizer solution      Take 3 mL by nebulization 3 times a day.       tretinoin 0.025 % cream  Commonly known as: Retin-A      Apply topically once daily at bedtime. A pea-sized amount to the whole face, start every 2-3 nights and gradually increase to nightly                 Where to Get Your Medications        [facility]  [address], [address], [facility] [address]      Hours: 8AM to 6PM Mon-Fri, 8AM to 4PM Sat, 9AM to 1PM Sun Phone: [phone]   apixaban 5 mg (74 tabs) tablet       Information about  where to get these medications is not yet available    Ask your nurse or doctor about these medications  cefepime IV         Test Results Pending At Discharge  Final AFB and fungal cultures from bronchoalveolar lavage fluid (7/31/2024)      Hospital Course  Abad Yan is a 73-year-old woman with CVID (on IVIG q3 wks), chronic bronchiectasis, hx of pseudomonas lung colonization, history of MAC pulmonary infection (tx'ed in 2017, 2019), and recurrent sinopulmonary infections who presented to the [facility] ED for chest pressure. She was found to have LUE DVT in axillary vein which is likely catheter-related given patient was s/p placement of PICC line in LUE on 8/1/2024 for treatment of acute exacerbation of bronchiectasis. Outpatient workup for bronchiectasis exacerbation remarkable for CT chest on (7/23) with worsening tree-in-bud opacities and RUL infiltrates and outpatient bronchoscopy (Dr. Gil on 7/31) with purulent secretions in the RUL bronchus and RUL BAL with cloudy and purulent fluid. Continued cefepime which was started outpatient for treatment of pseudomonas lung colonization and exacerbation of bronchiectasis (plan for 14 days of cefepime). PICC line was functional (per nursing, able to draw back blood and flush without resistance), so kept PICC line in place for treatment with cefepime until 8/16. For LUE DVT, patient was started on heparin gtt in the ED. Transitioned to apixaban on hospital day 1.      Workup in the ED was also notable for an ROSA with Cr of 1.24 from baseline of .85. Obtained UA and urine lytes which suggested ROSA was prerenal. Patient received 1 L fluid bolus on day of admission and additional 1 L fluid bolus on hospital day 1. Cefepime was also renally adjusted to 2 g q12h from PTA 2 g q8h.     On the day of discharge, communicated with Dr. Galicia at [facility] who was treating patient's acute exacerbation of bronchiectasis and pseudomonas infection. She recommended continuing on  renally adjusted cefepime until she sees the patient virtually on August 8 and is able to check an RFP.     Pertinent Physical Exam At Time of Discharge  Constitutional: Well-developed female in no acute distress.  HEENT: NCAT. EOMI. No JVD  Respiratory: CTAB. No wheezes, crackles, or rhonchi. Normal respiratory effort on RA.  Cardiovascular: RRR, normal S1/S2, No murmurs, rubs, or gallops.   Abdominal: Soft, nondistended, nontender to palpation. No rebound or guarding  Neuro: Alert and oriented. Moving all extremities with no focal deficits  Extremities: WWP, no peripheral edema  Psych: Appropriate mood and affect.    Notable Laboratory Studies:  Susceptibility data from last 90 days.  Collected Specimen Info Organism Amikacin Aztreonam Cefepime Ceftazidime Ciprofloxacin Levofloxacin Piperacillin/Tazobactam Tobramycin   07/31/24 Fluid from BRONCHIAL WASH OF RIGHT UPPER LOBE Pseudomonas aeruginosa  R  R S S  R  R  S  I     Mixed Aerobic and Anaerobic Bacteria            07/31/24 Fluid from BRONCHO-ALVEOLAR LAVAGE OF RIGHT UPPER LOBE Pseudomonas aeruginosa  R  R S S  R  R  I  R     Mixed Aerobic and Anaerobic Bacteria            06/26/24 Fluid from SPUTUM Pseudomonas aeruginosa  R  S S   S  I  S  R     Normal throat zan             AFB Culture   Date Value Ref Range Status   07/31/2024   Preliminary    Culture in progress and will be examined weekly. A result will be issued either when positive or after 8 weeks incubation.   07/31/2024   Preliminary    Culture in progress and will be examined weekly. A result will be issued either when positive or after 8 weeks incubation.   06/10/2024   Preliminary    Culture in progress and will be examined weekly. A result will be issued either when positive or after 8 weeks incubation.     Fungal Culture/Smear   Date Value Ref Range Status   07/31/2024   Preliminary    Culture in progress, a report will be issued when positive or after 2 weeks of incubation.   07/31/2024    Preliminary    Culture in progress, a report will be issued when positive or after 2 weeks of incubation.     Urea Nitrogen   Date Value Ref Range Status   08/05/2024 14 6 - 23 mg/dL Final   08/04/2024 17 6 - 23 mg/dL Final   08/03/2024 17 6 - 23 mg/dL Final   08/03/2024 20 6 - 23 mg/dL Final   07/30/2024 17 6 - 23 mg/dL Final   02/21/2024 14 6 - 23 mg/dL Final     Creatinine   Date Value Ref Range Status   08/05/2024 1.23 (H) 0.50 - 1.05 mg/dL Final   08/04/2024 1.26 (H) 0.50 - 1.05 mg/dL Final   08/03/2024 1.36 (H) 0.50 - 1.05 mg/dL Final   08/03/2024 1.24 (H) 0.50 - 1.05 mg/dL Final   07/30/2024 0.85 0.50 - 1.05 mg/dL Final   02/21/2024 0.87 0.50 - 1.05 mg/dL Final     Outpatient Follow-Up  Future Appointments   Date Time Provider Department Center   8/6/2024 11:00 AM Natalia Manuel, GISSELLE, CCC-A [id number] [facility]   8/14/2024 11:15 AM Deborah Weeks MD [id number] [facility]   9/5/2024 11:30 AM Eva De Paz MD [id number] [facility]   [facility]   3/17/2025 11:30 AM Chelsey Arroyo MD [id number] [facility]   3/18/2025  3:45 PM Lewis Mayers MD [id number] [facility]   5/13/2025  3:00 PM MD Leanna [id number] [facility]

## 2024-08-05 NOTE — PROGRESS NOTES
08/05/24 0700   Discharge Planning   Living Arrangements Spouse/significant other   Support Systems Spouse/significant other   Assistance Needed no   Type of Residence Private residence   Number of Stairs to Enter Residence 0   Number of Stairs Within Residence 0   Do you have animals or pets at home? No   Who is requesting discharge planning? Provider   Home or Post Acute Services None   Expected Discharge Disposition Home   Does the patient need discharge transport arranged? No   Financial Resource Strain   How hard is it for you to pay for the very basics like food, housing, medical care, and heating? Not very   Housing Stability   In the last 12 months, was there a time when you were not able to pay the mortgage or rent on time? N   In the past 12 months, how many times have you moved where you were living? 1   At any time in the past 12 months, were you homeless or living in a shelter (including now)? N   Transportation Needs   In the past 12 months, has lack of transportation kept you from medical appointments or from getting medications? no   In the past 12 months, has lack of transportation kept you from meetings, work, or from getting things needed for daily living? No     Assessment Note:  Met with patient and Introduced myself as care coordinator and member of the Care Transitions team for discharge planning. Pt feels safe at home.   Transportation: MD and TCC  Pharmacy: [facility] or [facility] on [address] in [place]  DME: No  Previous home care: No  Falls: No  PCP: Eva De Paz- doctor  Dialysis: No    Address, phone number and emergency contact verified. All questions and concerns addressed. Will continue to follow patient for discharge needs.    Patient states she uses [facility] and has antibiotics set up at home already through her stay at [facility]. Confirmed with [facility] and they confirmed.

## 2024-08-05 NOTE — CARE PLAN
The patient's goals for the shift include    Problem: Respiratory  Goal: Clear secretions with interventions this shift  Outcome: Progressing  Goal: Minimize anxiety/maximize coping throughout shift  Outcome: Progressing  Goal: Minimal/no exertional discomfort or dyspnea this shift  Outcome: Progressing  Goal: No signs of respiratory distress (eg. Use of accessory muscles. Peds grunting)  Outcome: Progressing  Goal: Patent airway maintained this shift  Outcome: Progressing  Goal: Tolerate mechanical ventilation evidenced by VS/agitation level this shift  Outcome: Progressing  Goal: Tolerate pulmonary toileting this shift  Outcome: Progressing  Goal: Verbalize decreased shortness of breath this shift  Outcome: Progressing  Goal: Wean oxygen to maintain O2 saturation per order/standard this shift  Outcome: Progressing  Goal: Increase self care and/or family involvement in next 24 hours  Outcome: Progressing       The clinical goals for the shift include Pt will remain safe, HDS and free from injury and falls throughout this shift.

## 2024-08-06 ENCOUNTER — PATIENT OUTREACH (OUTPATIENT)
Dept: CARE COORDINATION | Facility: CLINIC | Age: 74
End: 2024-08-06
Payer: MEDICARE

## 2024-08-06 ENCOUNTER — TELEPHONE (OUTPATIENT)
Dept: PULMONOLOGY | Facility: HOSPITAL | Age: 74
End: 2024-08-06
Payer: MEDICARE

## 2024-08-06 NOTE — PROGRESS NOTES
"First attempt made to reach patient for transitions of care outreach and no contact made with patient. Spoke to patient  \" I already did this call earlier with someone else\". Patient stated she has her medications and understands her plan of care. Patient hung up. Patient can outreach me if she has any other concerns.     Discharge Diagnosis  Principal Problem:    Acute deep vein thrombosis (DVT) of axillary vein of left upper extremity (Multi)  Active Problems:    Bronchiectasis with acute exacerbation (Multi)    Pseudomonas aeruginosa infection    Common variable immunodeficiency (Multi)    Cavitary lesion of lung    On apixaban therapy    Acute renal injury due to hypovolemia (CMS-[facility])     Issues Requiring Follow-Up  - Pseudomonas lung infection - follow up with Dr. Galicia at [facility]  - CVID - follow up with Dr. Randy Hardwick , RN   Nurse Care Manager   [facility] Care Department   [phone]     "

## 2024-08-06 NOTE — TELEPHONE ENCOUNTER
Pt reached out and left a voicemail regarding needing a refill on her 3% normal saline nebulizer solution. Tried to return pt's call at [phone] regarding this matter, but was unsuccessful.  A detailed voicemail was left stating a refill was placed on 7/31/24 and sent to [facility] on [address]  in [place]. Pt was instructed to return the call at [phone] for any further questions or concerns.

## 2024-08-07 ENCOUNTER — LAB (OUTPATIENT)
Dept: LAB | Facility: LAB | Age: 74
End: 2024-08-07
Payer: MEDICARE

## 2024-08-07 DIAGNOSIS — J47.9 BRONCHIECTASIS WITHOUT ACUTE EXACERBATION (MULTI): ICD-10-CM

## 2024-08-07 DIAGNOSIS — M25.512 CHRONIC LEFT SHOULDER PAIN: ICD-10-CM

## 2024-08-07 DIAGNOSIS — G89.29 CHRONIC LEFT SHOULDER PAIN: ICD-10-CM

## 2024-08-07 DIAGNOSIS — Z22.39: ICD-10-CM

## 2024-08-07 LAB
ACID FAST STN SPEC: NORMAL
ALBUMIN SERPL BCP-MCNC: 3.6 G/DL (ref 3.4–5)
ANION GAP SERPL CALC-SCNC: 14 MMOL/L (ref 10–20)
BUN SERPL-MCNC: 19 MG/DL (ref 6–23)
CALCIUM SERPL-MCNC: 10 MG/DL (ref 8.6–10.6)
CHLORIDE SERPL-SCNC: 100 MMOL/L (ref 98–107)
CO2 SERPL-SCNC: 30 MMOL/L (ref 21–32)
CREAT SERPL-MCNC: 1.18 MG/DL (ref 0.5–1.05)
EGFRCR SERPLBLD CKD-EPI 2021: 48 ML/MIN/1.73M*2
GLUCOSE SERPL-MCNC: 87 MG/DL (ref 74–99)
MYCOBACTERIUM SPEC CULT: NORMAL
PHOSPHATE SERPL-MCNC: 4.2 MG/DL (ref 2.5–4.9)
POTASSIUM SERPL-SCNC: 4.7 MMOL/L (ref 3.5–5.3)
SODIUM SERPL-SCNC: 139 MMOL/L (ref 136–145)

## 2024-08-07 PROCEDURE — 80069 RENAL FUNCTION PANEL: CPT

## 2024-08-08 LAB
FUNGUS SPEC CULT: NORMAL
FUNGUS SPEC FUNGUS STN: NORMAL

## 2024-08-12 ENCOUNTER — TELEPHONE (OUTPATIENT)
Dept: PULMONOLOGY | Facility: HOSPITAL | Age: 74
End: 2024-08-12
Payer: MEDICARE

## 2024-08-12 LAB
FUNGUS SPEC CULT: NORMAL
FUNGUS SPEC FUNGUS STN: NORMAL

## 2024-08-12 NOTE — TELEPHONE ENCOUNTER
Per Dr. Gil, pt needs to hold vest treatment for a few days and then decrease pressure once she restarts. Spoke with pt and updated her on plan of care. She verbalized understanding.

## 2024-08-12 NOTE — TELEPHONE ENCOUNTER
Pt reached out with c/o leg and feet tingling while using her vest treatment. She sated that she was ok using her vest 2 x day, but when she increased it to 3 x day her feet and legs would tingle and cause slight discomfort. She mentioned that her left leg is worse than the right. During the day it lasts approximately 30 minutes after a treatment. She does her third treatment before bed and the tingling will last all night. She denies any numbness, pins and needles feeling. She stated she took Tylenol prior to the before bed treatment in hopes it would help, but it did not. She stated that she thinks she has arthritis in her lower back, but undiagnosed. She also mentioned a history of sciatica in her left leg. Dr. Gil was notified.

## 2024-08-14 ENCOUNTER — APPOINTMENT (OUTPATIENT)
Dept: DERMATOLOGY | Facility: CLINIC | Age: 74
End: 2024-08-14
Payer: MEDICARE

## 2024-08-14 DIAGNOSIS — L57.0 ACTINIC KERATOSIS: Primary | ICD-10-CM

## 2024-08-14 DIAGNOSIS — L82.0 BENIGN LICHENOID KERATOSIS: ICD-10-CM

## 2024-08-14 DIAGNOSIS — H01.9 DERMATITIS OF EYELIDS OF BOTH EYES, UNSPECIFIED TYPE: ICD-10-CM

## 2024-08-14 LAB
ACID FAST STN SPEC: NORMAL
MYCOBACTERIUM SPEC CULT: NORMAL

## 2024-08-14 PROCEDURE — 1160F RVW MEDS BY RX/DR IN RCRD: CPT | Performed by: DERMATOLOGY

## 2024-08-14 PROCEDURE — 99213 OFFICE O/P EST LOW 20 MIN: CPT | Performed by: DERMATOLOGY

## 2024-08-14 PROCEDURE — 1111F DSCHRG MED/CURRENT MED MERGE: CPT | Performed by: DERMATOLOGY

## 2024-08-14 PROCEDURE — 1159F MED LIST DOCD IN RCRD: CPT | Performed by: DERMATOLOGY

## 2024-08-14 PROCEDURE — 1157F ADVNC CARE PLAN IN RCRD: CPT | Performed by: DERMATOLOGY

## 2024-08-14 ASSESSMENT — ITCH NUMERIC RATING SCALE: HOW SEVERE IS YOUR ITCHING?: 0

## 2024-08-14 ASSESSMENT — DERMATOLOGY QUALITY OF LIFE (QOL) ASSESSMENT
RATE HOW BOTHERED YOU ARE BY SYMPTOMS OF YOUR SKIN PROBLEM (EG, ITCHING, STINGING BURNING, HURTING OR SKIN IRRITATION): 0 - NEVER BOTHERED
ARE THERE EXCLUSIONS OR EXCEPTIONS FOR THE QUALITY OF LIFE ASSESSMENT: NO
DATE THE QUALITY-OF-LIFE ASSESSMENT WAS COMPLETED: 67066
RATE HOW BOTHERED YOU ARE BY EFFECTS OF YOUR SKIN PROBLEMS ON YOUR ACTIVITIES (EG, GOING OUT, ACCOMPLISHING WHAT YOU WANT, WORK ACTIVITIES OR YOUR RELATIONSHIPS WITH OTHERS): 0 - NEVER BOTHERED
RATE HOW EMOTIONALLY BOTHERED YOU ARE BY YOUR SKIN PROBLEM (FOR EXAMPLE, WORRY, EMBARRASSMENT, FRUSTRATION): 0 - NEVER BOTHERED

## 2024-08-14 ASSESSMENT — DERMATOLOGY PATIENT ASSESSMENT
ARE YOU ON BIRTH CONTROL: NO
DO YOU USE SUNSCREEN: OCCASIONALLY
DO YOU HAVE IRREGULAR MENSTRUAL CYCLES: NO
DO YOU HAVE ANY NEW OR CHANGING LESIONS: NO
DO YOU USE A TANNING BED: NO
ARE YOU TRYING TO GET PREGNANT: NO
ARE YOU AN ORGAN TRANSPLANT RECIPIENT: NO
HAVE YOU HAD OR DO YOU HAVE VASCULAR DISEASE: NO
HAVE YOU HAD OR DO YOU HAVE A STAPH INFECTION: NO

## 2024-08-14 ASSESSMENT — PATIENT GLOBAL ASSESSMENT (PGA): PATIENT GLOBAL ASSESSMENT: PATIENT GLOBAL ASSESSMENT:  1 - CLEAR

## 2024-08-14 NOTE — PROGRESS NOTES
Subjective     ELIANE Yan is a 73 y.o. female who presents for the following: LN2 (Liquid Nitrogen) and Suspicious Skin Lesion (Face and lt wrist). Patient last seen 5/2024 for FBSE. At that visit, treated 2 actinic keratosis on right lower eyelid and left buccal cheek with LN2. Patient reports she feels residual scaling on the right eyelid. Also concerned about two new lesions on her left lower eyelid and left wrist.     Review of Systems:  No other skin or systemic complaints other than what is documented elsewhere in the note.    The following portions of the chart were reviewed this encounter and updated as appropriate:   Tobacco  Allergies  Meds  Problems  Med Hx  Surg Hx         Skin Cancer History  No skin cancer on file.      Specialty Problems          Dermatology Problems    Melanocytic nevi of trunk    Rhytidosis facialis        Objective   Well appearing patient in no apparent distress; mood and affect are within normal limits.    A focused skin examination was performed. All findings within normal limits unless otherwise noted below.    Assessment/Plan   1. Actinic keratosis (2)  Left Buccal Cheek, Right Lower Eyelid  Resolved.     -Reviewed two previous locations treated with LN2.   -No evidence of residual actinic keratosis.     Related Procedures  Follow Up In Dermatology - Established Patient    2. Dermatitis of eyelids of both eyes, unspecified type  Left Malar Cheek  Symmetric patches with fine white scale on bilateral medial lower eyelids    -Likely irritant contact dermatitis due to tears  -Patient denies use of eye drops   -Advised patient to use moisturizer to areas BID    3. Benign lichenoid keratosis  Left Wrist - Posterior  Pink macule    -Discussed the nature of the diagnosis  -Reassurance, recommend continued observation      RTC 5/2025 as previously scheduled for FBSE.     Missy Khan, DO     I saw and evaluated the patient. I personally obtained the key and critical  portions of the history and physical exam or was physically present for key and critical portions performed by the resident/fellow. I reviewed the resident/fellow's documentation and discussed the patient with the resident/fellow. I agree with the resident/fellow's medical decision making as documented in the note and made changes where appropriate.    Deborah Weeks MD

## 2024-08-15 PROBLEM — L82.1 OTHER SEBORRHEIC KERATOSIS: Status: RESOLVED | Noted: 2023-05-11 | Resolved: 2024-08-15

## 2024-08-15 PROBLEM — D22.62 MELANOCYTIC NEVI OF LEFT UPPER LIMB, INCLUDING SHOULDER: Status: RESOLVED | Noted: 2023-05-11 | Resolved: 2024-08-15

## 2024-08-15 PROBLEM — L81.4 OTHER MELANIN HYPERPIGMENTATION: Status: RESOLVED | Noted: 2023-05-11 | Resolved: 2024-08-15

## 2024-08-15 PROBLEM — D22.9 SKIN MOLE: Status: RESOLVED | Noted: 2023-09-13 | Resolved: 2024-08-15

## 2024-08-15 PROBLEM — D22.70 MELANOCYTIC NEVI OF LOWER LIMB, INCLUDING HIP: Status: RESOLVED | Noted: 2023-05-11 | Resolved: 2024-08-15

## 2024-08-15 PROBLEM — D18.01 HEMANGIOMA OF SKIN AND SUBCUTANEOUS TISSUE: Status: RESOLVED | Noted: 2023-05-11 | Resolved: 2024-08-15

## 2024-08-19 LAB
BACTERIA SPEC RESP CULT: ABNORMAL
BACTERIA SPEC RESP CULT: ABNORMAL
FUNGUS SPEC CULT: NORMAL
FUNGUS SPEC FUNGUS STN: NORMAL
GRAM STN SPEC: ABNORMAL
GRAM STN SPEC: ABNORMAL

## 2024-08-20 ENCOUNTER — OFFICE VISIT (OUTPATIENT)
Dept: PULMONOLOGY | Facility: HOSPITAL | Age: 74
End: 2024-08-20
Payer: MEDICARE

## 2024-08-20 VITALS
DIASTOLIC BLOOD PRESSURE: 73 MMHG | TEMPERATURE: 97.1 F | WEIGHT: 122 LBS | BODY MASS INDEX: 19.11 KG/M2 | OXYGEN SATURATION: 95 % | SYSTOLIC BLOOD PRESSURE: 124 MMHG | HEART RATE: 76 BPM

## 2024-08-20 DIAGNOSIS — I82.A12 DVT OF AXILLARY VEIN, ACUTE LEFT (MULTI): ICD-10-CM

## 2024-08-20 DIAGNOSIS — J47.1 BRONCHIECTASIS WITH ACUTE EXACERBATION (MULTI): ICD-10-CM

## 2024-08-20 DIAGNOSIS — D80.9 IMMUNOGLOBULIN DEFICIENCY (MULTI): ICD-10-CM

## 2024-08-20 DIAGNOSIS — A49.8 PSEUDOMONAS AERUGINOSA INFECTION: Primary | ICD-10-CM

## 2024-08-20 DIAGNOSIS — A31.0 MYCOBACTERIUM AVIUM-INTRACELLULARE INFECTION (MULTI): ICD-10-CM

## 2024-08-20 DIAGNOSIS — Z79.01 ON APIXABAN THERAPY: ICD-10-CM

## 2024-08-20 PROCEDURE — 1159F MED LIST DOCD IN RCRD: CPT | Performed by: INTERNAL MEDICINE

## 2024-08-20 PROCEDURE — 1157F ADVNC CARE PLAN IN RCRD: CPT | Performed by: INTERNAL MEDICINE

## 2024-08-20 PROCEDURE — 99213 OFFICE O/P EST LOW 20 MIN: CPT | Performed by: INTERNAL MEDICINE

## 2024-08-20 PROCEDURE — 1111F DSCHRG MED/CURRENT MED MERGE: CPT | Performed by: INTERNAL MEDICINE

## 2024-08-20 PROCEDURE — 1126F AMNT PAIN NOTED NONE PRSNT: CPT | Performed by: INTERNAL MEDICINE

## 2024-08-20 ASSESSMENT — LIFESTYLE VARIABLES
HOW OFTEN DO YOU HAVE SIX OR MORE DRINKS ON ONE OCCASION: NEVER
SKIP TO QUESTIONS 9-10: 1
AUDIT-C TOTAL SCORE: 0
HOW OFTEN DO YOU HAVE A DRINK CONTAINING ALCOHOL: NEVER
HOW MANY STANDARD DRINKS CONTAINING ALCOHOL DO YOU HAVE ON A TYPICAL DAY: PATIENT DOES NOT DRINK

## 2024-08-20 ASSESSMENT — PAIN SCALES - GENERAL: PAINLEVEL: 0-NO PAIN

## 2024-08-20 NOTE — PROGRESS NOTES
Subjective   Patient ID: Abad Yan is a 73 y.o. female who presents for Pt. here today for FUV    HPI  Last visit in this clinic was 07/30/24.    Abad Yan is a 73 y.o. 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 Given the resistance pattern of this strain IV therapy was given. A 14-day course of  cefepime. Her course was complicated acute left DVT of axillary/brachial vein + SVT basilic vein related to the PICC line. She is now on Eliquis for a total of 3 months    The patient reports they have been doing better. She has been staying active and can walk short distances without feeling SOB. She feels SOB going uphill. She has an intermittent mild cough with small amounts of sputum production that is not purulent and is without hemoptysis    Review of Systems  Review of Pertinent Systems:  Constitutional: no chills, no fever, much improved fatigue.  ENT: no nasal congestion, no hoarseness, no sore throat, no postnasal drip.  Cardiovascular: as per HPI   Respiratory: as per HPI  Gastrointestinal: no nausea and no vomiting. No diarrhea or constipation. No reflux.  Integumentary: no rashes.      Objective   Physical Exam  Blood pressure 124/73, pulse 76, temperature 36.2 °C (97.1 °F), weight 55.3 kg (122 lb), SpO2 95%, not currently breastfeeding.  GENERAL: Normal appearance. Well nourished. No respiratory distress. Frequent coughing   HEAD/SINUSES: No sinus tenderness.   OROPHARYNX: Moist mucosa, no thrush or lesions -   NECK: No JVD, midline trachea without stridor.   LYMPH NODES: None felt in the cervical, submandibular, or supraclavicular regions.   LUNGS: Clear lung fields anteriorly and posteriorly no wheezing or rhonchi  CARDIAC: Normal S1 and S2; no gallops, rubs or murmurs. Regular rate and  rhythm.   EXTREMITIES: No edema. Varicose veins noted  NEURO: Grossly normal mental status, CNs, reflexes, and motor strength.   SKIN: Skin turgor normal. No rashes or lesions.    PSYCH: Normal affect.       AVAILABLE DATA - this represents my personal interpretation.   (07/31/24) Bronchoscopy  IMPRESSION  The pharynx, larynx, vocal cords, trachea, main julia, left lung and right lung appeared normal.  Excessive and purulent secretions present in the right upper lobar bronchus; performed washing  Bronchoalveolar lavage was performed in the RUL and the fluid appeared cloudy and purulent  Dynamic airway collapse    (08/03/24) CT Angio Chest Radiology    IMPRESSION:  1. No evidence of acute pulmonary embolism to segmental level.  2. Redemonstration of multifocal mucous plugging, tree-in-bud nodularity and bronchiectasis/bronchiolitis which is favored to represent an ongoing infectious process, not significantly changed from recent CT 07/23/2024.  3. Unchanged in the short-term interval right upper lobe cavitary lesion when compared to prior CT 07/23/2024, as before increased from  January 20, 2024. Short-term follow-up chest CT or PET-CT is recommended.    Assessment/Plan   Diagnoses and all orders for this visit:  1. Pseudomonas aeruginosa infection  Affecting the lower respiratory tract with predominant abnormalities in the RUL.  - Respiratory Culture/Smear; Future  - AFB Culture/Smear; Future  - Follow Up In Pulmonology; Future  - CT chest high resolution; Future    2. Bronchiectasis with acute exacerbation (Multi) - secondary to the above listed pathogen  - Respiratory Culture/Smear; Future  - AFB Culture/Smear; Future  - Follow Up In Pulmonology; Future  - CT chest high resolution; Future    3. Mycobacterium avium-intracellulare infection (Multi) treated in 2017 and 2019. It is not clear if active or not. Bronchoscopic samples are smear negative cultures pending  - AFB Culture/Smear; Future  - Follow Up In  Pulmonology; Future  - CT chest high resolution; Future    4. DVT of axillary vein, acute left (Multi) in setting of PICC   By history and exam improved    5. On apixaban therapy  No active bleeding. It is to be maintained for 3 months since it is provoked.     6. Immunoglobulin deficiency (Multi)  On replacement therapy    The primary respiratory problem that we discussed is bronchiectasis and recent pseudomonas infection  Glad you are feeling better  Keep using 3% saline nebulizer just prior to the vest therapy after an albuterol treatment  Vest therapy three times a day  I ordered sputum culture cups just in case you have a relapse   Lets check a chest CT first week in October  Follow-up in this clinic in just after the chest CT in october  Scribe Attestation  By signing my name below, I, Juan C Jefferson   attest that this documentation has been prepared under the direction and in the presence of Crissy Gil MD.

## 2024-08-20 NOTE — PATIENT INSTRUCTIONS
Thank you for allowing me to participate in your health care today.    The primary respiratory problem that we discussed is bronchiectasis and recent pseudomonas infection  Glad you are feeling better  Keep using 3% saline nebulizer just prior to the vest therapy after an albuterol treatment  Vest therapy three times a day  I ordered sputum culture cups just in case you have a relapse   Lets check a chest CT first week in October  Follow-up in this clinic in just after the chest CT in october    Please call [phone] ([facility]) to get your tests scheduled.     Please call [phone] to schedule a follow up appointment with me.    Unless deemed urgent or emergent, the result(s) of any tests ordering during this clinic visit will be reviewed during your follow-up visit. Depending on the urgency of the result(s), I may call or send you a [employer] message.

## 2024-08-21 LAB
ACID FAST STN SPEC: NORMAL
ACID FAST STN SPEC: NORMAL
MYCOBACTERIUM SPEC CULT: NORMAL
MYCOBACTERIUM SPEC CULT: NORMAL

## 2024-08-22 ENCOUNTER — OFFICE VISIT (OUTPATIENT)
Dept: PRIMARY CARE | Facility: CLINIC | Age: 74
End: 2024-08-22
Payer: MEDICARE

## 2024-08-22 ENCOUNTER — PATIENT OUTREACH (OUTPATIENT)
Dept: CARE COORDINATION | Facility: CLINIC | Age: 74
End: 2024-08-22

## 2024-08-22 VITALS
SYSTOLIC BLOOD PRESSURE: 113 MMHG | BODY MASS INDEX: 19.34 KG/M2 | WEIGHT: 123.46 LBS | HEART RATE: 72 BPM | TEMPERATURE: 96.4 F | DIASTOLIC BLOOD PRESSURE: 74 MMHG | OXYGEN SATURATION: 97 % | RESPIRATION RATE: 16 BRPM

## 2024-08-22 DIAGNOSIS — K21.00 GASTROESOPHAGEAL REFLUX DISEASE WITH ESOPHAGITIS WITHOUT HEMORRHAGE: ICD-10-CM

## 2024-08-22 DIAGNOSIS — I82.A12 DVT OF AXILLARY VEIN, ACUTE LEFT (MULTI): Primary | ICD-10-CM

## 2024-08-22 DIAGNOSIS — I82.A12 ACUTE DEEP VEIN THROMBOSIS (DVT) OF AXILLARY VEIN OF LEFT UPPER EXTREMITY (MULTI): ICD-10-CM

## 2024-08-22 PROBLEM — E86.1: Status: RESOLVED | Noted: 2024-08-05 | Resolved: 2024-08-22

## 2024-08-22 PROBLEM — I95.9 HYPOTENSION DETERMINED BY EXAMINATION: Status: RESOLVED | Noted: 2024-03-14 | Resolved: 2024-08-22

## 2024-08-22 PROBLEM — R00.2 PALPITATIONS: Status: RESOLVED | Noted: 2023-09-13 | Resolved: 2024-08-22

## 2024-08-22 PROBLEM — N17.9: Status: RESOLVED | Noted: 2024-08-05 | Resolved: 2024-08-22

## 2024-08-22 PROBLEM — J01.91 ACUTE RECURRENT SINUSITIS: Status: RESOLVED | Noted: 2023-09-13 | Resolved: 2024-08-22

## 2024-08-22 PROCEDURE — 1159F MED LIST DOCD IN RCRD: CPT | Performed by: INTERNAL MEDICINE

## 2024-08-22 PROCEDURE — 1126F AMNT PAIN NOTED NONE PRSNT: CPT | Performed by: INTERNAL MEDICINE

## 2024-08-22 PROCEDURE — 1036F TOBACCO NON-USER: CPT | Performed by: INTERNAL MEDICINE

## 2024-08-22 PROCEDURE — 1157F ADVNC CARE PLAN IN RCRD: CPT | Performed by: INTERNAL MEDICINE

## 2024-08-22 PROCEDURE — 1111F DSCHRG MED/CURRENT MED MERGE: CPT | Performed by: INTERNAL MEDICINE

## 2024-08-22 PROCEDURE — 99213 OFFICE O/P EST LOW 20 MIN: CPT | Performed by: INTERNAL MEDICINE

## 2024-08-22 ASSESSMENT — PATIENT HEALTH QUESTIONNAIRE - PHQ9
1. LITTLE INTEREST OR PLEASURE IN DOING THINGS: NOT AT ALL
SUM OF ALL RESPONSES TO PHQ9 QUESTIONS 1 AND 2: 0
2. FEELING DOWN, DEPRESSED OR HOPELESS: NOT AT ALL

## 2024-08-22 ASSESSMENT — ENCOUNTER SYMPTOMS
SHORTNESS OF BREATH: 0
COUGH: 1
CHILLS: 0
DEPRESSION: 0
OCCASIONAL FEELINGS OF UNSTEADINESS: 0
ABDOMINAL DISTENTION: 0
LOSS OF SENSATION IN FEET: 0
FEVER: 0

## 2024-08-22 ASSESSMENT — PAIN SCALES - GENERAL: PAINLEVEL: 0-NO PAIN

## 2024-08-22 NOTE — PATIENT INSTRUCTIONS
Please move your meals earlier to allow stomach empty 3 hours prior to bed time, make sure each meal has protein and starch,  continue current meds, see vascular as advised, if heartburn persist , let us know, we will modify anti acids. Retrun as schedule

## 2024-08-22 NOTE — PROGRESS NOTES
Subjective   Patient ID: ELIANE Yan is a 73 y.o. female who presents for Hospital Follow-up.    HPI Pt has been on treatment with IV antibiotic from August 1 til August 15 with pick line on left arm for pseudomonas infection of lungs.  On August 3 she devoloped pleuritic pain and fatigue, went to ER, were she was diagnosed with left axillar DVT on pick line, her creatinine increased associated to antibiotic, dose was reduced, discharge home from ER on eliquis. Labs 10 days later showed normal creatinine.   She has seen pulmonologist who advised increased calories and tracking them as well as weight.   She added one extra meal at 8 pm and she is developed heartburn, needs advice.      Review of Systems   Constitutional:  Negative for chills and fever.   Respiratory:  Positive for cough. Negative for shortness of breath.    Gastrointestinal:  Negative for abdominal distention.   All other systems reviewed and are negative.      Objective   /74 (BP Location: Left arm, Patient Position: Sitting)   Pulse 72   Temp 35.8 °C (96.4 °F) (Temporal)   Resp 16   Wt 56 kg (123 lb 7.3 oz)   LMP  (LMP Unknown)   SpO2 97%   BMI 19.34 kg/m²     Physical Exam  Eyes - conjunctivae clear, PERRLA  HEENT - wearing mask, no tender sinus  Neck - no cervical lymphadenopathy, no thyromegaly  Axilla - no palpable lymphadenopathy  Cardiac- regular rate and rhythm, no murmurs, no carotid bruit, no JVP  Lung - clear to auscultation, no rales, no rhonchi, no wheezing  GI - normally active bowel sounds, non tender, non distended, no hepatosplenomegaly, no rebound  MSK - non deformities  Upper left extremity : scar from pick line, no erythema, tenderness,  no edema, good radial pulse  Neuro - non focal, oriented x 3  Skin - no bruises, no rashes  Psychiatric - pleasant, well groom, no hallucinations    Assessment/Plan   Problem List Items Addressed This Visit             ICD-10-CM    GERD (gastroesophageal reflux disease)  K21.9     Discussed modification of time of evening meals, pt will reach to us if symptoms continue for med adjustment         DVT of axillary vein, acute left (Multi) - Primary I82.A12     Continue  current eliquis, Pending vascular consult next week         Relevant Medications    apixaban (Eliquis) 5 mg (74 tabs) tablet    Other Relevant Orders    Referral to Vascular Medicine

## 2024-08-22 NOTE — ASSESSMENT & PLAN NOTE
Discussed modification of time of evening meals, pt will reach to us if symptoms continue for med adjustment

## 2024-08-26 ENCOUNTER — OFFICE VISIT (OUTPATIENT)
Dept: CARDIOLOGY | Facility: HOSPITAL | Age: 74
End: 2024-08-26
Payer: MEDICARE

## 2024-08-26 ENCOUNTER — LAB (OUTPATIENT)
Dept: LAB | Facility: LAB | Age: 74
End: 2024-08-26
Payer: MEDICARE

## 2024-08-26 VITALS
DIASTOLIC BLOOD PRESSURE: 72 MMHG | BODY MASS INDEX: 19.43 KG/M2 | SYSTOLIC BLOOD PRESSURE: 119 MMHG | HEART RATE: 89 BPM | OXYGEN SATURATION: 96 % | HEIGHT: 67 IN | WEIGHT: 123.8 LBS

## 2024-08-26 DIAGNOSIS — I82.612 ACUTE THROMBOSIS OF LEFT BASILIC VEIN: Primary | ICD-10-CM

## 2024-08-26 DIAGNOSIS — I82.A12 DVT OF AXILLARY VEIN, ACUTE LEFT (MULTI): ICD-10-CM

## 2024-08-26 LAB
ANION GAP SERPL CALC-SCNC: 13 MMOL/L (ref 10–20)
BUN SERPL-MCNC: 16 MG/DL (ref 6–23)
CALCIUM SERPL-MCNC: 9.4 MG/DL (ref 8.6–10.3)
CHLORIDE SERPL-SCNC: 102 MMOL/L (ref 98–107)
CO2 SERPL-SCNC: 27 MMOL/L (ref 21–32)
CREAT SERPL-MCNC: 0.78 MG/DL (ref 0.5–1.05)
EGFRCR SERPLBLD CKD-EPI 2021: 79 ML/MIN/1.73M*2
ERYTHROCYTE [DISTWIDTH] IN BLOOD BY AUTOMATED COUNT: 13.8 % (ref 11.5–14.5)
GLUCOSE SERPL-MCNC: 113 MG/DL (ref 74–99)
HCT VFR BLD AUTO: 35.4 % (ref 36–46)
HGB BLD-MCNC: 11.4 G/DL (ref 12–16)
MCH RBC QN AUTO: 29.7 PG (ref 26–34)
MCHC RBC AUTO-ENTMCNC: 32.2 G/DL (ref 32–36)
MCV RBC AUTO: 92 FL (ref 80–100)
NRBC BLD-RTO: 0 /100 WBCS (ref 0–0)
PLATELET # BLD AUTO: 341 X10*3/UL (ref 150–450)
POTASSIUM SERPL-SCNC: 4.3 MMOL/L (ref 3.5–5.3)
RBC # BLD AUTO: 3.84 X10*6/UL (ref 4–5.2)
SODIUM SERPL-SCNC: 138 MMOL/L (ref 136–145)
WBC # BLD AUTO: 8.7 X10*3/UL (ref 4.4–11.3)

## 2024-08-26 PROCEDURE — 1157F ADVNC CARE PLAN IN RCRD: CPT | Performed by: INTERNAL MEDICINE

## 2024-08-26 PROCEDURE — 80048 BASIC METABOLIC PNL TOTAL CA: CPT

## 2024-08-26 PROCEDURE — 1036F TOBACCO NON-USER: CPT | Performed by: INTERNAL MEDICINE

## 2024-08-26 PROCEDURE — 99214 OFFICE O/P EST MOD 30 MIN: CPT | Performed by: INTERNAL MEDICINE

## 2024-08-26 PROCEDURE — 99204 OFFICE O/P NEW MOD 45 MIN: CPT | Performed by: INTERNAL MEDICINE

## 2024-08-26 PROCEDURE — 85027 COMPLETE CBC AUTOMATED: CPT

## 2024-08-26 PROCEDURE — 1159F MED LIST DOCD IN RCRD: CPT | Performed by: INTERNAL MEDICINE

## 2024-08-26 PROCEDURE — 1111F DSCHRG MED/CURRENT MED MERGE: CPT | Performed by: INTERNAL MEDICINE

## 2024-08-26 ASSESSMENT — ENCOUNTER SYMPTOMS
SHORTNESS OF BREATH: 0
ABDOMINAL PAIN: 0
CHEST TIGHTNESS: 0
PALPITATIONS: 0
WHEEZING: 0

## 2024-08-26 NOTE — PATIENT INSTRUCTIONS
Continue Eliquis 5mg twice daily for full 3 months.  Ordered blood work - we will reach out with results.

## 2024-08-26 NOTE — LETTER
August 26, 2024     Eva De Paz MD  [facility]  [address]   [place] [address]    Patient: ABAD Yan   YOB: 1950   Date of Visit: 8/26/2024       Dear Dr. Eva De Paz MD:    Thank you for referring ABAD Yan to me for evaluation. Below are my notes for this consultation.  If you have questions, please do not hesitate to call me. I look forward to following your patient along with you.       Sincerely,     Shawna Jamil MD      CC: No Recipients  ______________________________________________________________________________________    08/26/24    Vascular Medicine Consultation    Referring  Eva De Paz MD  [facility]  [address] [place] [address]    Subjective  Patient ID: ABAD Yan is a 73 y.o. female who presents for Dvt.    HPI   Abad Yan is a 72 yo female with hx of CVID (on IVIG q3 weeks),  chronic bronchietasis, hx of pseudomonas lung colonization and MAC here for anticoagulation recommendation after she was found to have a picc line related VTE in the left brachial, and left basilic vein. She states she was seen by her pulmonologist for bronchiectasis exacerbation and had a bronchoscopy on 7/31 and a picc line was placed on 8/1 for IV abx which she needed for 2 weeks. She reports on 8/3 she developed chest pressure which prompted her to be seen in the ER. CTA chest was done and no PE was noted in the lungs.  She was found to have LUE DVT in the left brachial, and left basilic vein. She was started on IV Heparin while inpatient and was transitioned to Apixaban 5 mg BID on discharge (first dose 8/5). She has been taking her Apixaban as directed, however she believes that she might have missed few doses initially.   She denies any prior hx of DVT, VTE, aneurysms, CAD, strokes. Denies any left arm pain or swelling. Reports that  "the picc line was functional and she continued to use it for IV Abx for 2 weeks. Her picc line was eventually removed on 8/16. She denies any abnormal bleeding while on Eliquis.     Fhx:  No family hx of DVT/VTE    Social hx:  Former smoker (smoked from age [age])  She is up to date with her age/gender appropriate cancer screening.       Review of Systems   Respiratory:  Negative for chest tightness, shortness of breath and wheezing.    Cardiovascular:  Negative for chest pain, palpitations and leg swelling.   Gastrointestinal:  Negative for abdominal pain.   All other systems reviewed and are negative.      Objective  /72 (BP Location: Left arm, Patient Position: Sitting, BP Cuff Size: Adult)   Pulse 89   Ht 1.702 m (5' 7\")   Wt 56.2 kg (123 lb 12.8 oz)   LMP  (LMP Unknown)   SpO2 96%   BMI 19.39 kg/m²     Physical Exam  Constitutional:       General: She is not in acute distress.     Appearance: Normal appearance. She is not ill-appearing.   Neck:      Vascular: No carotid bruit.   Cardiovascular:      Rate and Rhythm: Normal rate and regular rhythm.      Pulses: Normal pulses.      Heart sounds: Normal heart sounds. No murmur heard.  Pulmonary:      Effort: Pulmonary effort is normal. No respiratory distress.      Breath sounds: Normal breath sounds. No wheezing.   Abdominal:      General: Abdomen is flat. Bowel sounds are normal.      Palpations: Abdomen is soft.      Tenderness: There is no abdominal tenderness.   Musculoskeletal:         General: No swelling or tenderness.      Comments: Varicose veins in bilateral Legs       IMAGING:  Vascular US left upper extremity 8/3/24:  IMPRESSION:  1.  Lack of flow within the left axillary vein with an intraluminal  PICC, compatible with thrombosis.  2. Partial compressibility of the left brachial vein, and left  basilic vein with intraluminal PICC. Persistent flow is noted within  these veins, however a partially occlusive thrombus can not " be  excluded.    Assessment/Plan    Abad Yan is a 74 yo female with hx of CVID (on IVIG q3 weeks),  chronic bronchietasis, hx of pseudomonas lung colonization and MAC here for anticoagulation recommendation after she was found to have a picc line related VTE in the left axillary and brachial DVT and left basilic vein SVT. Her Picc line was reoved on 8/16 after completion of abx. Given the provoked nature of her clot which was in the setting of a PICC line, she needs to remain on Eliquis for 3 months. She will obtain blood work today, CBC and BMP to ensure her hgb and renal function have remained stable while on anticoagulation.  If her pulmonologist determines that she needs a port or a picc line in the future, we can consider low dose of AC (Eliquis 2.5 mg or Lovenox subcutaneous) for line-associated VTE prophylaxis.   She will follow up with  in 3 months.     Diagnoses and all orders for this visit:  DVT of axillary/brachial vein + SVT basilic vein, acute left (Multi)  -     Basic metabolic panel; Future  -     CBC; Future    Patient was seen with the attending, Dr. Jamil,    Kayla Bowling MD   Fellow        08/26/24  4:59 PM    Vascular Medicine    I saw and evaluated the patient. I personally obtained the key and critical portions of the history and physical exam or was physically present for key and critical portions performed by the resident/fellow. I reviewed the resident/fellow's documentation and discussed the patient with the resident/fellow. I agree with the resident/fellow's medical decision making as documented in the note with the exception/addition of the following:    I saw and examined Ms. Karan Yan and reviewed her duplex. As above, no prior hx of VTE or family hx of same. She has PICC line related basilic SVT with brachial/axillary DVT. Line is out and she is on Eliquis. As above, recommend 3 months of therapy; reviewed need to avoid any missed doses and some strategies  to help with this.  After completion of Rx, if another line needs to be placed for Rx would have her on either Eliquis 2.5 mg BID or enoxaparin 40 mg QD for prevention of another event. No indication for thrombophilia testing.    [facility] 11.2024. interval follow up on CBC and BMP results.    Shawna Jamil MD

## 2024-08-28 PROBLEM — D80.9 IMMUNOGLOBULIN DEFICIENCY (MULTI): Status: ACTIVE | Noted: 2024-08-28

## 2024-08-30 ENCOUNTER — TELEPHONE (OUTPATIENT)
Dept: PRIMARY CARE | Facility: CLINIC | Age: 74
End: 2024-08-30
Payer: MEDICARE

## 2024-09-03 ENCOUNTER — TELEPHONE (OUTPATIENT)
Dept: PULMONOLOGY | Facility: HOSPITAL | Age: 74
End: 2024-09-03
Payer: MEDICARE

## 2024-09-03 DIAGNOSIS — E78.5 DYSLIPIDEMIA: Primary | ICD-10-CM

## 2024-09-03 DIAGNOSIS — J47.9 BRONCHIECTASIS WITHOUT ACUTE EXACERBATION (MULTI): ICD-10-CM

## 2024-09-03 DIAGNOSIS — E55.9 VITAMIN D DEFICIENCY, UNSPECIFIED: ICD-10-CM

## 2024-09-03 DIAGNOSIS — Z86.39 HISTORY OF HYPERGLYCEMIA: ICD-10-CM

## 2024-09-03 DIAGNOSIS — E03.9 PRIMARY HYPOTHYROIDISM: ICD-10-CM

## 2024-09-03 NOTE — TELEPHONE ENCOUNTER
Pt reached out needing a refill on her sodium chloride 3% nebulizer solution. Refill order placed and routed to Dr. Gil to sign. Pt verified pharmacy.

## 2024-09-04 ENCOUNTER — PATIENT OUTREACH (OUTPATIENT)
Dept: CARE COORDINATION | Facility: CLINIC | Age: 74
End: 2024-09-04
Payer: MEDICARE

## 2024-09-04 LAB
ACID FAST STN SPEC: NORMAL
MYCOBACTERIUM SPEC CULT: NORMAL

## 2024-09-04 NOTE — PROGRESS NOTES
Outreach call to patient to check in 30 days after hospital discharge to support smooth transition of care. Patient currently has a sinus infection on ABX at this time. Doing well overall. No other questions on this date has follow up with Eva De Paz MD tomorrow.       WALLY

## 2024-09-05 ENCOUNTER — APPOINTMENT (OUTPATIENT)
Dept: PRIMARY CARE | Facility: CLINIC | Age: 74
End: 2024-09-05
Payer: MEDICARE

## 2024-09-05 VITALS
SYSTOLIC BLOOD PRESSURE: 124 MMHG | DIASTOLIC BLOOD PRESSURE: 60 MMHG | RESPIRATION RATE: 18 BRPM | HEART RATE: 78 BPM | BODY MASS INDEX: 19.58 KG/M2 | OXYGEN SATURATION: 99 % | WEIGHT: 125 LBS

## 2024-09-05 DIAGNOSIS — E78.5 DYSLIPIDEMIA: ICD-10-CM

## 2024-09-05 DIAGNOSIS — F33.9 DEPRESSION, RECURRENT (CMS-HCC): ICD-10-CM

## 2024-09-05 DIAGNOSIS — D50.8 OTHER IRON DEFICIENCY ANEMIA: Primary | ICD-10-CM

## 2024-09-05 DIAGNOSIS — J47.0 BRONCHIECTASIS WITH ACUTE LOWER RESPIRATORY INFECTION (MULTI): ICD-10-CM

## 2024-09-05 DIAGNOSIS — E03.9 PRIMARY HYPOTHYROIDISM: ICD-10-CM

## 2024-09-05 DIAGNOSIS — E03.9 HYPOTHYROIDISM, UNSPECIFIED TYPE: ICD-10-CM

## 2024-09-05 PROBLEM — D50.9 IRON DEFICIENCY ANEMIA: Status: ACTIVE | Noted: 2024-09-05

## 2024-09-05 PROBLEM — E78.00 HYPERCHOLESTEREMIA: Status: ACTIVE | Noted: 2024-09-05

## 2024-09-05 LAB
ACID FAST STN SPEC: NORMAL
MYCOBACTERIUM SPEC CULT: NORMAL

## 2024-09-05 PROCEDURE — 99214 OFFICE O/P EST MOD 30 MIN: CPT | Performed by: INTERNAL MEDICINE

## 2024-09-05 PROCEDURE — 1036F TOBACCO NON-USER: CPT | Performed by: INTERNAL MEDICINE

## 2024-09-05 PROCEDURE — 1157F ADVNC CARE PLAN IN RCRD: CPT | Performed by: INTERNAL MEDICINE

## 2024-09-05 PROCEDURE — 1159F MED LIST DOCD IN RCRD: CPT | Performed by: INTERNAL MEDICINE

## 2024-09-05 RX ORDER — ROSUVASTATIN CALCIUM 5 MG/1
TABLET, COATED ORAL
Qty: 90 TABLET | Refills: 1 | Status: SHIPPED | OUTPATIENT
Start: 2024-09-05

## 2024-09-05 RX ORDER — LEVOTHYROXINE SODIUM 75 UG/1
75 TABLET ORAL
Qty: 90 TABLET | Refills: 1 | Status: SHIPPED | OUTPATIENT
Start: 2024-09-05

## 2024-09-05 RX ORDER — ROSUVASTATIN CALCIUM 10 MG/1
10 TABLET, COATED ORAL DAILY
Qty: 90 TABLET | Refills: 1 | Status: SHIPPED | OUTPATIENT
Start: 2024-09-05 | End: 2024-09-05

## 2024-09-05 RX ORDER — ESCITALOPRAM OXALATE 5 MG/1
TABLET ORAL
Qty: 90 TABLET | Refills: 1 | Status: SHIPPED | OUTPATIENT
Start: 2024-09-05

## 2024-09-05 ASSESSMENT — ENCOUNTER SYMPTOMS
DIAPHORESIS: 0
ABDOMINAL PAIN: 0
DEPRESSION: 0
FATIGUE: 0
LOSS OF SENSATION IN FEET: 0
OCCASIONAL FEELINGS OF UNSTEADINESS: 0
SLEEP DISTURBANCE: 0
CHILLS: 0

## 2024-09-05 NOTE — PROGRESS NOTES
Subjective   Patient ID: ELIANE Yan is a 73 y.o. female who presents for Follow-up. Mood, thyroid, cholesterol.    HPI Her apetite is low, eats mostly because of her meds, however she is more motivated last couple of weeks on eating more protein.  She needs refills. Blood work pending, taking iron for years, her hemoglobin is slight lower during hospitalization.  She feels challenge by so many medical issues, but she does not feel depressed  She is in augmentin since 48 h for sinus infection. Continues 3 hours / day respiratory therapy and nasal irrigations.   with her in visit, question about expectation of hospitalization with current condition, amount of hours she invested daily in her medical care.    Review of Systems   Constitutional:  Negative for chills, diaphoresis and fatigue.   Cardiovascular:  Negative for chest pain.   Gastrointestinal:  Negative for abdominal pain.   Psychiatric/Behavioral:  Negative for sleep disturbance.    All other systems reviewed and are negative.      Objective   /60 (BP Location: Left arm, Patient Position: Sitting)   Pulse 78   Resp 18   Wt 56.7 kg (125 lb)   LMP  (LMP Unknown)   SpO2 99%   BMI 19.58 kg/m²     Physical Exam  Intermitent wet cough  Eyes - conjunctivae clear, PERRLA  HEENT - maxilar sinus tenderness  Neck - no cervical lymphadenopathy, no thyromegaly  Cardiac- regular rate and rhythm, no murmurs,   Lung - clear to auscultation, no rales, no rhonchi, no wheezing  GI - normally active bowel sounds, non tender, non distended, no hepatosplenomegaly, no rebound  MSK - non deformities  Extremities - wearing compressin stockings  Neuro - non focal, oriented x 3  Skin - no bruises, no rashes  Psychiatric - pleasant, well groom, no hallucinations    Assessment/Plan   Problem List Items Addressed This Visit             ICD-10-CM    Depression, recurrent (CMS-HCC) F33.9     Continue current dose of escitalopram 5         Relevant Medications     escitalopram (Lexapro) 5 mg tablet    Dyslipidemia E78.5     Adjust with new lipid         Relevant Medications    rosuvastatin (Crestor) 5 mg tablet    Hypothyroidism E03.9     Tsh pending to adjust dose         Relevant Medications    levothyroxine (Synthroid, Levoxyl) 75 mcg tablet    Iron deficiency anemia - Primary D50.9    Relevant Orders    Iron and TIBC     Other Visit Diagnoses         Codes    Bronchiectasis with acute lower respiratory infection (Multi)     J47.0    Relevant Orders    CBC and Auto Differential    BUN    Creatinine

## 2024-09-05 NOTE — PATIENT INSTRUCTIONS
Continue working on protein in your diet, return fasting to lab soon and call for results,ontinue current mood medication, As discussed with you and your  you can consider second opionio of current medical interventions.  return here in December.

## 2024-09-06 ENCOUNTER — LAB (OUTPATIENT)
Dept: LAB | Facility: LAB | Age: 74
End: 2024-09-06
Payer: MEDICARE

## 2024-09-06 DIAGNOSIS — E03.9 PRIMARY HYPOTHYROIDISM: ICD-10-CM

## 2024-09-06 DIAGNOSIS — J47.0 BRONCHIECTASIS WITH ACUTE LOWER RESPIRATORY INFECTION (MULTI): ICD-10-CM

## 2024-09-06 DIAGNOSIS — E78.5 DYSLIPIDEMIA: ICD-10-CM

## 2024-09-06 DIAGNOSIS — E55.9 VITAMIN D DEFICIENCY, UNSPECIFIED: ICD-10-CM

## 2024-09-06 DIAGNOSIS — Z86.39 HISTORY OF HYPERGLYCEMIA: ICD-10-CM

## 2024-09-06 DIAGNOSIS — D50.8 OTHER IRON DEFICIENCY ANEMIA: ICD-10-CM

## 2024-09-06 LAB
25(OH)D3 SERPL-MCNC: 54 NG/ML (ref 30–100)
BASOPHILS # BLD AUTO: 0.06 X10*3/UL (ref 0–0.1)
BASOPHILS NFR BLD AUTO: 0.7 %
BUN SERPL-MCNC: 19 MG/DL (ref 6–23)
CHOLEST SERPL-MCNC: 144 MG/DL (ref 0–199)
CHOLESTEROL/HDL RATIO: 3
CREAT SERPL-MCNC: 0.89 MG/DL (ref 0.5–1.05)
EGFRCR SERPLBLD CKD-EPI 2021: 68 ML/MIN/1.73M*2
EOSINOPHIL # BLD AUTO: 0.15 X10*3/UL (ref 0–0.4)
EOSINOPHIL NFR BLD AUTO: 1.8 %
ERYTHROCYTE [DISTWIDTH] IN BLOOD BY AUTOMATED COUNT: 14.1 % (ref 11.5–14.5)
EST. AVERAGE GLUCOSE BLD GHB EST-MCNC: 137 MG/DL
FERRITIN SERPL-MCNC: 367 NG/ML (ref 8–150)
HBA1C MFR BLD: 6.4 %
HCT VFR BLD AUTO: 39 % (ref 36–46)
HDLC SERPL-MCNC: 47.4 MG/DL
HGB BLD-MCNC: 11.9 G/DL (ref 12–16)
IMM GRANULOCYTES # BLD AUTO: 0.04 X10*3/UL (ref 0–0.5)
IMM GRANULOCYTES NFR BLD AUTO: 0.5 % (ref 0–0.9)
IRON SATN MFR SERPL: 21 % (ref 25–45)
IRON SERPL-MCNC: 64 UG/DL (ref 35–150)
LDLC SERPL CALC-MCNC: 76 MG/DL
LYMPHOCYTES # BLD AUTO: 2.32 X10*3/UL (ref 0.8–3)
LYMPHOCYTES NFR BLD AUTO: 27.4 %
MCH RBC QN AUTO: 29.2 PG (ref 26–34)
MCHC RBC AUTO-ENTMCNC: 30.5 G/DL (ref 32–36)
MCV RBC AUTO: 96 FL (ref 80–100)
MONOCYTES # BLD AUTO: 0.79 X10*3/UL (ref 0.05–0.8)
MONOCYTES NFR BLD AUTO: 9.3 %
NEUTROPHILS # BLD AUTO: 5.1 X10*3/UL (ref 1.6–5.5)
NEUTROPHILS NFR BLD AUTO: 60.3 %
NON HDL CHOLESTEROL: 97 MG/DL (ref 0–149)
NRBC BLD-RTO: 0 /100 WBCS (ref 0–0)
PLATELET # BLD AUTO: 411 X10*3/UL (ref 150–450)
RBC # BLD AUTO: 4.07 X10*6/UL (ref 4–5.2)
TIBC SERPL-MCNC: 311 UG/DL (ref 240–445)
TRIGL SERPL-MCNC: 102 MG/DL (ref 0–149)
TSH SERPL-ACNC: 2.16 MIU/L (ref 0.44–3.98)
UIBC SERPL-MCNC: 247 UG/DL (ref 110–370)
VLDL: 20 MG/DL (ref 0–40)
WBC # BLD AUTO: 8.5 X10*3/UL (ref 4.4–11.3)

## 2024-09-06 PROCEDURE — 83550 IRON BINDING TEST: CPT

## 2024-09-06 PROCEDURE — 84520 ASSAY OF UREA NITROGEN: CPT

## 2024-09-06 PROCEDURE — 80061 LIPID PANEL: CPT

## 2024-09-06 PROCEDURE — 84443 ASSAY THYROID STIM HORMONE: CPT

## 2024-09-06 PROCEDURE — 82306 VITAMIN D 25 HYDROXY: CPT

## 2024-09-06 PROCEDURE — 82728 ASSAY OF FERRITIN: CPT

## 2024-09-06 PROCEDURE — 82565 ASSAY OF CREATININE: CPT

## 2024-09-06 PROCEDURE — 85025 COMPLETE CBC W/AUTO DIFF WBC: CPT

## 2024-09-06 PROCEDURE — 83036 HEMOGLOBIN GLYCOSYLATED A1C: CPT

## 2024-09-06 PROCEDURE — 83540 ASSAY OF IRON: CPT

## 2024-09-16 DIAGNOSIS — E78.00 HYPERCHOLESTEREMIA: ICD-10-CM

## 2024-09-16 RX ORDER — ROSUVASTATIN CALCIUM 10 MG/1
10 TABLET, COATED ORAL DAILY
Qty: 90 TABLET | Refills: 1 | Status: SHIPPED | OUTPATIENT
Start: 2024-09-16

## 2024-09-18 LAB
ACID FAST STN SPEC: NORMAL
MYCOBACTERIUM SPEC CULT: NORMAL

## 2024-09-19 LAB
ACID FAST STN SPEC: NORMAL
MYCOBACTERIUM SPEC CULT: NORMAL

## 2024-09-25 LAB
ACID FAST STN SPEC: NORMAL
MYCOBACTERIUM SPEC CULT: NORMAL

## 2024-10-02 ENCOUNTER — HOSPITAL ENCOUNTER (OUTPATIENT)
Dept: RADIOLOGY | Facility: HOSPITAL | Age: 74
Discharge: HOME | End: 2024-10-02
Payer: MEDICARE

## 2024-10-02 DIAGNOSIS — A49.8 PSEUDOMONAS AERUGINOSA INFECTION: ICD-10-CM

## 2024-10-02 DIAGNOSIS — A31.0 MYCOBACTERIUM AVIUM-INTRACELLULARE INFECTION (MULTI): ICD-10-CM

## 2024-10-02 DIAGNOSIS — J47.1 BRONCHIECTASIS WITH ACUTE EXACERBATION (MULTI): ICD-10-CM

## 2024-10-02 PROCEDURE — 71250 CT THORAX DX C-: CPT

## 2024-10-04 ENCOUNTER — CLINICAL SUPPORT (OUTPATIENT)
Dept: AUDIOLOGY | Facility: CLINIC | Age: 74
End: 2024-10-04
Payer: MEDICARE

## 2024-10-04 DIAGNOSIS — H91.93 BILATERAL HEARING LOSS, UNSPECIFIED HEARING LOSS TYPE: ICD-10-CM

## 2024-10-04 DIAGNOSIS — H90.42 SENSORINEURAL HEARING LOSS (SNHL) OF LEFT EAR WITH UNRESTRICTED HEARING OF RIGHT EAR: Primary | ICD-10-CM

## 2024-10-04 PROCEDURE — 92557 COMPREHENSIVE HEARING TEST: CPT | Performed by: AUDIOLOGIST

## 2024-10-04 PROCEDURE — 92550 TYMPANOMETRY & REFLEX THRESH: CPT | Performed by: AUDIOLOGIST

## 2024-10-04 ASSESSMENT — PAIN SCALES - GENERAL: PAINLEVEL_OUTOF10: 0 - NO PAIN

## 2024-10-04 ASSESSMENT — PAIN - FUNCTIONAL ASSESSMENT: PAIN_FUNCTIONAL_ASSESSMENT: 0-10

## 2024-10-04 NOTE — PROGRESS NOTES
HISTORY:  History of unilateral hearing loss in the left ear.  She did follow up with Dr. Liu on 4/6/23 and an MRI was discussed.   The patient was not interested in obtaining an MRI at that time.     She feels that her hearing is stable at this time.    No tinnitus  No vertigo  No pain  No aural fullness  No falls.    She has an immune deficiency and states that she rarely goes out.    She does not notice much difference in the hearing between both ears.      RESULTS:  Otoscopic inspection showed clear ear canals bilaterally.    Immittance testing showed normal tympanograms bilaterally.   Ipsilateral acoustic reflexes were tested at 500-4000Hz in both ears.  They were present at 500-4000Hz in the right ear and at 500-1000Hz in the left ear.  It was very difficult to obtain or maintain the seal in the left ear.    Distortion Product Otoacoustic Emission (DPOAE) testing was completed on both ears for the frequencies   2000-8000Hz.  The patient did not pass any any frequency today indicating abnormal cochlear outer hair cell function at those frequencies.  Pure tone air and bone conduction testing showed normal hearing at 125-8000Hz in the right ear and normal hearing at 125-2000Hz sloping to a moderate sensorineural hearing loss at 8000Hz in the left ear.   Word discrimination scores were excellent bilaterally.    IMPRESSIONS:  Her hearing has remained stable and she has an upcoming follow up appointment with Dr. Liu.      Treatment Plan:   Retest her hearing in conjunction with otologic care or annually.      TIME:    4880-0536

## 2024-10-07 ENCOUNTER — APPOINTMENT (OUTPATIENT)
Dept: RADIOLOGY | Facility: CLINIC | Age: 74
End: 2024-10-07
Payer: MEDICARE

## 2024-10-08 ENCOUNTER — LAB (OUTPATIENT)
Dept: LAB | Facility: LAB | Age: 74
End: 2024-10-08
Payer: MEDICARE

## 2024-10-08 ENCOUNTER — TELEPHONE (OUTPATIENT)
Dept: PULMONOLOGY | Facility: HOSPITAL | Age: 74
End: 2024-10-08
Payer: MEDICARE

## 2024-10-08 DIAGNOSIS — A31.0 MYCOBACTERIUM AVIUM-INTRACELLULARE INFECTION (MULTI): ICD-10-CM

## 2024-10-08 DIAGNOSIS — J47.1 BRONCHIECTASIS WITH ACUTE EXACERBATION (MULTI): ICD-10-CM

## 2024-10-08 DIAGNOSIS — A49.8 PSEUDOMONAS AERUGINOSA INFECTION: ICD-10-CM

## 2024-10-08 PROCEDURE — 87015 SPECIMEN INFECT AGNT CONCNTJ: CPT

## 2024-10-08 PROCEDURE — 87206 SMEAR FLUORESCENT/ACID STAI: CPT

## 2024-10-08 PROCEDURE — 87116 MYCOBACTERIA CULTURE: CPT

## 2024-10-08 NOTE — TELEPHONE ENCOUNTER
Pt reached out via [employer] message with c/o of productive cough with yellow green mucous and sleeping more during the day. She stated that she had GI symptoms last week of diarrhea and an episode of vomiting but it has since resolved. She only has sob when coughing. Denies any other symptoms. She dropped off a sputum culture at [facility]. She is using her nebulizer throughout the day, which is helping to bring up the mucous. She correlates the yellow/green sputum with pseudomonas and is wondering if there is anything else that needs to be done at this time. Dr. Gil notified. Per Dr. Gil, will wait for sputum culture results and pt advised to inform Dr. Galicia from ID. Pt was updated on plan of care and verbalized understanding. She stated that she will update Dr. Galicia.

## 2024-10-09 LAB — HOLD SPECIMEN: NORMAL

## 2024-10-10 ENCOUNTER — TELEPHONE (OUTPATIENT)
Dept: PULMONOLOGY | Facility: HOSPITAL | Age: 74
End: 2024-10-10
Payer: MEDICARE

## 2024-10-10 DIAGNOSIS — J47.9 BRONCHIECTASIS WITHOUT ACUTE EXACERBATION (MULTI): ICD-10-CM

## 2024-10-10 LAB
ACID FAST STN SPEC: NORMAL
MYCOBACTERIUM SPEC CULT: NORMAL

## 2024-10-10 NOTE — TELEPHONE ENCOUNTER
Per Dr. Gil, a bacterial sputum culture needs to be added on to pt's AFB culture collected on 10/8/2024. Microbiology lab was contacted to ensure this test can be added on to AFB culture. Microbiology lab confirmed it can be added on. Order placed.

## 2024-10-14 ENCOUNTER — TELEPHONE (OUTPATIENT)
Dept: PULMONOLOGY | Facility: HOSPITAL | Age: 74
End: 2024-10-14
Payer: MEDICARE

## 2024-10-14 DIAGNOSIS — J47.9 BRONCHIECTASIS WITHOUT ACUTE EXACERBATION (MULTI): ICD-10-CM

## 2024-10-14 NOTE — TELEPHONE ENCOUNTER
Pt reached out needing a 90 day supply for her sodium chloride 3% nebulizer solution. Order placed and routed to Dr. Gil to sign. Pt was updated via [employer] message.

## 2024-10-15 ENCOUNTER — TELEPHONE (OUTPATIENT)
Dept: PRIMARY CARE | Facility: CLINIC | Age: 74
End: 2024-10-15

## 2024-10-16 LAB
ACID FAST STN SPEC: NORMAL
MYCOBACTERIUM SPEC CULT: NORMAL

## 2024-10-17 ENCOUNTER — APPOINTMENT (OUTPATIENT)
Dept: OTOLARYNGOLOGY | Facility: CLINIC | Age: 74
End: 2024-10-17
Payer: MEDICARE

## 2024-10-17 VITALS — WEIGHT: 123 LBS | BODY MASS INDEX: 19.26 KG/M2

## 2024-10-17 DIAGNOSIS — H90.3 ASYMMETRICAL SENSORINEURAL HEARING LOSS: ICD-10-CM

## 2024-10-17 DIAGNOSIS — R22.1 MASS OF LEFT SIDE OF NECK: Primary | ICD-10-CM

## 2024-10-17 PROCEDURE — 99214 OFFICE O/P EST MOD 30 MIN: CPT | Performed by: GENERAL PRACTICE

## 2024-10-17 PROCEDURE — 1159F MED LIST DOCD IN RCRD: CPT | Performed by: GENERAL PRACTICE

## 2024-10-17 PROCEDURE — 1157F ADVNC CARE PLAN IN RCRD: CPT | Performed by: GENERAL PRACTICE

## 2024-10-17 PROCEDURE — 1036F TOBACCO NON-USER: CPT | Performed by: GENERAL PRACTICE

## 2024-10-17 NOTE — PROGRESS NOTES
Otolaryngology - Head and Neck Surgery Outpatient New Patient Visit Note        Assessment/Plan:   Problem List Items Addressed This Visit    None  Visit Diagnoses         Codes    Mass of left side of neck    -  Primary R22.1    Relevant Orders    US head neck soft tissue    Asymmetrical sensorineural hearing loss     H90.3          Left tail of parotid fullness/mass over several years.  Some waxing/waning with URI, but little overall change.     Discussed observation vs US and pt elects for US.     Stable asymmetric L>R high frequency SNHL.  Pt prefers to continue to observe.             Follow up:  -plan for follow up in clinic as needed and after completion of ordered studies    All of the above findings, impressions, treatment planning and follow up plans were discussed with the patient who indicated understanding.  the patient was instructed to contact or return to clinic sooner if symptoms/signs persist or worsen despite the above management.      Enrico Liu MD  Otolaryngology - Head and Neck Surgery            History Of Present Illness  Abad Yan is a 74 y.o. female presenting for follow up of hearing loss as well as discussion left neck fullness/mass.     Reports no chagne in hearing.  Recent audiogram 10/4/24 shows no changes from last year.    Pt reports left high neck (tail of parotid region) mass/fullness which has been present for >2-3.  No inciting illness/trauma recalled  Reports mild increase in size over time,  Mild waxing/waning with URI symptoms.    Minimally TTP.       No dysphagia, odynophagia, dysphonia, dyspnea.  No oral lesions or masses.  No sore throat or globus sensation.  No otalgia, aural fullness or hearing change.  No new nasal obstruction or epistaxis.  No other neck masses or lumps.  No unintentional weight loss, night sweats, F/C, N/V or systemic symptoms of toxicity or malignancy.     Recall    72yoF presents for evaluation of left sided hearing loss.      The  patient reports slow progression of the hearing loss over time.  The patient denies tinnitus.  The patient denies a history of significant noise exposure due to occupational exposures, industrial noise, etc.   The patient denies sudden changes in hearing.  The patient denies otalgia, otorrhea, vertigo, or facial weakness.   The patient denies a history of otologic surgery or trauma.  The patient denies a history of blast injury, TBI or concussion associated with hearing loss.  The patient denies a family history of significant hearing loss.  The patient reports a history of AOM as a child, but no recent significant history of ear infection.                 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, Cavitary lesion of lung (08/05/2024), Chest pain (02/12/2024), Chronic headache, Chronic rhinitis, Contact with and (suspected) exposure to pediculosis, acariasis and other infestations (07/24/2014), COPD (chronic obstructive pulmonary disease) (Multi), Cramps of lower extremity (02/12/2024), Depression, Disorder of kidney and ureter, unspecified (01/19/2016), Encounter for screening mammogram for malignant neoplasm of breast (12/09/2015), Fall due to slipping on ice or snow (02/12/2024), GERD (gastroesophageal reflux disease), Headache, unspecified (06/28/2017), Hemoptysis (10/25/2019), History of cardiovascular disorder (02/12/2024), Hyperlipidemia, Hypotension determined by examination (03/14/2024), Hypothyroidism, Immunodeficiency (Multi), Leukopenia, Menopausal and female climacteric states (07/21/2015), Migraine, Nausea (02/12/2024), Nondisplaced fracture of neck of right radius, initial encounter for closed fracture (05/08/2019), Osteopenia, Other conditions influencing  health status, Other conditions influencing health status, Pain in wrist (02/12/2024), Palpitations (09/13/2023), Pancreatic cyst ([facility]), Personal history of diseases of the blood and blood-forming organs and certain disorders involving the immune mechanism (05/27/2022), Personal history of other diseases of the circulatory system (12/07/2018), Personal history of other diseases of the circulatory system (11/05/2018), Personal history of other diseases of the circulatory system (12/07/2018), Personal history of other diseases of the circulatory system, Personal history of other diseases of the female genital tract (07/21/2015), Personal history of other diseases of the female genital tract, Personal history of other diseases of the respiratory system (08/05/2013), Personal history of other diseases of the respiratory system (01/27/2016), Personal history of other diseases of the respiratory system (09/12/2013), Personal history of other diseases of the respiratory system (12/16/2013), Personal history of other diseases of the respiratory system (08/05/2013), Personal history of other diseases of urinary system (03/14/2022), Personal history of other specified conditions (03/10/2016), Personal history of other specified conditions (01/06/2020), Personal history of other specified conditions (11/21/2018), Pneumonia, unspecified organism (11/06/2015), Pseudomonas aeruginosa infection (02/12/2024), Rash (02/12/2024), Rash and other nonspecific skin eruption (12/08/2014), Restless legs, Suspected sleep apnea, Trochanteric bursitis, unspecified hip (04/18/2014), Urinary tract infection, site not specified (02/10/2022), and Uterine prolapse.    Surgical History  She has a past surgical history that includes Hernia repair (10/08/2012); Dilation and curettage of uterus (10/08/2012); and Bronchoscopy.     Social History  She reports that she quit smoking about 47 years ago. Her smoking use included cigarettes. She started  smoking about 57 years ago. She has a 15 pack-year smoking history. She has never used smokeless tobacco. She reports that she does not currently use alcohol. She reports that she does not use drugs.    Family History  Family History   Problem Relation Name Age of Onset    Cancer Brother      Other (cardiovascular disorder) Brother      Allergies Other      Cancer Other      Hearing loss Other      Hypertension Other      Other (pulmonary disease) Other      Breast cancer Father's Sister  90        Allergies  Citalopram, Doxepin, Fluoxetine, Nortriptyline, and Sertraline    Review of Systems  ROS: Pertinent positives as noted in HPI.    - CONSTITUTIONAL: Does not report weight loss, fever or chills.    - HEENT:   Ear: Does not report tinnitus, vertigo,    , otalgia, otorrhea  Nose: Does not report congestion, rhinorrhea, epistaxis, decreased smell  Throat: Does not report pain, dysphagia, odynophagia  Larynx: Does not report hoarseness,  difficulty breathing, pain with speaking (odynophonia)  Neck: Does not report    , pain, swelling  Face: Does not report sinus pain, pressure, swelling, numbness, weakness     - RESPIRATORY: Does not report SOB or cough.    - CV: Does not report palpitations or chest pain.     - GI: Does not report abdominal pain, nausea, vomiting or diarrhea.    - : Does not report dysuria or urinary frequency.    - MSK: Does not report myalgia or joint pain.    - SKIN: Does not report rash or pruritus.    - NEUROLOGICAL: Does not report headache or syncope.    - PSYCHIATRIC: Does not report recent changes in mood. Does not report anxiety or depression.         Physical Exam:     GENERAL:   Alert & Oriented to person, place and time; Normal affect and appearance. Well developed and well nourished. Conversant & cooperative with examination.     HEAD:   Normocephalic, atraumatic. No sinus tenderness to palpation. Normal parotid bilaterally. Normal facial strength.     NEUROLOGIC:   Cranial nerves  II-XII grossly intact, gait WNL. Normal mood and affect.    EYES:   Extraocular movements intact. Pupils equal, round, reactive to light and accommodation. No nystagmus, no ptosis. no scleral injection.    EAR:   Normal auricle. No discomfort or TTP with manipulation.   Handheld otoscopic exam showed normal external auditory canals bilaterally. No purulence or EAC inflammation. Minimal cerumen.   Right tympanic membrane clear and mobile without evidence of perforation, retraction or middle ear effusion.   Left tympanic membrane clear and mobile without evidence of perforation, retraction or middle ear effusion.     NOSE:   No external deformity. No external nasal lesions, lacerations, or scars. Nasal tip symmetrical with normal nasal valves.   Nasal cavity with essentially midline septum, normal mucosa and turbinates. No lesions, masses, purulence or polyps.     OC/OP:   Mucous membranes moist, no masses, lesions or exudates.   Normal tongue, floor of mouth, teeth, gums, lips. Normal posterior pharyngeal wall.    Normal tonsils without erythema, exudate or obvious calculi     NECK:  left level II b 1.5cm fullness which is mildly TTP, fairly fixed.  No local induration, fluid.    No other  neck masses or thyroid enlargement. Trachea midline. No tenderness to palpation    LYMPHATIC:   No cervical lymphadenopathy.     RESPIRATORY:   Symmetric chest elevation & no retractions. No significant hoarseness. No increased work of breathing.    CV:   No clubbing or cyanosis. No obvious edema    Skin:   No facial rashes, vesicles or lesions.     Extremities:   No gross abnormalities      Clinic Procedure        Information review:  External sources (notes, imaging, lab results) listed below personally reviewed to aid in medical decision making process.  -  -  -

## 2024-10-18 ENCOUNTER — HOSPITAL ENCOUNTER (OUTPATIENT)
Dept: RADIOLOGY | Facility: CLINIC | Age: 74
Discharge: HOME | End: 2024-10-18
Payer: MEDICARE

## 2024-10-18 VITALS — HEIGHT: 67 IN | BODY MASS INDEX: 19.15 KG/M2 | WEIGHT: 122 LBS

## 2024-10-18 DIAGNOSIS — Z12.31 SCREENING MAMMOGRAM FOR BREAST CANCER: ICD-10-CM

## 2024-10-18 PROCEDURE — 77067 SCR MAMMO BI INCL CAD: CPT

## 2024-10-22 ENCOUNTER — HOSPITAL ENCOUNTER (OUTPATIENT)
Dept: RADIOLOGY | Facility: CLINIC | Age: 74
Discharge: HOME | End: 2024-10-22
Payer: MEDICARE

## 2024-10-22 DIAGNOSIS — R22.1 MASS OF LEFT SIDE OF NECK: ICD-10-CM

## 2024-10-22 PROCEDURE — 76536 US EXAM OF HEAD AND NECK: CPT

## 2024-10-22 PROCEDURE — 76536 US EXAM OF HEAD AND NECK: CPT | Performed by: RADIOLOGY

## 2024-10-23 LAB
ACID FAST STN SPEC: NORMAL
MYCOBACTERIUM SPEC CULT: NORMAL

## 2024-10-29 ENCOUNTER — OFFICE VISIT (OUTPATIENT)
Dept: PULMONOLOGY | Facility: HOSPITAL | Age: 74
End: 2024-10-29
Payer: MEDICARE

## 2024-10-29 VITALS
HEART RATE: 94 BPM | SYSTOLIC BLOOD PRESSURE: 103 MMHG | TEMPERATURE: 97.9 F | DIASTOLIC BLOOD PRESSURE: 67 MMHG | OXYGEN SATURATION: 97 % | WEIGHT: 126.9 LBS | BODY MASS INDEX: 19.88 KG/M2

## 2024-10-29 DIAGNOSIS — J47.1 BRONCHIECTASIS WITH ACUTE EXACERBATION (MULTI): ICD-10-CM

## 2024-10-29 DIAGNOSIS — A49.8 PSEUDOMONAS AERUGINOSA INFECTION: Primary | ICD-10-CM

## 2024-10-29 PROCEDURE — 1126F AMNT PAIN NOTED NONE PRSNT: CPT | Performed by: INTERNAL MEDICINE

## 2024-10-29 PROCEDURE — 99213 OFFICE O/P EST LOW 20 MIN: CPT | Performed by: INTERNAL MEDICINE

## 2024-10-29 PROCEDURE — 1159F MED LIST DOCD IN RCRD: CPT | Performed by: INTERNAL MEDICINE

## 2024-10-29 PROCEDURE — 1157F ADVNC CARE PLAN IN RCRD: CPT | Performed by: INTERNAL MEDICINE

## 2024-10-29 RX ORDER — CEFTAZIDIME 6 G/30ML
INJECTION, POWDER, FOR SOLUTION INTRAVENOUS
COMMUNITY
Start: 2024-10-28 | End: 2025-01-07 | Stop reason: WASHOUT

## 2024-10-29 ASSESSMENT — LIFESTYLE VARIABLES
AUDIT-C TOTAL SCORE: 1
HOW OFTEN DO YOU HAVE A DRINK CONTAINING ALCOHOL: MONTHLY OR LESS
HOW OFTEN DO YOU HAVE SIX OR MORE DRINKS ON ONE OCCASION: NEVER
SKIP TO QUESTIONS 9-10: 1
HOW MANY STANDARD DRINKS CONTAINING ALCOHOL DO YOU HAVE ON A TYPICAL DAY: 1 OR 2

## 2024-10-29 ASSESSMENT — PAIN SCALES - GENERAL: PAINLEVEL_OUTOF10: 0-NO PAIN

## 2024-10-29 NOTE — PATIENT INSTRUCTIONS
Thank you for allowing me to participate in your health care today.    The primary respiratory problem that we discussed is bronchiectasis and recent pseudomonas infection  Glad you are feeling better  Keep using 3% saline nebulizer just prior to the vest therapy after an albuterol treatment  Vest therapy three times a day  Lets decide on chest CT next visit  Follow-up in this clinic in January - February    Please call [phone] ([facility]) to get your tests scheduled.     Please call [phone] to schedule a follow up appointment with me.    Unless deemed urgent or emergent, the result(s) of any tests ordering during this clinic visit will be reviewed during your follow-up visit. Depending on the urgency of the result(s), I may call or send you a [employer] message.

## 2024-10-29 NOTE — PROGRESS NOTES
Subjective   Patient ID: Abad Yan is a 74 y.o. female who presents for Pt. here today for FUV on bronchiectasis    HPI  Last visit in this clinic was 08/20/24    Abad Yan is a 74 y.o. 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 Given the resistance pattern of this strain IV therapy was given. A 14-day course of  cefepime. Her course was complicated acute left DVT of axillary/brachial vein + SVT basilic vein related to the PICC line. She is now on Eliquis.    She reports fatigue and energy has improved but she continues to sleep during the day. She continues to have chronic cough, sometimes to the point of emesis. She is not currently staying active. Denies night sweats and fever.     She is currently taking 3% saline nebulizer 3x daily, albuterol nebulizer 2x daily, and Privigen 10% solution infusions.    Review of Systems  Review of Pertinent Systems:  Constitutional: no chills, no fever, + fatigue, stable weight  Eyes: no blurred vision and no dryness of the eyes.   ENT: no nasal congestion, no hoarseness, no sore throat, no postnasal drip.  Cardiovascular: as per HPI   Respiratory: as per HPI  Gastrointestinal: no nausea and no vomiting. No diarrhea or constipation. No reflux.  Integumentary: no rashes.      Objective   Physical Exam  Vitals:    10/29/24 0919   BP: 103/67   Pulse: 94   Temp: 36.6 °C (97.9 °F)   SpO2: 97%     GENERAL: Normal appearance. Well nourished. No respiratory distress. Frequent coughing   HEAD/SINUSES: No sinus tenderness.   OROPHARYNX: Moist mucosa, no thrush or lesions -   NECK: No JVD, midline trachea without stridor.   LYMPH NODES: None felt in the cervical, submandibular, or supraclavicular regions.   LUNGS: Clear lung fields anteriorly and posteriorly no  wheezing or rhonchi  CARDIAC: Normal S1 and S2; no gallops, rubs or murmurs. Regular rate and rhythm.   EXTREMITIES: No edema. Varicose veins noted  NEURO: Grossly normal mental status, CNs, reflexes, and motor strength.   SKIN: Skin turgor normal. No rashes or lesions.    PSYCH: Normal affect    CT Chest 10/02/24  IMPRESSION:  1. Similar appearance of bronchiectasis, bronchial wall thickening, mucous plugging, and tree-in-bud opacities predominantly affecting the right upper lobe, anterior right middle lobe, lingula, and  superior right lower lobe. A dominant right upper lobe cavitary lesion demonstrates increasing component of cavitation compared to the prior CT chest. There is also interval development of the new  small cavitary lesion within the right upper lobe. Findings are in keeping with patient's chronic airway disease with endobronchial spread and correlate with mycobacterial infection. Can not exclude  superimposed bacterial infectious process.  2. Slightly improved consolidation in the right upper lung adjacent to the previously described cavitary lesion.    Assessment/Plan   Diagnoses and all orders for this visit:  Pseudomonas aeruginosa infection treated with IV antibiotics. Clinically appears controlled  -     Follow Up In Pulmonology; Future  Bronchiectasis with acute exacerbation (Multi) - improved post treatment. Need to keep monitoring for bacterial and mycobacterial growth  -     Follow Up In Pulmonology; Future  The primary respiratory problem that we discussed is bronchiectasis and recent pseudomonas infection  Glad you are feeling better  Keep using 3% saline nebulizer just prior to the vest therapy after an albuterol treatment  Vest therapy three times a day  Lets decide on chest CT next visit  Follow-up in this clinic in January - February    Scribe Attestation  By signing my name below, IBrandon, Alinaibsamantha   attest that this documentation has been prepared under the direction and in  the presence of Crissy Gil MD.

## 2024-10-30 DIAGNOSIS — I82.A12 ACUTE DEEP VEIN THROMBOSIS (DVT) OF AXILLARY VEIN OF LEFT UPPER EXTREMITY (MULTI): ICD-10-CM

## 2024-10-30 LAB
ACID FAST STN SPEC: NORMAL
MYCOBACTERIUM SPEC CULT: NORMAL

## 2024-11-01 ENCOUNTER — TELEPHONE (OUTPATIENT)
Dept: OTOLARYNGOLOGY | Facility: CLINIC | Age: 74
End: 2024-11-01
Payer: MEDICARE

## 2024-11-04 ENCOUNTER — TELEPHONE (OUTPATIENT)
Dept: PRIMARY CARE | Facility: CLINIC | Age: 74
End: 2024-11-04
Payer: MEDICARE

## 2024-11-04 DIAGNOSIS — R22.1 MASS OF LEFT SIDE OF NECK: Primary | ICD-10-CM

## 2024-11-06 LAB
ACID FAST STN SPEC: NORMAL
MYCOBACTERIUM SPEC CULT: NORMAL

## 2024-11-12 ENCOUNTER — OFFICE VISIT (OUTPATIENT)
Dept: CARDIOLOGY | Facility: HOSPITAL | Age: 74
End: 2024-11-12
Payer: MEDICARE

## 2024-11-12 VITALS
HEART RATE: 77 BPM | HEIGHT: 67 IN | DIASTOLIC BLOOD PRESSURE: 70 MMHG | BODY MASS INDEX: 20.09 KG/M2 | WEIGHT: 128 LBS | SYSTOLIC BLOOD PRESSURE: 133 MMHG

## 2024-11-12 DIAGNOSIS — I82.A12 DVT OF AXILLARY VEIN, ACUTE LEFT (MULTI): ICD-10-CM

## 2024-11-12 DIAGNOSIS — Z79.01 ON APIXABAN THERAPY: ICD-10-CM

## 2024-11-12 PROCEDURE — 1157F ADVNC CARE PLAN IN RCRD: CPT | Performed by: INTERNAL MEDICINE

## 2024-11-12 PROCEDURE — 99213 OFFICE O/P EST LOW 20 MIN: CPT | Performed by: INTERNAL MEDICINE

## 2024-11-12 PROCEDURE — 1159F MED LIST DOCD IN RCRD: CPT | Performed by: INTERNAL MEDICINE

## 2024-11-12 PROCEDURE — 1036F TOBACCO NON-USER: CPT | Performed by: INTERNAL MEDICINE

## 2024-11-12 NOTE — PROGRESS NOTES
11/12/24    Vascular Medicine    Ms.Hagesfeld Yan is seen in follow-up of left arm line related DVT. This was dx 8.3.2024 and she has been on Eliquis 5 mg Q12. She has immunodeficiency and recurrent lung infections/bronchiectasis.    Since I saw her last, she continues Eliquis 5 mg Q12 hours. No arm swelling or pain. She had a right arm PICC placed for another lung infection and will have that removed Thursday. No bleeding.    Patient Active Problem List   Diagnosis    Bronchiectasis with acute exacerbation (Multi)    Pseudomonas aeruginosa infection    Chronic bilateral low back pain with left-sided sciatica    Chronic headache    Chronic pain in left foot    Chronic left shoulder pain    Common variable immunodeficiency    Cystocele with uterine prolapse    Depression, recurrent (CMS-HCC)    Recurrent UTI    Dyslipidemia    GERD (gastroesophageal reflux disease)    Hypokalemia    Hypothyroidism    Immunodeficiency (Multi)    Hypersomnia    Leukopenia    Malaise    Melanocytic nevi of trunk    Migraine syndrome    Multinodular goiter    Neoplasm of uncertain behavior of pancreas    Osteopenia    Pancreatic cyst (HHS-HCC)    Physical deconditioning    Postmenopausal atrophic vaginitis    Postmenopausal bleeding    Postural dizziness    Pyelonephritis    Restless legs syndrome    Rhytidosis facialis    Sciatic radiculitis    Snores    Suspected sleep apnea    Vitamin D deficiency    Age related osteoporosis    Insomnia    SNHL (sensorineural hearing loss)    Varicose veins of both lower extremities    Tarlov cysts    Varicose veins of lower extremity    Visceral larva migrans syndrome    Muscle weakness on examination    Atherosclerosis of aorta (CMS-HCC)    Mycobacterium avium-intracellulare infection (Multi)    Wellness examination    DVT of axillary vein, acute left (Multi)    Cavitary lesion of lung    On apixaban therapy    Acute thrombosis of left basilic vein    Immunoglobulin deficiency (Multi)    Iron  deficiency anemia     Current Outpatient Medications   Medication Sig Dispense Refill    apixaban (Eliquis) 5 mg tablet TAKE 1 TABLET BY MOUTH TWICE DAILY 60 tablet 0    aflibercept (Eylea) 2 mg/0.05 mL intra-ocular injection 0.05 mL (2 mg) by intravitreal route 1 time.      albuterol 2.5 mg /3 mL (0.083 %) nebulizer solution Take 3 mL (2.5 mg) by nebulization 2 times a day. 540 mL 3    azelastine (Astelin) 137 mcg (0.1 %) nasal spray Administer 2 sprays into each nostril 2 times a day as needed.      CALCIUM CARBONATE-VITAMIN D3 ORAL Take 1 tablet by mouth once daily.      cefTAZidime (Fortaz) 6 gram       coenzyme Q-10 100 mg capsule Take 1 capsule (100 mg) by mouth.      cranberry 500 mg capsule Take 1 capsule by mouth once daily.      escitalopram (Lexapro) 5 mg tablet Take one tablet qpm 90 tablet 1    estradiol (Estrace) 0.01 % (0.1 mg/gram) vaginal cream Insert 0.25 Applicatorfuls (1 g) into the vagina 2 times a week. 42.5 g 3    famotidine (Pepcid) 20 mg tablet Take one tablet in empty stomach in amand one table in empty stomach in the afternoon 180 tablet 1    ferrous sulfate  mg ER tablet Take 1 tablet by mouth once daily.      folic acid (Folvite) 1 mg tablet Take 1 tablet (1 mg) by mouth once daily.      immune globulin, human, (Privigen) 10 % solution infusion orally every 3 weeks      ipratropium (Atrovent) 42 mcg (0.06 %) nasal spray Administer 2 sprays into each nostril 3 times a day.      magnesium oxide (Mag-Ox) 250 mg magnesium tablet Take 1 tablet (250 mg) by mouth once daily.      multivitamin (Multiple Vitamins) tablet Take 1 tablet by mouth once daily.      potassium chloride ER (Micro-K) 10 mEq ER capsule TAKE 1 CAPSULE DAILY WITH FOOD 90 capsule 1    psyllium (Metamucil) 0.4 gram capsule Take 1 capsule by mouth 4 times a day as needed.      rOPINIRole (Requip) 4 mg tablet TAKE 1 TABLET DAILY AT BEDTIME 90 tablet 1    rosuvastatin (Crestor) 5 mg tablet Take one tablet qday 90 tablet 1  "   sodium chloride 3% nebulizer solution Take 3 mL by nebulization 3 times a day. 810 mL 3    tretinoin (Retin-A) 0.025 % cream Apply topically once daily at bedtime. A pea-sized amount to the whole face, start every 2-3 nights and gradually increase to nightly 45 g 3     No current facility-administered medications for this visit.     On exam:  /70 (BP Location: Left arm)   Pulse 77   Ht 1.702 m (5' 7\")   Wt 58.1 kg (128 lb)   LMP  (LMP Unknown)   BMI 20.05 kg/m²   She is in no distress, masked  JVP not elevated  No chest wall collaterals veins  No leg swelling, dry skin on legs      Recent labs 11.5  HgB 11.4  WBC 8  PLT 316K  Cr 0.79    8.3 Venous Duplex  IMPRESSION:  1.  Lack of flow within the left axillary vein with an intraluminal  PICC, compatible with thrombosis.  2. Partial compressibility of the left brachial vein, and left  basilic vein with intraluminal PICC. Persistent flow is noted within  these veins, however a partially occlusive thrombus can not be  excluded.      Assessment/Plan: 74 y.o. woman with left axillary/brachial DVT + SVT related to PICC line. Line ine has been removed. She has finished 3+ months of Eliquis.  She still has right arm PICC in place; to be removed later this week. Once that line is removed can stop Eliquis. I'd recommend VTE prophylaxis for future line placement, could be Xarelto 10 mg QD, Eliquis 2.5 mg BID or enoxaparin 40mg QD.    I will check in with her next year; she will let me know if she need another line.      Shawna Jamil MD            "

## 2024-11-12 NOTE — PATIENT INSTRUCTIONS
Continue medications as prescribed.  When your PICC line comes out, you can stop Eliquis that morning.  If you need another line in the future, please let us know.     See you back virtually in 6 months.    Shawna Jamil MD  Co-Director, Vascular Center  [facility], [facility]   Amador Piedra Master Clinician in Fibromuscular Dysplasia and Vascular Care  Professor of Medicine  [facility]

## 2024-11-13 ENCOUNTER — HOSPITAL ENCOUNTER (OUTPATIENT)
Dept: RADIOLOGY | Facility: HOSPITAL | Age: 74
Discharge: HOME | End: 2024-11-13
Payer: MEDICARE

## 2024-11-13 DIAGNOSIS — R22.1 MASS OF LEFT SIDE OF NECK: ICD-10-CM

## 2024-11-13 LAB
ACID FAST STN SPEC: NORMAL
MYCOBACTERIUM SPEC CULT: NORMAL

## 2024-11-13 PROCEDURE — 2550000001 HC RX 255 CONTRASTS: Performed by: GENERAL PRACTICE

## 2024-11-13 PROCEDURE — 70491 CT SOFT TISSUE NECK W/DYE: CPT

## 2024-11-20 LAB
ACID FAST STN SPEC: NORMAL
MYCOBACTERIUM SPEC CULT: NORMAL

## 2024-11-27 LAB
ACID FAST STN SPEC: NORMAL
MYCOBACTERIUM SPEC CULT: NORMAL

## 2024-12-04 DIAGNOSIS — K21.00 GASTROESOPHAGEAL REFLUX DISEASE WITH ESOPHAGITIS WITHOUT HEMORRHAGE: ICD-10-CM

## 2024-12-04 LAB
ACID FAST STN SPEC: NORMAL
MYCOBACTERIUM SPEC CULT: NORMAL

## 2024-12-04 RX ORDER — FAMOTIDINE 20 MG/1
TABLET, FILM COATED ORAL
Qty: 180 TABLET | Refills: 1 | Status: SHIPPED | OUTPATIENT
Start: 2024-12-04

## 2024-12-06 ENCOUNTER — APPOINTMENT (OUTPATIENT)
Dept: PRIMARY CARE | Facility: CLINIC | Age: 74
End: 2024-12-06
Payer: MEDICARE

## 2024-12-06 ENCOUNTER — TELEPHONE (OUTPATIENT)
Dept: OTOLARYNGOLOGY | Facility: CLINIC | Age: 74
End: 2024-12-06

## 2024-12-09 NOTE — PROGRESS NOTES
"Patient: Rachana Smart    YOB: 1981    Date: 12/09/2024    Primary Care Provider: INA Holloway MD    Vital Signs:   Vitals:    12/09/24 0915   BP: 121/82   Weight: 68 kg (150 lb)   Height: 169 cm (66.54\")       The patient is tolerating a regular diet and has no complaints s/p bilateral mastectomy on 11/05/24 by Dr. Verduzco. The patient denies fevers, chills, nausea, vomiting, and excessive pain. The incisions are healing well without signs of infection or wound dehiscence. She is here for recheck for seromas.     PROCEDURE     Upon physical exam, Rachana Smart is in need of drainage of the seroma present in bilateral mastectomy beds. After discussion of the procedure, as well as risks (including bleeding, infection, and damage to adjacent structures) and benefits, the patient elects to proceed. The right mastectomy bed was prepped with iodine. 2 ccs of lidocaine was used to anesthetize the area. An 18 gauge and 20cc syringe were inserted in the area and 27 mLs of blood tinged fluid were drained from the area. The syringe was removed and a bandage was placed over the area. Attention was then turned to the left mastectomy bed. 2 ccs of lidocaine was used to anesthetize the area. An 18 gauge and 20cc syringe were inserted in the area and 18 mLs of straw colored fluid were drained from the area. The syringe was removed and a bandage was placed over the area. The patient tolerated the procedure well. I have instructed her that it is important to keep compression over the area for the next week until she is seen in clinic again. She will call for a sooner appointment if she notices significant fluid collecting again.     Assessment / Plan:    Diagnoses and all orders for this visit:    1. S/P bilateral mastectomy (Primary)        Rachana Smart is a 43 y.o. female who presents to the clinic 4 weeks s/p bilateral mastectomy. She is overall doing well at this time. The incisions " 08/26/24    Vascular Medicine Consultation    Referring  Eva De Paz MD  [facility], [address]  [address] [place] [address]    Subjective   Patient ID: [id number] Yan is a 73 y.o. female who presents for Dvt.    HPI   Abad Yan is a 74 yo female with hx of CVID (on IVIG q3 weeks),  chronic bronchietasis, hx of pseudomonas lung colonization and MAC here for anticoagulation recommendation after she was found to have a picc line related VTE in the left brachial, and left basilic vein. She states she was seen by her pulmonologist for bronchiectasis exacerbation and had a bronchoscopy on 7/31 and a picc line was placed on 8/1 for IV abx which she needed for 2 weeks. She reports on 8/3 she developed chest pressure which prompted her to be seen in the ER. CTA chest was done and no PE was noted in the lungs.  She was found to have LUE DVT in the left brachial, and left basilic vein. She was started on IV Heparin while inpatient and was transitioned to Apixaban 5 mg BID on discharge (first dose 8/5). She has been taking her Apixaban as directed, however she believes that she might have missed few doses initially.   She denies any prior hx of DVT, VTE, aneurysms, CAD, strokes. Denies any left arm pain or swelling. Reports that the picc line was functional and she continued to use it for IV Abx for 2 weeks. Her picc line was eventually removed on 8/16. She denies any abnormal bleeding while on Eliquis.     Fhx:  No family hx of DVT/VTE    Social hx:  Former smoker (smoked from age [age])  She is up to date with her age/gender appropriate cancer screening.       Review of Systems   Respiratory:  Negative for chest tightness, shortness of breath and wheezing.    Cardiovascular:  Negative for chest pain, palpitations and leg swelling.   Gastrointestinal:  Negative for abdominal pain.   All other systems reviewed and are negative.      Objective   /72 (BP  "Location: Left arm, Patient Position: Sitting, BP Cuff Size: Adult)   Pulse 89   Ht 1.702 m (5' 7\")   Wt 56.2 kg (123 lb 12.8 oz)   LMP  (LMP Unknown)   SpO2 96%   BMI 19.39 kg/m²     Physical Exam  Constitutional:       General: She is not in acute distress.     Appearance: Normal appearance. She is not ill-appearing.   Neck:      Vascular: No carotid bruit.   Cardiovascular:      Rate and Rhythm: Normal rate and regular rhythm.      Pulses: Normal pulses.      Heart sounds: Normal heart sounds. No murmur heard.  Pulmonary:      Effort: Pulmonary effort is normal. No respiratory distress.      Breath sounds: Normal breath sounds. No wheezing.   Abdominal:      General: Abdomen is flat. Bowel sounds are normal.      Palpations: Abdomen is soft.      Tenderness: There is no abdominal tenderness.   Musculoskeletal:         General: No swelling or tenderness.      Comments: Varicose veins in bilateral Legs       IMAGING:  Vascular US left upper extremity 8/3/24:  IMPRESSION:  1.  Lack of flow within the left axillary vein with an intraluminal  PICC, compatible with thrombosis.  2. Partial compressibility of the left brachial vein, and left  basilic vein with intraluminal PICC. Persistent flow is noted within  these veins, however a partially occlusive thrombus can not be  excluded.    Assessment/Plan     Abad Yan is a 74 yo female with hx of CVID (on IVIG q3 weeks),  chronic bronchietasis, hx of pseudomonas lung colonization and MAC here for anticoagulation recommendation after she was found to have a picc line related VTE in the left axillary and brachial DVT and left basilic vein SVT. Her Picc line was reoved on 8/16 after completion of abx. Given the provoked nature of her clot which was in the setting of a PICC line, she needs to remain on Eliquis for 3 months. She will obtain blood work today, CBC and BMP to ensure her hgb and renal function have remained stable while on anticoagulation.  If " are healing well. At this time, she will follow up in office in 1 week with me for recheck for seroma recollection. I instructed the patient to call for an appointment sooner if she has any new problems or concerns. She voiced understanding and is agreeable to the plan.    Follow up:     Return in about 1 week (around 12/16/2024) for Recheck.        Electronically signed by Farzana Izaguirre PA-C  12/09/24  11:39 CST     her pulmonologist determines that she needs a port or a picc line in the future, we can consider low dose of AC (Eliquis 2.5 mg or Lovenox subcutaneous) for line-associated VTE prophylaxis.   She will follow up with  in 3 months.     Diagnoses and all orders for this visit:  DVT of axillary/brachial vein + SVT basilic vein, acute left (Multi)  -     Basic metabolic panel; Future  -     CBC; Future    Patient was seen with the attending, Dr. Jamil,    Kayla Bowling MD   Fellow        08/26/24  4:59 PM    Vascular Medicine    I saw and evaluated the patient. I personally obtained the key and critical portions of the history and physical exam or was physically present for key and critical portions performed by the resident/fellow. I reviewed the resident/fellow's documentation and discussed the patient with the resident/fellow. I agree with the resident/fellow's medical decision making as documented in the note with the exception/addition of the following:    I saw and examined Ms. Karan Yan and reviewed her duplex. As above, no prior hx of VTE or family hx of same. She has PICC line related basilic SVT with brachial/axillary DVT. Line is out and she is on Eliquis. As above, recommend 3 months of therapy; reviewed need to avoid any missed doses and some strategies to help with this.  After completion of Rx, if another line needs to be placed for Rx would have her on either Eliquis 2.5 mg BID or enoxaparin 40 mg QD for prevention of another event. No indication for thrombophilia testing.    [facility] 11.2024. interval follow up on CBC and BMP results.    Shawna Jamil MD

## 2024-12-12 ENCOUNTER — OFFICE VISIT (OUTPATIENT)
Dept: OTOLARYNGOLOGY | Facility: CLINIC | Age: 74
End: 2024-12-12
Payer: MEDICARE

## 2024-12-12 VITALS — WEIGHT: 122 LBS | BODY MASS INDEX: 19.11 KG/M2

## 2024-12-12 DIAGNOSIS — R05.3 CHRONIC COUGH: Primary | ICD-10-CM

## 2024-12-12 DIAGNOSIS — J98.4 CAVITARY LESION OF LUNG: ICD-10-CM

## 2024-12-12 DIAGNOSIS — K21.9 CHRONIC GERD: ICD-10-CM

## 2024-12-12 DIAGNOSIS — J32.3 SPHENOID SINUSITIS, UNSPECIFIED CHRONICITY: ICD-10-CM

## 2024-12-12 PROCEDURE — 1159F MED LIST DOCD IN RCRD: CPT | Performed by: GENERAL PRACTICE

## 2024-12-12 PROCEDURE — 31575 DIAGNOSTIC LARYNGOSCOPY: CPT | Performed by: GENERAL PRACTICE

## 2024-12-12 PROCEDURE — 1157F ADVNC CARE PLAN IN RCRD: CPT | Performed by: GENERAL PRACTICE

## 2024-12-12 PROCEDURE — 99214 OFFICE O/P EST MOD 30 MIN: CPT | Performed by: GENERAL PRACTICE

## 2024-12-12 RX ORDER — MAGNESIUM CARB/ALUMINUM HYDROX 105-160MG
1 TABLET,CHEWABLE ORAL 3 TIMES DAILY
Qty: 90 TABLET | Refills: 3 | Status: SHIPPED | OUTPATIENT
Start: 2024-12-12

## 2024-12-12 NOTE — PROGRESS NOTES
Otolaryngology - Head and Neck Surgery Outpatient New Patient Visit Note        Assessment/Plan:   Problem List Items Addressed This Visit             ICD-10-CM       Pulmonary and Pneumonias    Cavitary lesion of lung J98.4     Other Visit Diagnoses         Codes    Chronic cough    -  Primary R05.3    Chronic GERD     K21.9    Relevant Medications    aluminum hydrox-magnesium carb (Gaviscon Extra Strength) 160-105 mg tablet,chewable    Sphenoid sinusitis, unspecified chronicity     J32.3            74yoF with chronic cough, concerns for possible sinus etiology.    Mild L sphenoid mucosal debris on CT.   No apparent drainage visible from sinuses on nasal endoscopy.  LPR changes notable on NP scope and pt with history of GERD.  Notes breakthrough GERD symptoms and reflux noted in throat despite use of BID pepcid.   Discussed possible LPR contribution to cough, and medical management options.  Discussed conservative management of reflux, and risks of long term anti-reflux medications.  Discussed avoidance of triggers, dietary and behavioral management strategies for reflux.  Discussed possible referral to GI for further testing and evaluation.  Will trial   medical therapy (combination gaviscon and H2 blockers) for 1-2 months and assess for symptom response.  Plan for taper of medical management to least possible to control symptoms, in favor of using dietary/behavioral controls.           Follow up:  -plan for follow up in clinic as needed and in 1-2 months    All of the above findings, impressions, treatment planning and follow up plans were discussed with the patient who indicated understanding.  the patient was instructed to contact or return to clinic sooner if symptoms/signs persist or worsen despite the above management.      Enrico Liu MD  Otolaryngology - Head and Neck Surgery            History Of Present Illness  Abad Yan is a 74 y.o. female presenting for bothersome chronic cough.   Notes  months/year of intermittent cough causing coughing fits.    Notes no inciting illness/trauma, but pt with known R upper lobe cavitary lung     Noted to have some sphenoid sinus inflammation on recent CT, question about contributing to chronicc ough.    Notes a history of mild congesion and some PND. But no sinus pain/pressure and rare acute sinusitis.  No significant allergic rhinitis.    Reports a history of GERD, poorly controlled, with intermittent hoang reflux into throat.     Recall    Abad Yan is a 74 y.o. female presenting for follow up of hearing loss as well as discussion left neck fullness/mass.      Reports no chagne in hearing.  Recent audiogram 10/4/24 shows no changes from last year.     Pt reports left high neck (tail of parotid region) mass/fullness which has been present for >2-3.  No inciting illness/trauma recalled  Reports mild increase in size over time,  Mild waxing/waning with URI symptoms.    Minimally TTP.        No dysphagia, odynophagia, dysphonia, dyspnea.  No oral lesions or masses.  No sore throat or globus sensation.  No otalgia, aural fullness or hearing change.  No new nasal obstruction or epistaxis.  No other neck masses or lumps.  No unintentional weight loss, night sweats, F/C, N/V or systemic symptoms of toxicity or malignancy.      Recall     72yoF presents for evaluation of left sided hearing loss.      The patient reports slow progression of the hearing loss over time.  The patient denies tinnitus.  The patient denies a history of significant noise exposure due to occupational exposures, industrial noise, etc.   The patient denies sudden changes in hearing.  The patient denies otalgia, otorrhea, vertigo, or facial weakness.   The patient denies a history of otologic surgery or trauma.  The patient denies a history of blast injury, TBI or concussion associated with hearing loss.  The patient denies a family history of significant hearing loss.  The patient reports  a history of AOM as a child, but no recent significant history of ear infection.           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, Cavitary lesion of lung (08/05/2024), Chest pain (02/12/2024), Chronic headache, Chronic rhinitis, Contact with and (suspected) exposure to pediculosis, acariasis and other infestations (07/24/2014), COPD (chronic obstructive pulmonary disease) (Multi), Cramps of lower extremity (02/12/2024), Depression, Disorder of kidney and ureter, unspecified (01/19/2016), Encounter for screening mammogram for malignant neoplasm of breast (12/09/2015), Fall due to slipping on ice or snow (02/12/2024), GERD (gastroesophageal reflux disease), Headache, unspecified (06/28/2017), Hemoptysis (10/25/2019), History of cardiovascular disorder (02/12/2024), Hyperlipidemia, Hypotension determined by examination (03/14/2024), Hypothyroidism, Immunodeficiency (Multi), Leukopenia, Menopausal and female climacteric states (07/21/2015), Migraine, Nausea (02/12/2024), Nondisplaced fracture of neck of right radius, initial encounter for closed fracture (05/08/2019), Osteopenia, Other conditions influencing health status, Other conditions influencing health status, Pain in wrist (02/12/2024), Palpitations (09/13/2023), Pancreatic cyst ([facility]), Personal history of diseases of the blood and blood-forming organs and certain disorders involving the immune mechanism (05/27/2022), Personal history of other diseases of the circulatory system (12/07/2018), Personal history of other diseases of the circulatory system (11/05/2018), Personal history of other diseases of the circulatory system (12/07/2018), Personal history of other diseases of the circulatory system, Personal  history of other diseases of the female genital tract (07/21/2015), Personal history of other diseases of the female genital tract, Personal history of other diseases of the respiratory system (08/05/2013), Personal history of other diseases of the respiratory system (01/27/2016), Personal history of other diseases of the respiratory system (09/12/2013), Personal history of other diseases of the respiratory system (12/16/2013), Personal history of other diseases of the respiratory system (08/05/2013), Personal history of other diseases of urinary system (03/14/2022), Personal history of other specified conditions (03/10/2016), Personal history of other specified conditions (01/06/2020), Personal history of other specified conditions (11/21/2018), Pneumonia, unspecified organism (11/06/2015), Pseudomonas aeruginosa infection (02/12/2024), Rash (02/12/2024), Rash and other nonspecific skin eruption (12/08/2014), Restless legs, Suspected sleep apnea, Trochanteric bursitis, unspecified hip (04/18/2014), Urinary tract infection, site not specified (02/10/2022), and Uterine prolapse.    Surgical History  She has a past surgical history that includes Hernia repair (10/08/2012); Dilation and curettage of uterus (10/08/2012); and Bronchoscopy.     Social History  She reports that she quit smoking about 47 years ago. Her smoking use included cigarettes. She started smoking about 57 years ago. She has a 15 pack-year smoking history. She has never used smokeless tobacco. She reports that she does not currently use alcohol. She reports that she does not use drugs.    Family History  Family History   Problem Relation Name Age of Onset    Cancer Brother      Other (cardiovascular disorder) Brother      Allergies Other      Cancer Other      Hearing loss Other      Hypertension Other      Other (pulmonary disease) Other      Breast cancer Father's Sister  90        Allergies  Citalopram, Doxepin, Fluoxetine, Nortriptyline, and  Sertraline    Review of Systems  ROS: Pertinent positives as noted in HPI.    - CONSTITUTIONAL: Does not report weight loss, fever or chills.    - HEENT:   Ear: Does not report tinnitus, vertigo, hearing loss, otalgia, otorrhea  Nose: Does not report  ,  , epistaxis, decreased smell  Throat: Does not report pain, dysphagia, odynophagia  Larynx: Does not report hoarseness,  difficulty breathing, pain with speaking (odynophonia)  Neck: Does not report new masses, pain, swelling  Face: Does not report sinus pain, pressure, swelling, numbness, weakness     - RESPIRATORY: Does not report SOB or  .    - CV: Does not report palpitations or chest pain.     - GI: Does not report abdominal pain, nausea, vomiting or diarrhea.    - : Does not report dysuria or urinary frequency.    - MSK: Does not report myalgia or joint pain.    - SKIN: Does not report rash or pruritus.    - NEUROLOGICAL: Does not report headache or syncope.    - PSYCHIATRIC: Does not report recent changes in mood. Does not report anxiety or depression.         Physical Exam:     GENERAL:   Alert & Oriented to person, place and time; Normal affect and appearance. Well developed and well nourished. Conversant & cooperative with examination.     HEAD:   Normocephalic, atraumatic. No sinus tenderness to palpation. Normal parotid bilaterally. Normal facial strength.     NEUROLOGIC:   Cranial nerves II-XII grossly intact, gait WNL. Normal mood and affect.    EYES:   Extraocular movements intact. Pupils equal, round, reactive to light and accommodation. No nystagmus, no ptosis. no scleral injection.    EAR:   Normal auricle. No discomfort or TTP with manipulation.   Handheld otoscopic exam showed normal external auditory canals bilaterally. No purulence or EAC inflammation. Minimal cerumen.   Right tympanic membrane clear and mobile without evidence of perforation, retraction or middle ear effusion.   Left tympanic membrane clear and mobile without evidence of  perforation, retraction or middle ear effusion.     NOSE:   No external deformity. No external nasal lesions, lacerations, or scars. Nasal tip symmetrical with normal nasal valves.   Nasal cavity with essentially midline septum, normal mucosa and turbinates. No lesions, masses, purulence or polyps.   No evidence of purulent drainage or polyps in posterior nasal exam.  R sphenoid os visible without patholgoy.  Left sphenoid os not visualized, but no associated inflammatory change.    Mild nasopharyngeal erythema/cobblestoning.     OC/OP:   Mucous membranes moist, no masses, lesions or exudates.   Normal tongue, floor of mouth, teeth, gums, lips. Cobblestoning of  posterior pharyngeal wall.    Normal tonsils without erythema, exudate or obvious calculi     NECK:   No neck masses or thyroid enlargement. Trachea midline. No tenderness to palpation    LYMPHATIC:   No cervical lymphadenopathy.     RESPIRATORY:   Symmetric chest elevation & no retractions. No significant hoarseness. No increased work of breathing.    CV:   No clubbing or cyanosis. No obvious edema    Skin:   No facial rashes, vesicles or lesions.     Extremities:   No gross abnormalities      Clinic Procedure    Nasal Endoscopy Laryngoscopy Nasophrayngosocopy   Procedure: flexible fiberoptic laryngoscopy, nasopharyngoscopy.   Flexible Fiberoptic Laryngoscopy Indication:   inability to tolerate mirror exam     Risks, benefits, and alternatives, infection risk, bleeding risk and risk of mucosal trauma and pain were discussed with the patient. Verbal consent was obtained prior to the procedure and is detailed in the patient's record.     Procedure Note:      With the patient seated in the exam chair, the bilateral nasal cavity was topically treated with 4% lidocaine and phenylephrine.  The bilateral nasal cavity was intubated with the flexible laryngoscope.   Nasal Endoscopy: Nasal endoscopy was successfully completed using the endoscope and the nasal mucosa,  septum, turbinates, and osteomeatal complex were examined.  Nasopharyngoscopy: Nasopharyngoscopy was successfully completed using the endoscope and the nasal mucosa, septum, turbinates, osteomeatal complex, and nasopharynx were examined.   Laryngoscopy: Laryngoscopy was successfully completed using the flexible laryngoscope and the nasopharynx, hypopharynx, and larynx were examined.  Patient Status: the patient tolerated the procedure well.   Complications: there were no complications.      . After obtaining adequate decongestion and anesthesia, the scope was introduced into the floor of the nasal cavity. The septum, inferior, and middle turbinates were without any mucosal lesions.  The Fossa of [place]mu[place] were clear bilaterally, and good velopharyngeal closure was obtained on phonation.  Base of tongue, tonsillar complex, and posterior pharyngeal wall free of asymmetry or concerning ulcerations or masses.  supraglottic segments with edema, pachydermia and erythema at the esophageal opening and posterior supraglottis.  scattered mucous debris.  No mucosal ulcerations or masses.  True vocal cords abduct and adduct normally with no mucosal or mass lesions.  No masses visualized in the pyriform recesses.  The scope was removed and patient tolerated the procedure well.      Information review:  External sources (notes, imaging, lab results) listed below personally reviewed to aid in medical decision making process.  - CT neck 11/13/24  -ED note [place] 12/2/24  -Cardio note [place] 11/12/24

## 2024-12-15 DIAGNOSIS — E03.9 HYPOTHYROIDISM, UNSPECIFIED TYPE: ICD-10-CM

## 2024-12-16 RX ORDER — LEVOTHYROXINE SODIUM 75 UG/1
75 TABLET ORAL
Qty: 100 TABLET | Refills: 1 | Status: SHIPPED | OUTPATIENT
Start: 2024-12-16

## 2025-01-07 ENCOUNTER — OFFICE VISIT (OUTPATIENT)
Dept: PULMONOLOGY | Facility: HOSPITAL | Age: 75
End: 2025-01-07
Payer: MEDICARE

## 2025-01-07 VITALS
OXYGEN SATURATION: 95 % | DIASTOLIC BLOOD PRESSURE: 72 MMHG | TEMPERATURE: 96.3 F | HEART RATE: 89 BPM | WEIGHT: 128 LBS | SYSTOLIC BLOOD PRESSURE: 110 MMHG | BODY MASS INDEX: 20.05 KG/M2

## 2025-01-07 DIAGNOSIS — A49.8 PSEUDOMONAS AERUGINOSA INFECTION: Primary | ICD-10-CM

## 2025-01-07 DIAGNOSIS — J47.1 BRONCHIECTASIS WITH ACUTE EXACERBATION (MULTI): ICD-10-CM

## 2025-01-07 PROCEDURE — 99213 OFFICE O/P EST LOW 20 MIN: CPT | Performed by: INTERNAL MEDICINE

## 2025-01-07 PROCEDURE — 1159F MED LIST DOCD IN RCRD: CPT | Performed by: INTERNAL MEDICINE

## 2025-01-07 PROCEDURE — 1157F ADVNC CARE PLAN IN RCRD: CPT | Performed by: INTERNAL MEDICINE

## 2025-01-07 PROCEDURE — 1126F AMNT PAIN NOTED NONE PRSNT: CPT | Performed by: INTERNAL MEDICINE

## 2025-01-07 ASSESSMENT — PAIN SCALES - GENERAL: PAINLEVEL_OUTOF10: 0-NO PAIN

## 2025-01-07 NOTE — PATIENT INSTRUCTIONS
Thank you for allowing me to participate in your health care today.    The primary respiratory problem that we discussed is bronchiectasis and recent pseudomonas infection  Keep using 3% saline nebulizer just prior to the vest therapy after an albuterol treatment  Vest therapy 2-3 times a day  chest CT ordered -to be done soon prior to the visit with Dr Galicia  Check sputum culture for bacteria  Check sputum culture for AFB  Follow-up in this clinic in March pulmonary function tests to be done then on the same day of the visit    Please call [phone] ([facility] [place], [place]) to get your tests scheduled.     Please call [phone] to schedule a follow up appointment with me.    Unless deemed urgent or emergent, the result(s) of any tests ordering during this clinic visit will be reviewed during your follow-up visit. Depending on the urgency of the result(s), I may call or send you a [employer] message.

## 2025-01-07 NOTE — PROGRESS NOTES
Subjective   Patient ID: Abad Yan is a 74 y.o. female who presents for pt epv    HPI  Last visit in this clinic was 08/20/24    Abad Yan is a 74 y.o. 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 Given the resistance pattern of this strain IV therapy was given. A 14-day course of cefepime. Her course was complicated acute left DVT of axillary/brachial vein + SVT basilic vein related to the PICC line. She completed Eliquis.     Since the last visit, her sleep improved with but she continue to wake up at least once. She completed antibiotic course about 6 weeks ago. She continues to have a cough with mild hemoptysis. She feels the sputum is decreasing. She notes chest tightness and shortness of breath.    Review of Systems  Review of Pertinent Systems:  Constitutional: no chills, no fever, no fatigue.  Eyes: no blurred vision and no dryness of the eyes.   ENT: no nasal congestion, no hoarseness, no sore throat, no postnasal drip.  Cardiovascular: as per HPI   Respiratory: as per HPI  Gastrointestinal: no nausea and no vomiting. No diarrhea or constipation. No reflux.  Integumentary: no rashes.      Objective   Physical Exam  Vitals:    01/07/25 1417   BP: 110/72   Pulse: 89   Temp: 35.7 °C (96.3 °F)   SpO2: 95%     GENERAL: Normal appearance. Well nourished. No respiratory distress   HEAD/SINUSES: No sinus tenderness.   OROPHARYNX: Moist mucosa, no thrush or lesions - Mallampati ***   NECK: No JVD, midline trachea without stridor.   LYMPH NODES: None felt in the cervical, submandibular, or supraclavicular regions.   LUNGS: Symmetric chest. Normal inflation. Good excursion. Equal breath sounds bilaterally. Clear lung fields anteriorly and posteriorly with no wheeze, crackles, or rhonchi.    CARDIAC: Normal S1 and S2; no gallops, rubs or murmurs. Regular rate and rhythm.   ABDOMEN: Abdomen soft, non-tender. Non-distended. BS normal.   EXTREMITIES: No edema.   NEURO: Grossly normal mental status, CNs, reflexes, and motor strength.   SKIN: Skin turgor normal. No rashes or lesions.    PSYCH: Normal affect.       AVAILABLE DATA - this represents my personal interpretation.   10/02/24 CT Chest  IMPRESSION:  1. Similar appearance of bronchiectasis, bronchial wall thickening,  mucous plugging, and tree-in-bud opacities predominantly affecting  the right upper lobe, anterior right middle lobe, lingula, and  superior right lower lobe. A dominant right upper lobe cavitary  lesion demonstrates increasing component of cavitation compared to  the prior CT chest. There is also interval development of the new  small cavitary lesion within the right upper lobe. Findings are in  keeping with patient's chronic airway disease with endobronchial  spread and correlate with mycobacterial infection. Can not exclude  superimposed bacterial infectious process.  2. Slightly improved consolidation in the right upper lung adjacent  to the previously described cavitary lesion.    Assessment/Plan   Diagnoses and all orders for this visit:  Pseudomonas aeruginosa infection  -     Follow Up In Pulmonology  Bronchiectasis with acute exacerbation (Multi)  -     Follow Up In Pulmonology            Scribe Attestation  By signing my name below, IBrandon Scribe   attest that this documentation has been prepared under the direction and in the presence of Crissy Gil MD.      attest that this documentation has been prepared under the direction and in the presence of Crissy Gil MD.

## 2025-01-08 ENCOUNTER — HOSPITAL ENCOUNTER (OUTPATIENT)
Dept: RADIOLOGY | Facility: CLINIC | Age: 75
Discharge: HOME | End: 2025-01-08
Payer: MEDICARE

## 2025-01-08 ENCOUNTER — TELEPHONE (OUTPATIENT)
Dept: PULMONOLOGY | Facility: HOSPITAL | Age: 75
End: 2025-01-08
Payer: MEDICARE

## 2025-01-08 ENCOUNTER — LAB (OUTPATIENT)
Dept: LAB | Facility: LAB | Age: 75
End: 2025-01-08
Payer: MEDICARE

## 2025-01-08 DIAGNOSIS — J47.1 BRONCHIECTASIS WITH ACUTE EXACERBATION (MULTI): ICD-10-CM

## 2025-01-08 DIAGNOSIS — A49.8 PSEUDOMONAS AERUGINOSA INFECTION: ICD-10-CM

## 2025-01-08 LAB
BACTERIA SPEC RESP CULT: ABNORMAL
GRAM STN SPEC: ABNORMAL
HOLD SPECIMEN: NORMAL

## 2025-01-08 PROCEDURE — 87205 SMEAR GRAM STAIN: CPT

## 2025-01-08 PROCEDURE — 87116 MYCOBACTERIA CULTURE: CPT | Performed by: INTERNAL MEDICINE

## 2025-01-08 PROCEDURE — 71250 CT THORAX DX C-: CPT

## 2025-01-08 PROCEDURE — 71250 CT THORAX DX C-: CPT | Performed by: RADIOLOGY

## 2025-01-08 PROCEDURE — 87116 MYCOBACTERIA CULTURE: CPT

## 2025-01-08 NOTE — TELEPHONE ENCOUNTER
Per Dr. Gil, pt needs HRCT completed prior to appointment with Dr. Galicia. Order changed to stat.

## 2025-01-09 ENCOUNTER — TELEPHONE (OUTPATIENT)
Dept: PULMONOLOGY | Facility: HOSPITAL | Age: 75
End: 2025-01-09

## 2025-01-09 ENCOUNTER — APPOINTMENT (OUTPATIENT)
Dept: PRIMARY CARE | Facility: CLINIC | Age: 75
End: 2025-01-09
Payer: MEDICARE

## 2025-01-09 ENCOUNTER — APPOINTMENT (OUTPATIENT)
Dept: LAB | Facility: LAB | Age: 75
End: 2025-01-09
Payer: MEDICARE

## 2025-01-09 VITALS
HEIGHT: 67 IN | RESPIRATION RATE: 16 BRPM | DIASTOLIC BLOOD PRESSURE: 61 MMHG | SYSTOLIC BLOOD PRESSURE: 104 MMHG | BODY MASS INDEX: 19.62 KG/M2 | HEART RATE: 84 BPM | OXYGEN SATURATION: 97 % | WEIGHT: 125 LBS

## 2025-01-09 DIAGNOSIS — G25.81 RESTLESS LEGS SYNDROME: ICD-10-CM

## 2025-01-09 DIAGNOSIS — F33.9 DEPRESSION, RECURRENT (CMS-HCC): ICD-10-CM

## 2025-01-09 DIAGNOSIS — H35.3221 EXUDATIVE AGE-RELATED MACULAR DEGENERATION OF LEFT EYE WITH ACTIVE CHOROIDAL NEOVASCULARIZATION: Primary | ICD-10-CM

## 2025-01-09 DIAGNOSIS — J47.1 BRONCHIECTASIS WITH ACUTE EXACERBATION (MULTI): ICD-10-CM

## 2025-01-09 DIAGNOSIS — E87.6 HYPOKALEMIA: ICD-10-CM

## 2025-01-09 DIAGNOSIS — J47.9 BRONCHIECTASIS WITHOUT ACUTE EXACERBATION (MULTI): ICD-10-CM

## 2025-01-09 DIAGNOSIS — E78.5 DYSLIPIDEMIA: ICD-10-CM

## 2025-01-09 DIAGNOSIS — D83.9 COMMON VARIABLE IMMUNODEFICIENCY: ICD-10-CM

## 2025-01-09 DIAGNOSIS — E03.9 HYPOTHYROIDISM, UNSPECIFIED TYPE: ICD-10-CM

## 2025-01-09 PROCEDURE — 99214 OFFICE O/P EST MOD 30 MIN: CPT | Performed by: INTERNAL MEDICINE

## 2025-01-09 PROCEDURE — 1036F TOBACCO NON-USER: CPT | Performed by: INTERNAL MEDICINE

## 2025-01-09 PROCEDURE — G2211 COMPLEX E/M VISIT ADD ON: HCPCS | Performed by: INTERNAL MEDICINE

## 2025-01-09 PROCEDURE — 1159F MED LIST DOCD IN RCRD: CPT | Performed by: INTERNAL MEDICINE

## 2025-01-09 PROCEDURE — 1157F ADVNC CARE PLAN IN RCRD: CPT | Performed by: INTERNAL MEDICINE

## 2025-01-09 PROCEDURE — 1160F RVW MEDS BY RX/DR IN RCRD: CPT | Performed by: INTERNAL MEDICINE

## 2025-01-09 RX ORDER — ROSUVASTATIN CALCIUM 5 MG/1
TABLET, COATED ORAL
Qty: 90 TABLET | Refills: 1 | Status: SHIPPED | OUTPATIENT
Start: 2025-01-09

## 2025-01-09 RX ORDER — LEVOTHYROXINE SODIUM 75 UG/1
75 TABLET ORAL
Qty: 100 TABLET | Refills: 1 | Status: SHIPPED | OUTPATIENT
Start: 2025-01-09

## 2025-01-09 RX ORDER — ESCITALOPRAM OXALATE 5 MG/1
TABLET ORAL
Qty: 90 TABLET | Refills: 1 | Status: SHIPPED | OUTPATIENT
Start: 2025-01-09

## 2025-01-09 RX ORDER — CALCIUM CARBONATE 200(500)MG
1 TABLET,CHEWABLE ORAL 3 TIMES DAILY
COMMUNITY

## 2025-01-09 RX ORDER — ROPINIROLE 4 MG/1
4 TABLET, FILM COATED ORAL NIGHTLY
Qty: 90 TABLET | Refills: 1 | Status: SHIPPED | OUTPATIENT
Start: 2025-01-09

## 2025-01-09 RX ORDER — POTASSIUM CHLORIDE 750 MG/1
CAPSULE, EXTENDED RELEASE ORAL
Qty: 90 CAPSULE | Refills: 1 | Status: SHIPPED | OUTPATIENT
Start: 2025-01-09

## 2025-01-09 ASSESSMENT — COLUMBIA-SUICIDE SEVERITY RATING SCALE - C-SSRS
2. HAVE YOU ACTUALLY HAD ANY THOUGHTS OF KILLING YOURSELF?: NO
6. HAVE YOU EVER DONE ANYTHING, STARTED TO DO ANYTHING, OR PREPARED TO DO ANYTHING TO END YOUR LIFE?: NO
1. IN THE PAST MONTH, HAVE YOU WISHED YOU WERE DEAD OR WISHED YOU COULD GO TO SLEEP AND NOT WAKE UP?: NO

## 2025-01-09 ASSESSMENT — ENCOUNTER SYMPTOMS
DEPRESSION: 0
CONSTIPATION: 0
CHEST TIGHTNESS: 0
OCCASIONAL FEELINGS OF UNSTEADINESS: 0
BLOOD IN STOOL: 0
LOSS OF SENSATION IN FEET: 0
CHILLS: 0
FEVER: 0
DIAPHORESIS: 0

## 2025-01-09 NOTE — PROGRESS NOTES
"Subjective   Patient ID: ELIANE Yan is a 74 y.o. female who presents for  thyroid, cholesterol,  AD (Adjustment Disorder).    HPI   Pt has active infection and her ct lungs from yesterday showed  increase in size of cavitary  lesions. She feels very encouraged by her pulmonologist. She does ot feel depressed. She enjoyed holidays with family  When she feels down she needs to take a nap day time, that she is currently naping  every four day  She started gaviscan every 8 hours that improved her cough at night time, recommended by ENT. No need for antibiotic for sinus at that moment.  Brings cbc, alt/ast, gfr, wnl from this month  Resless well control    Review of Systems   Constitutional:  Negative for chills, diaphoresis and fever.   Respiratory:  Negative for chest tightness.         Sob at exertion after 1 flight of stairs   Cardiovascular:  Negative for leg swelling.   Gastrointestinal:  Negative for blood in stool and constipation.   Musculoskeletal:         Neck stiffness with referred headaches, that she is able to control doing home physical therapy.  bilateral knee aches related to cold weather.    All other systems reviewed and are negative.      Objective   /61   Pulse 84   Resp 16   Ht 1.702 m (5' 7\")   Wt 56.7 kg (125 lb)   LMP  (LMP Unknown)   SpO2 97%   BMI 19.58 kg/m²     Physical Exam  Weight stable  Ocassional wet cough  Eyes - conjunctivae clear, PERRLA  HEENT -  no sinus tenderness,   Neck - no cervical lymphadenopathy, no thyromegaly  Axilla - no palpable lymphadenopathy  Cardiac- regular rate and rhythm, no murmurs, no carotid bruit, no JVP  Lung - clear to auscultation, no rales, no rhonchi, no wheezing  GI - normally active bowel sounds, non tender, non distended, no hepatosplenomegaly, no rebound  MSK - non deformities, rom of bilateral knee preserve  Extremities - no edema, good distal pulses  Neuro - non focal, oriented x 3  Skin - no bruises, no rashes  Psychiatric " - pleasant, well groom, no hallucinations    Assessment/Plan   Problem List Items Addressed This Visit             ICD-10-CM    Common variable immunodeficiency D83.9     Continue inmunology follow up         Depression, recurrent (CMS-HCC) F33.9    Relevant Medications    escitalopram (Lexapro) 5 mg tablet    Dyslipidemia E78.5     On goal, continue same statin         Relevant Medications    rosuvastatin (Crestor) 5 mg tablet    Hypokalemia E87.6     Well control on current prescription         Relevant Medications    potassium chloride ER (Micro-K) 10 mEq ER capsule    Hypothyroidism E03.9     Continue same levothyroxine         Relevant Medications    levothyroxine (Synthroid, Levoxyl) 75 mcg tablet    Restless legs syndrome G25.81     No change in meds         Relevant Medications    rOPINIRole (Requip) 4 mg tablet    Exudative age-related macular degeneration of left eye with active choroidal neovascularization - Primary H35.3221     Other Visit Diagnoses         Codes    Bronchiectasis without acute exacerbation (Multi)     J47.9

## 2025-01-09 NOTE — PATIENT INSTRUCTIONS
Continue current medications from my office. Enjoy your time in [place]. Return here 3 m for wellness

## 2025-01-09 NOTE — TELEPHONE ENCOUNTER
Pt's AFB sample from yesterday contains significant salivary contamination. A new order was placed.   
No

## 2025-01-10 ENCOUNTER — TELEPHONE (OUTPATIENT)
Dept: PULMONOLOGY | Facility: HOSPITAL | Age: 75
End: 2025-01-10
Payer: MEDICARE

## 2025-01-10 ENCOUNTER — LAB (OUTPATIENT)
Dept: LAB | Facility: LAB | Age: 75
End: 2025-01-10
Payer: MEDICARE

## 2025-01-10 DIAGNOSIS — J47.1 BRONCHIECTASIS WITH ACUTE EXACERBATION (MULTI): ICD-10-CM

## 2025-01-10 PROCEDURE — 87077 CULTURE AEROBIC IDENTIFY: CPT

## 2025-01-10 PROCEDURE — 87070 CULTURE OTHR SPECIMN AEROBIC: CPT

## 2025-01-10 PROCEDURE — 87186 SC STD MICRODIL/AGAR DIL: CPT

## 2025-01-10 PROCEDURE — 87075 CULTR BACTERIA EXCEPT BLOOD: CPT

## 2025-01-10 PROCEDURE — 87205 SMEAR GRAM STAIN: CPT

## 2025-01-10 NOTE — TELEPHONE ENCOUNTER
Pt called needing a new order for her respiratory sputum sample. Her last sputum culture was contaminated with saliva. New order was placed. Pt stated that she is dropping off her culture to the lab this morning.

## 2025-01-13 ENCOUNTER — LAB (OUTPATIENT)
Dept: LAB | Facility: LAB | Age: 75
End: 2025-01-13
Payer: MEDICARE

## 2025-01-13 ENCOUNTER — TELEPHONE (OUTPATIENT)
Dept: OBSTETRICS AND GYNECOLOGY | Facility: CLINIC | Age: 75
End: 2025-01-13

## 2025-01-13 DIAGNOSIS — J47.1 BRONCHIECTASIS WITH ACUTE EXACERBATION (MULTI): ICD-10-CM

## 2025-01-13 LAB
HOLD SPECIMEN: NORMAL
SCAN RESULT: NORMAL

## 2025-01-13 PROCEDURE — 87206 SMEAR FLUORESCENT/ACID STAI: CPT

## 2025-01-13 PROCEDURE — 87015 SPECIMEN INFECT AGNT CONCNTJ: CPT

## 2025-01-13 PROCEDURE — 87116 MYCOBACTERIA CULTURE: CPT

## 2025-01-13 PROCEDURE — 87116 MYCOBACTERIA CULTURE: CPT | Performed by: INTERNAL MEDICINE

## 2025-01-14 ENCOUNTER — LAB (OUTPATIENT)
Dept: LAB | Facility: LAB | Age: 75
End: 2025-01-14
Payer: MEDICARE

## 2025-01-14 LAB — HOLD SPECIMEN: NORMAL

## 2025-01-14 PROCEDURE — 87116 MYCOBACTERIA CULTURE: CPT

## 2025-01-14 PROCEDURE — 87116 MYCOBACTERIA CULTURE: CPT | Performed by: INTERNAL MEDICINE

## 2025-01-16 LAB
ACID FAST STN SPEC: NORMAL
BACTERIA SPEC RESP CULT: ABNORMAL
BACTERIA SPEC RESP CULT: ABNORMAL
GRAM STN SPEC: ABNORMAL
GRAM STN SPEC: ABNORMAL
MYCOBACTERIUM SPEC CULT: NORMAL

## 2025-01-17 DIAGNOSIS — K21.00 GASTROESOPHAGEAL REFLUX DISEASE WITH ESOPHAGITIS WITHOUT HEMORRHAGE: ICD-10-CM

## 2025-01-17 DIAGNOSIS — N95.2 VAGINAL ATROPHY: ICD-10-CM

## 2025-01-17 LAB
ACID FAST STN SPEC: NORMAL
MYCOBACTERIUM SPEC CULT: NORMAL
SCAN RESULT: NORMAL

## 2025-01-17 RX ORDER — FAMOTIDINE 20 MG/1
TABLET, FILM COATED ORAL
Qty: 180 TABLET | Refills: 1 | Status: SHIPPED | OUTPATIENT
Start: 2025-01-17

## 2025-01-17 RX ORDER — ESTRADIOL 0.1 MG/G
1 CREAM VAGINAL 2 TIMES WEEKLY
Qty: 42.5 G | Refills: 3 | Status: SHIPPED | OUTPATIENT
Start: 2025-01-20

## 2025-01-17 NOTE — TELEPHONE ENCOUNTER
Request received for   Requested Prescriptions     Pending Prescriptions Disp Refills    estradiol (Estrace) 0.01 % (0.1 mg/gram) vaginal cream 42.5 g 3     Sig: Insert 0.25 Applicatorfuls (1 g) into the vagina 2 times a week.       Abad Yan was last seen 3/11/2024 and has an appt for 3/31/2025.

## 2025-01-18 LAB — SCAN RESULT: NORMAL

## 2025-01-19 ENCOUNTER — TELEPHONE (OUTPATIENT)
Dept: CARDIOLOGY | Facility: HOSPITAL | Age: 75
End: 2025-01-19
Payer: MEDICARE

## 2025-01-19 DIAGNOSIS — I82.A12 DVT OF AXILLARY VEIN, ACUTE LEFT (MULTI): ICD-10-CM

## 2025-01-19 DIAGNOSIS — I82.612 ACUTE THROMBOSIS OF LEFT BASILIC VEIN: ICD-10-CM

## 2025-01-20 LAB
ACID FAST STN SPEC: NORMAL
MYCOBACTERIUM SPEC CULT: NORMAL

## 2025-01-20 RX ORDER — FAMOTIDINE 20 MG/1
TABLET, FILM COATED ORAL
Qty: 180 TABLET | Refills: 1 | Status: SHIPPED | OUTPATIENT
Start: 2025-01-20

## 2025-01-23 ENCOUNTER — TELEPHONE (OUTPATIENT)
Dept: PULMONOLOGY | Facility: HOSPITAL | Age: 75
End: 2025-01-23
Payer: MEDICARE

## 2025-01-23 DIAGNOSIS — J47.9 BRONCHIECTASIS WITHOUT COMPLICATION (MULTI): ICD-10-CM

## 2025-01-23 RX ORDER — ALBUTEROL SULFATE 0.83 MG/ML
2.5 SOLUTION RESPIRATORY (INHALATION) 2 TIMES DAILY
Qty: 540 ML | Refills: 3 | Status: SHIPPED | OUTPATIENT
Start: 2025-01-23

## 2025-01-23 NOTE — TELEPHONE ENCOUNTER
Pt reached out needing a refill on her albuterol nebulizer solution to [facility] pharmacy. Refill order placed and routed to Dr. Gil to sign.

## 2025-01-28 ENCOUNTER — APPOINTMENT (OUTPATIENT)
Dept: PULMONOLOGY | Facility: HOSPITAL | Age: 75
End: 2025-01-28
Payer: MEDICARE

## 2025-01-31 ENCOUNTER — TELEPHONE (OUTPATIENT)
Dept: PULMONOLOGY | Facility: HOSPITAL | Age: 75
End: 2025-01-31
Payer: MEDICARE

## 2025-01-31 DIAGNOSIS — J47.9 BRONCHIECTASIS WITHOUT COMPLICATION (MULTI): ICD-10-CM

## 2025-01-31 RX ORDER — ALBUTEROL SULFATE 0.83 MG/ML
2.5 SOLUTION RESPIRATORY (INHALATION) 2 TIMES DAILY
Qty: 540 ML | Refills: 3 | Status: SHIPPED | OUTPATIENT
Start: 2025-01-31

## 2025-01-31 NOTE — TELEPHONE ENCOUNTER
Pt reached out needing her albuterol refill to get sent to [facility] of [facility]. Order placed and routed to Dr. Gil to sign.

## 2025-02-05 DIAGNOSIS — D37.8 NEOPLASM OF UNCERTAIN BEHAVIOR OF PANCREAS: ICD-10-CM

## 2025-02-05 DIAGNOSIS — K86.2 PANCREATIC CYST (HHS-HCC): ICD-10-CM

## 2025-03-05 DIAGNOSIS — K86.2 PANCREATIC CYST (HHS-HCC): ICD-10-CM

## 2025-03-05 DIAGNOSIS — D37.8 NEOPLASM OF UNCERTAIN BEHAVIOR OF PANCREAS: ICD-10-CM

## 2025-03-07 ENCOUNTER — HOSPITAL ENCOUNTER (OUTPATIENT)
Dept: RADIOLOGY | Facility: CLINIC | Age: 75
Discharge: HOME | End: 2025-03-07
Payer: MEDICARE

## 2025-03-07 ENCOUNTER — APPOINTMENT (OUTPATIENT)
Dept: RADIOLOGY | Facility: HOSPITAL | Age: 75
End: 2025-03-07
Payer: MEDICARE

## 2025-03-07 DIAGNOSIS — K86.2 PANCREATIC CYST (HHS-HCC): ICD-10-CM

## 2025-03-07 PROCEDURE — 74183 MRI ABD W/O CNTR FLWD CNTR: CPT

## 2025-03-07 PROCEDURE — 2550000001 HC RX 255 CONTRASTS: Performed by: SURGERY

## 2025-03-07 PROCEDURE — A9575 INJ GADOTERATE MEGLUMI 0.1ML: HCPCS | Performed by: SURGERY

## 2025-03-07 RX ORDER — GADOTERATE MEGLUMINE 376.9 MG/ML
12 INJECTION INTRAVENOUS
Status: COMPLETED | OUTPATIENT
Start: 2025-03-07 | End: 2025-03-07

## 2025-03-07 RX ADMIN — GADOTERATE MEGLUMINE 12 ML: 376.9 INJECTION INTRAVENOUS at 10:54

## 2025-03-10 ENCOUNTER — APPOINTMENT (OUTPATIENT)
Dept: OTOLARYNGOLOGY | Facility: CLINIC | Age: 75
End: 2025-03-10
Payer: MEDICARE

## 2025-03-10 ENCOUNTER — LAB (OUTPATIENT)
Dept: LAB | Facility: HOSPITAL | Age: 75
End: 2025-03-10
Payer: MEDICARE

## 2025-03-10 VITALS — WEIGHT: 121 LBS | BODY MASS INDEX: 18.95 KG/M2

## 2025-03-10 DIAGNOSIS — K86.2 PANCREATIC CYST (HHS-HCC): ICD-10-CM

## 2025-03-10 DIAGNOSIS — D37.8 NEOPLASM OF UNCERTAIN BEHAVIOR OF OTHER SPECIFIED DIGESTIVE ORGANS: ICD-10-CM

## 2025-03-10 DIAGNOSIS — D37.8 NEOPLASM OF UNCERTAIN BEHAVIOR OF PANCREAS: ICD-10-CM

## 2025-03-10 DIAGNOSIS — J98.4 CAVITARY LESION OF LUNG: ICD-10-CM

## 2025-03-10 DIAGNOSIS — R05.3 CHRONIC COUGH: Primary | ICD-10-CM

## 2025-03-10 DIAGNOSIS — K21.9 LARYNGOPHARYNGEAL REFLUX (LPR): ICD-10-CM

## 2025-03-10 DIAGNOSIS — K86.2 CYST OF PANCREAS (HHS-HCC): Primary | ICD-10-CM

## 2025-03-10 LAB
HOLD SPECIMEN: NORMAL
SCAN RESULT: NORMAL

## 2025-03-10 PROCEDURE — 99214 OFFICE O/P EST MOD 30 MIN: CPT | Performed by: GENERAL PRACTICE

## 2025-03-10 PROCEDURE — 1157F ADVNC CARE PLAN IN RCRD: CPT | Performed by: GENERAL PRACTICE

## 2025-03-10 PROCEDURE — 1159F MED LIST DOCD IN RCRD: CPT | Performed by: GENERAL PRACTICE

## 2025-03-10 PROCEDURE — G8433 SCR FOR DEP NOT CPT DOC RSN: HCPCS | Performed by: GENERAL PRACTICE

## 2025-03-10 PROCEDURE — 86301 IMMUNOASSAY TUMOR CA 19-9: CPT

## 2025-03-10 NOTE — PROGRESS NOTES
Otolaryngology - Head and Neck Surgery Outpatient New Patient Visit Note        Assessment/Plan:   Problem List Items Addressed This Visit             ICD-10-CM       Pulmonary and Pneumonias    Cavitary lesion of lung J98.4     Other Visit Diagnoses         Codes    Chronic cough    -  Primary R05.3    Laryngopharyngeal reflux (LPR)     K21.9            Some recent increase in cough with yellow/green sputum.  Pending sputum cultures to guide pseudomonas lung management.   Some increase in clear rhinorrhea without obvious discolored debris.    Discussed ongoing controls fr rhiitis with budesonide, astelin, PRN atrovent.   Discussed ongoing controls for reflux with gaviscon and PRN famotidine.  Discussed observation for results of sputum culture as increase in cough may be attributed to pulmonary process.           Follow up:  -plan for follow up in clinic as needed    All of the above findings, impressions, treatment planning and follow up plans were discussed with the patient who indicated understanding.  the patient was instructed to contact or return to clinic sooner if symptoms/signs persist or worsen despite the above management.      Enrico Liu MD  Otolaryngology - Head and Neck Surgery            History Of Present Illness  Abad Yan is a 74 y.o. female presenting for follow up of cough and prior findings of LPR on NP scope.    Reports improvement in symptoms with use of gaviscon and PRN H2 blocker    Waxing/waning cough, with recent increase in yellow/green productive cough.   No discolored rhinorrhea or sense of sinusitis  Continues on astelin, atrovent, budesonide in saline irrigation.    Sputum cultures planned for eval of lung source.     Recall    Abad Yan is a 74 y.o. female presenting for bothersome chronic cough.   Notes months/year of intermittent cough causing coughing fits.    Notes no inciting illness/trauma, but pt with known R upper lobe cavitary lung      Noted to  have some sphenoid sinus inflammation on recent CT, question about contributing to chronicc ough.     Notes a history of mild congesion and some PND. But no sinus pain/pressure and rare acute sinusitis.  No significant allergic rhinitis.     Reports a history of GERD, poorly controlled, with intermittent hoang reflux into throat.      Recall     Abad Yan is a 74 y.o. female presenting for follow up of hearing loss as well as discussion left neck fullness/mass.      Reports no chagne in hearing.  Recent audiogram 10/4/24 shows no changes from last year.     Pt reports left high neck (tail of parotid region) mass/fullness which has been present for >2-3.  No inciting illness/trauma recalled  Reports mild increase in size over time,  Mild waxing/waning with URI symptoms.    Minimally TTP.        No dysphagia, odynophagia, dysphonia, dyspnea.  No oral lesions or masses.  No sore throat or globus sensation.  No otalgia, aural fullness or hearing change.  No new nasal obstruction or epistaxis.  No other neck masses or lumps.  No unintentional weight loss, night sweats, F/C, N/V or systemic symptoms of toxicity or malignancy.      Recall     72yoF presents for evaluation of left sided hearing loss.      The patient reports slow progression of the hearing loss over time.  The patient denies tinnitus.  The patient denies a history of significant noise exposure due to occupational exposures, industrial noise, etc.   The patient denies sudden changes in hearing.  The patient denies otalgia, otorrhea, vertigo, or facial weakness.   The patient denies a history of otologic surgery or trauma.  The patient denies a history of blast injury, TBI or concussion associated with hearing loss.  The patient denies a family history of significant hearing loss.  The patient reports a history of AOM as a child, but no recent significant history of ear infection.               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, Cavitary lesion of lung (08/05/2024), Chest pain (02/12/2024), Chronic headache, Chronic rhinitis, Contact with and (suspected) exposure to pediculosis, acariasis and other infestations (07/24/2014), COPD (chronic obstructive pulmonary disease) (Multi), Cramps of lower extremity (02/12/2024), Depression, Disorder of kidney and ureter, unspecified (01/19/2016), Encounter for screening mammogram for malignant neoplasm of breast (12/09/2015), Fall due to slipping on ice or snow (02/12/2024), GERD (gastroesophageal reflux disease), Headache, unspecified (06/28/2017), Hemoptysis (10/25/2019), History of cardiovascular disorder (02/12/2024), Hyperlipidemia, Hypotension determined by examination (03/14/2024), Hypothyroidism, Immunodeficiency (Multi), Leukopenia, Menopausal and female climacteric states (07/21/2015), Migraine, Nausea (02/12/2024), Nondisplaced fracture of neck of right radius, initial encounter for closed fracture (05/08/2019), Osteopenia, Other conditions influencing health status, Other conditions influencing health status, Pain in wrist (02/12/2024), Palpitations (09/13/2023), Pancreatic cyst ([facility]), Personal history of diseases of the blood and blood-forming organs and certain disorders involving the immune mechanism (05/27/2022), Personal history of other diseases of the circulatory system (12/07/2018), Personal history of other diseases of the circulatory system (11/05/2018), Personal history of other diseases of the circulatory system (12/07/2018), Personal history of other diseases of the circulatory system, Personal history of other diseases of the female genital tract (07/21/2015), Personal history of other diseases of the female genital tract, Personal history  of other diseases of the respiratory system (08/05/2013), Personal history of other diseases of the respiratory system (01/27/2016), Personal history of other diseases of the respiratory system (09/12/2013), Personal history of other diseases of the respiratory system (12/16/2013), Personal history of other diseases of the respiratory system (08/05/2013), Personal history of other diseases of urinary system (03/14/2022), Personal history of other specified conditions (03/10/2016), Personal history of other specified conditions (01/06/2020), Personal history of other specified conditions (11/21/2018), Pneumonia, unspecified organism (11/06/2015), Pseudomonas aeruginosa infection (02/12/2024), Rash (02/12/2024), Rash and other nonspecific skin eruption (12/08/2014), Restless legs, Suspected sleep apnea, Trochanteric bursitis, unspecified hip (04/18/2014), Urinary tract infection, site not specified (02/10/2022), and Uterine prolapse.    Surgical History  She has a past surgical history that includes Hernia repair (10/08/2012); Dilation and curettage of uterus (10/08/2012); and Bronchoscopy.     Social History  She reports that she quit smoking about 48 years ago. Her smoking use included cigarettes. She started smoking about 58 years ago. She has a 15 pack-year smoking history. She has never used smokeless tobacco. She reports that she does not currently use alcohol. She reports that she does not use drugs.    Family History  Family History   Problem Relation Name Age of Onset    Cancer Brother      Other (cardiovascular disorder) Brother      Allergies Other      Cancer Other      Hearing loss Other      Hypertension Other      Other (pulmonary disease) Other      Breast cancer Father's Sister  90        Allergies  Doxepin, Fluoxetine, Nortriptyline, and Sertraline    Review of Systems  ROS: Pertinent positives as noted in HPI.    - CONSTITUTIONAL: Does not report weight loss, fever or chills.    - HEENT:   Ear: Does  not report tinnitus, vertigo, hearing loss, otalgia, otorrhea  Nose: Does not report  ,  , epistaxis, decreased smell  Throat: Does not report pain, dysphagia, odynophagia  Larynx: Does not report hoarseness,  difficulty breathing, pain with speaking (odynophonia)  Neck: Does not report new masses, pain, swelling  Face: Does not report sinus pain, pressure, swelling, numbness, weakness     - RESPIRATORY: Does not report SOB or  .    - CV: Does not report palpitations or chest pain.     - GI: Does not report abdominal pain, nausea, vomiting or diarrhea.    - : Does not report dysuria or urinary frequency.    - MSK: Does not report myalgia or joint pain.    - SKIN: Does not report rash or pruritus.    - NEUROLOGICAL: Does not report headache or syncope.    - PSYCHIATRIC: Does not report recent changes in mood. Does not report anxiety or depression.         Physical Exam:     GENERAL:   Alert & Oriented to person, place and time; Normal affect and appearance. Well developed and well nourished. Conversant & cooperative with examination.     HEAD:   Normocephalic, atraumatic. No sinus tenderness to palpation. Normal parotid bilaterally. Normal facial strength.     NEUROLOGIC:   Cranial nerves II-XII grossly intact, gait WNL. Normal mood and affect.    EYES:   Extraocular movements intact. Pupils equal, round, reactive to light and accommodation. No nystagmus, no ptosis. no scleral injection.    EAR:   Normal auricle. No discomfort or TTP with manipulation.   Handheld otoscopic exam showed normal external auditory canals bilaterally. No purulence or EAC inflammation. Minimal cerumen.   Right tympanic membrane clear and mobile without evidence of perforation, retraction or middle ear effusion.   Left tympanic membrane clear and mobile without evidence of perforation, retraction or middle ear effusion.     NOSE:   No external deformity. No external nasal lesions, lacerations, or scars. Nasal tip symmetrical with normal  nasal valves.   Nasal cavity with essentially midline septum, normal mucosa and turbinates. No lesions, masses, purulence or polyps.     OC/OP:   Mucous membranes moist, no masses, lesions or exudates.   Normal tongue, floor of mouth, teeth, gums, lips. Normal posterior pharyngeal wall.    Normal tonsils without erythema, exudate or obvious calculi     NECK:   No neck masses or thyroid enlargement. Trachea midline. No tenderness to palpation    LYMPHATIC:   No cervical lymphadenopathy.     RESPIRATORY:   Symmetric chest elevation & no retractions. No significant hoarseness. No increased work of breathing.    CV:   No clubbing or cyanosis. No obvious edema    Skin:   No facial rashes, vesicles or lesions.     Extremities:   No gross abnormalities      Clinic Procedure        Information review:  External sources (notes, imaging, lab results) listed below personally reviewed to aid in medical decision making process.  -  -  -

## 2025-03-11 ENCOUNTER — TELEPHONE (OUTPATIENT)
Dept: RADIOLOGY | Facility: HOSPITAL | Age: 75
End: 2025-03-11
Payer: MEDICARE

## 2025-03-11 LAB — CANCER AG19-9 SERPL-ACNC: 60.03 U/ML

## 2025-03-11 NOTE — PROGRESS NOTES
"Reason for visit:  FUV / Review Imaging    HPI:  Now 73 yo woman.  Summary from visit March 2024:    \"This patient has a dominant pancreatic cyst in the body/tail of her gland that we have been following for a couple of years.  She also has an elevated 19-9 noted above.  She has been reviewed at our weekly pancreas conference numerous times and we have never felt the need for endoscopic ultrasound evaluation.     There has been very slight growth between her last MRI and this MRI, perhaps a few millimeters.  There remains no obvious solid component or pancreatic ductal dilatation.  Her 19-9 is mildly elevated compared to the last 1 but probably within the error of repeat testing.     I will review this patient at our upcoming weekly pancreas imaging conference.  I suspect that we will simply continue recommending MRI surveillance however I will raise the issue of whether we should think about an endoscopic ultrasound evaluation as she has never had 1 and we have this persistent mildly elevated 19-9.     This patient will be in the doctor's office next Wednesday morning and we will call her next Wednesday afternoon after our conference review.     This note has been dictated with voice recognition software and has not been reviewed for grammar or content errors.     Update 3/13:  we reviewed at conference today.  At most a couple mm change in size over a couple a couple years.  No PD dil.  No solid component.  Group OK with ongoing annual surveillance.  EUS could be done as a baseline given 19-9 but unlikely to find anything/ based on good quality MRI.  Left  VM for pt to call back     Update 3/14:  able to speak to pt today.  Reviewed the above...annual MRI vs EUS.  Again, we are comfortable with the former.  Pt is as well and will call in a year for MRI and office visit.\"    -----    MRI March 2025:  PANCREAS:  There is fatty atrophic changes of the pancreatic parenchyma.  Multiple subcentimeter " cystic lesions are noted which are similar in  size to 03/06/2024. The largest, within the pancreatic tail, measures  1.8 x 1.7 cm, similar to findings from 03/06/2024 when measured in a  similar fashion. The main pancreatic duct is distended without  evident dilation. Cystic lesions appear to connection with the main  pancreatic duct suggestive of branch duct IPMN. Additional lesions  are as annotated on series 8, similar in appearance to prior  examination.  IMPRESSION: (Read by Ashwini)  1. Stable appearance of multiple cystic pancreatic lesions favoring  branch duct IPMNs or less likely side branch dilations. The largest  measures 1.8 cm within the pancreatic tail.    19-9 currently 60 (previously 122)    No new health issues.  Dealing with recurrent pneumonias.    PE: Appears well and in good spirits.  Wearing a mask.    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  We will simply continue with annual MRI surveillance.  She will call the office in a years time and my team will set up the MRI and office visit.    This note has been dictated with voice recognition software and has not been reviewed for grammar or content errors.

## 2025-03-11 NOTE — TELEPHONE ENCOUNTER
Abad is listed on the incidental findings list based on imaging completed 3/7/2025 in which radiology noted a pancreas cyst. Abad follows with Dr. Lewis Mayers, who is the provider that ordered the MRCP imaging. Sees Dr. Mayers 3/12/2025.

## 2025-03-12 ENCOUNTER — OFFICE VISIT (OUTPATIENT)
Dept: SURGERY | Facility: CLINIC | Age: 75
End: 2025-03-12
Payer: MEDICARE

## 2025-03-12 VITALS
SYSTOLIC BLOOD PRESSURE: 110 MMHG | HEIGHT: 67 IN | OXYGEN SATURATION: 96 % | WEIGHT: 125.6 LBS | BODY MASS INDEX: 19.71 KG/M2 | HEART RATE: 76 BPM | DIASTOLIC BLOOD PRESSURE: 70 MMHG | TEMPERATURE: 97.4 F

## 2025-03-12 DIAGNOSIS — K86.2 PANCREATIC CYST (HHS-HCC): Primary | ICD-10-CM

## 2025-03-12 PROCEDURE — 99212 OFFICE O/P EST SF 10 MIN: CPT | Performed by: SURGERY

## 2025-03-12 PROCEDURE — 1157F ADVNC CARE PLAN IN RCRD: CPT | Performed by: SURGERY

## 2025-03-12 PROCEDURE — 1159F MED LIST DOCD IN RCRD: CPT | Performed by: SURGERY

## 2025-03-12 PROCEDURE — 1036F TOBACCO NON-USER: CPT | Performed by: SURGERY

## 2025-03-13 ENCOUNTER — TELEPHONE (OUTPATIENT)
Dept: PRIMARY CARE | Facility: CLINIC | Age: 75
End: 2025-03-13

## 2025-03-13 LAB
ACID FAST STN SPEC: NORMAL
MYCOBACTERIUM SPEC CULT: NORMAL
SCAN RESULT: NORMAL

## 2025-03-13 NOTE — TELEPHONE ENCOUNTER
"Abad met with Dr. Lewis Mayers 3/12/2025. Note states, \"Reviewed the above...annual MRI vs EUS.  Again, we are comfortable with the former.  Pt is as well and will call in a year for MRI and office visit.\" Due for repeat MRCP ~3/7/2026  "

## 2025-03-14 ENCOUNTER — APPOINTMENT (OUTPATIENT)
Dept: SURGERY | Facility: CLINIC | Age: 75
End: 2025-03-14
Payer: MEDICARE

## 2025-03-14 LAB
ACID FAST STN SPEC: NORMAL
MYCOBACTERIUM SPEC CULT: NORMAL
SCAN RESULT: NORMAL

## 2025-03-17 ENCOUNTER — APPOINTMENT (OUTPATIENT)
Dept: OBSTETRICS AND GYNECOLOGY | Facility: CLINIC | Age: 75
End: 2025-03-17
Payer: MEDICARE

## 2025-03-18 ENCOUNTER — APPOINTMENT (OUTPATIENT)
Dept: SURGERY | Facility: CLINIC | Age: 75
End: 2025-03-18
Payer: MEDICARE

## 2025-03-19 DIAGNOSIS — F33.9 DEPRESSION, RECURRENT (CMS-HCC): ICD-10-CM

## 2025-03-19 RX ORDER — ESCITALOPRAM OXALATE 5 MG/1
TABLET ORAL
Qty: 90 TABLET | Refills: 1 | Status: SHIPPED | OUTPATIENT
Start: 2025-03-19

## 2025-03-23 NOTE — PROGRESS NOTES
Urogynecology  Provider:  Chelsey Arroyo MD  [phone]              ASSESSMENT AND PLAN:     Problem List Items Addressed This Visit    None          I spent a total of {eConsult Time:38141} in face to face and non face to face time.        Chelsey Arroyo MD        HISTORY OF PRESENT ILLNESS:     Last visit 3/2024  Diagnoses:   #1 Josue's     Plan:   Josue's  - Continue use of TV estrogen cream twice a week, refilled and sent to pharmacy   - Continue use of OTC cranberry supplements  - Standing urine culture order placed, pt advised      Follow-up in one year with Dr. 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     Cavitary lesion of lung 08/05/2024    Chest pain 02/12/2024    Chronic headache     Chronic rhinitis     Chronic rhinitis    Contact with and (suspected) exposure to pediculosis, acariasis and other infestations 07/24/2014    Scabies exposure    COPD (chronic obstructive pulmonary disease) (Multi)     Cramps of lower extremity 02/12/2024    Depression     Disorder of kidney and ureter, unspecified 01/19/2016    Mild renal insufficiency    Encounter for screening mammogram for malignant neoplasm of breast 12/09/2015    Other screening mammogram    Fall due to slipping on ice or snow 02/12/2024    GERD (gastroesophageal reflux disease)     Headache, unspecified 06/28/2017    Worsening headaches    Hemoptysis 10/25/2019    Cough with hemoptysis    History of cardiovascular disorder 02/12/2024    Hyperlipidemia     Hypotension determined by examination 03/14/2024    Hypothyroidism     Immunodeficiency (Multi)      Leukopenia     Menopausal and female climacteric states 07/21/2015    Menopausal symptoms    Migraine     Nausea 02/12/2024    Nondisplaced fracture of neck of right radius, initial encounter for closed fracture 05/08/2019    Osteopenia     Other conditions influencing health status     Acute Sphenoidal Sinusitis    Other conditions influencing health status     Pulmonary Disease    Pain in wrist 02/12/2024    Palpitations 09/13/2023    Pancreatic cyst ([facility])     Personal history of diseases of the blood and blood-forming organs and certain disorders involving the immune mechanism 05/27/2022    History of immunodeficiency    Personal history of other diseases of the circulatory system 12/07/2018    History of diastolic dysfunction    Personal history of other diseases of the circulatory system 11/05/2018    History of abnormal electrocardiography    Personal history of other diseases of the circulatory system 12/07/2018    History of cardiomyopathy    Personal history of other diseases of the circulatory system     History of peripheral vascular disease    Personal history of other diseases of the female genital tract 07/21/2015    History of postmenopausal bleeding    Personal history of other diseases of the female genital tract     History of pelvic inflammatory disease    Personal history of other diseases of the respiratory system 08/05/2013    Personal history of asthma    Personal history of other diseases of the respiratory system 01/27/2016    History of upper respiratory infection    Personal history of other diseases of the respiratory system 09/12/2013    History of upper respiratory infection    Personal history of other diseases of the respiratory system 12/16/2013    History of upper respiratory infection    Personal history of other diseases of the respiratory system 08/05/2013    History of allergic rhinitis    Personal history of other diseases of urinary system 03/14/2022    History of  pyelonephritis    Personal history of other specified conditions 03/10/2016    History of abnormal mammogram    Personal history of other specified conditions 01/06/2020    History of chest pain    Personal history of other specified conditions 11/21/2018    History of hemoptysis    Pneumonia, unspecified organism 11/06/2015    CAP (community acquired pneumonia)    Pseudomonas aeruginosa infection 02/12/2024    Rash 02/12/2024    Rash and other nonspecific skin eruption 12/08/2014    Rash of neck    Restless legs     Suspected sleep apnea     Trochanteric bursitis, unspecified hip 04/18/2014    Trochanteric bursitis    Urinary tract infection, site not specified 02/10/2022    Acute UTI    Uterine prolapse           Past Surgical History:       Past Surgical History:   Procedure Laterality Date    BRONCHOSCOPY      DILATION AND CURETTAGE OF UTERUS  10/08/2012    Dilation And Curettage    HERNIA REPAIR  10/08/2012    Hernia Repair         Medications:       Prior to Admission medications    Medication Sig Start Date End Date Taking? Authorizing Provider   aflibercept (Eylea) 2 mg/0.05 mL intra-ocular injection 0.05 mL (2 mg) by intravitreal route 1 time.    Historical Provider, MD   albuterol 2.5 mg /3 mL (0.083 %) nebulizer solution Take 3 mL (2.5 mg) by nebulization 2 times a day. 1/31/25   Crissy Gil MD   aluminum hydrox-magnesium carb (Gaviscon Extra Strength) 160-105 mg tablet,chewable Chew 1 tablet 3 times a day. 12/12/24   Enrico Liu MD   azelastine (Astelin) 137 mcg (0.1 %) nasal spray Administer 2 sprays into each nostril 2 times a day as needed. 4/13/23   Historical Provider, MD   calcium carbonate (Tums) 200 mg calcium chewable tablet Chew 1 tablet (500 mg) 3 times a day.    Historical Provider, MD   CALCIUM CARBONATE-VITAMIN D3 ORAL Take 1 tablet by mouth once daily.    Historical Provider, MD   coenzyme Q-10 100 mg capsule Take 1 capsule (100 mg) by mouth.    Historical Provider, MD   cranberry 500  mg capsule Take 1 capsule by mouth once daily.    Historical Provider, MD   escitalopram (Lexapro) 5 mg tablet TAKE 1 TABLET EVERY EVENING 3/19/25   Eva De Paz MD   estradiol (Estrace) 0.01 % (0.1 mg/gram) vaginal cream Insert 0.25 Applicatorfuls (1 g) into the vagina 2 times a week. 1/20/25   Chelsey Arroyo MD   famotidine (Pepcid) 20 mg tablet TAKE 1 TABLET IN THE MORNING AND 1 TABLET IN THE AFTERNOON ON AN EMPTY STOMACH 1/20/25   Eva De Paz MD   ferrous sulfate  mg ER tablet Take 1 tablet by mouth once daily. 4/14/15   Historical Provider, MD   folic acid (Folvite) 1 mg tablet Take 1 tablet (1 mg) by mouth once daily.    Historical Provider, MD   immune globulin, human, (Privigen) 10 % solution infusion orally every 3 weeks    Historical Provider, MD   ipratropium (Atrovent) 42 mcg (0.06 %) nasal spray Administer 2 sprays into each nostril 3 times a day. 9/30/15   Historical Provider, MD   levothyroxine (Synthroid, Levoxyl) 75 mcg tablet Take 1 tablet (75 mcg) by mouth once daily in the morning. Take before meals. Take 1 tablet daily 1/9/25   Eva De Paz MD   magnesium oxide (Mag-Ox) 250 mg magnesium tablet Take 1 tablet (250 mg) by mouth once daily.    Historical Provider, MD   multivitamin (Multiple Vitamins) tablet Take 1 tablet by mouth once daily. 4/8/20   Historical Provider, MD   potassium chloride ER (Micro-K) 10 mEq ER capsule TAKE 1 CAPSULE DAILY WITH FOOD 1/9/25   Eva De Paz MD   psyllium (Metamucil) 0.4 gram capsule Take 1 capsule by mouth 4 times a day as needed. 3/17/23   Historical Provider, MD   rivaroxaban (Xarelto) 10 mg tablet Take 1 tablet (10 mg) by mouth once daily. 1/19/25 5/19/25  Shawna Jamil MD   rOPINIRole (Requip) 4 mg tablet Take 1 tablet (4 mg) by mouth once daily at bedtime. 1/9/25   Eva De Paz MD   rosuvastatin (Crestor) 5 mg tablet Take one tablet qday 1/9/25   Eva De Paz MD   sodium chloride 3% nebulizer  solution Take 3 mL by nebulization 3 times a day. 10/14/24   Crissy Gil MD   tretinoin (Retin-A) 0.025 % cream Apply topically once daily at bedtime. A pea-sized amount to the whole face, start every 2-3 nights and gradually increase to nightly 24  Deborah Weeks MD   escitalopram (Lexapro) 5 mg tablet Take one tablet qpm 1/9/25 3/19/25  Eva De Paz MD        ROS  Review of Systems     Blood, Urine   Date Value Ref Range Status   2024 NEGATIVE NEGATIVE Final     Nitrite, Urine   Date Value Ref Range Status   2024 NEGATIVE NEGATIVE Final     Urobilinogen, Urine   Date Value Ref Range Status   2024 Normal Normal mg/dL Final         PHYSICAL EXAM:      LMP  (LMP Unknown)      No LMP recorded (lmp unknown). Patient is postmenopausal.      Declines chaperone for physical exam.      Well developed, well nourished, in no apparent distress.   Neurologic/Psychiatric:  Awake, Alert and Oriented times 3.  Affect normal.     GENITAL/URINARY:       External Genitalia:  The patient has normal appearing external genitalia, normal skenes and bartholins glands, and a normal hair distribution.  Her vulva is without lesions, erythema or discharge.  It is non-tender with appropriate sensation.     Urethral Meatus:  Size normal, Location normal, Lesions absent, Prolapse absent,      Urethra:  Fullness absent, Masses absent,      Bladder:  Fullness absent, Masses absent, Tenderness absent,      Vagina:  General appearance normal, Estrogen effect normal, Discharge absent, Lesions absent, ***     Cervix: Normal, no discharge.   Uterus:  {UTERUS, EXAM:68697}  Adnexa: {Adnexa:40435}     Anus/Perineum:  Lesions absent and Masses absent {Exam; anus:72869}  {Exam; anus and perineum:04242}    Stress urinary incontinence *** demonstrable.         POP-Q:  Stage: {NUMBERS 0-4:50961}  Position: {SITTING STANDIN}    Aa: ***       Ba: *** C: ***   Gh: *** Pb: *** TVL: ***         Ap: *** Bp: *** D:  ***               Data and DIAGNOSTIC STUDIES REVIEWED   MRCP pancreas w and wo IV contrast    Result Date: 3/12/2025  Interpreted By:  Aaron Maradiaga,  and James Valenzuela STUDY: MRCP PANCREAS W AND WO IV CONTRAST;  3/7/2025 11:15 am   INDICATION: Signs/Symptoms:surveillance of pancreatic cyst , IPMN, compare to prior image.   ,K86.2 Cyst of pancreas ([facility]-HCC)   COMPARISON: Chest CT 01/08/2025, MRCP 03/06/2024   ACCESSION NUMBER(S): [id number]   ORDERING CLINICIAN: SHILPA JAMES   TECHNIQUE: MRI PANCREAS; Multiplanar magnetic resonance images of the abdomen were obtained including the following sequences; T2-weighted SSFSE with and without fat saturation, T1-weighted GRE in/opposed phase, DWI, fat saturated 3D-T1w GRE pre and dynamically post contrast. Radial thick slab T2w RARE MRCP and coronally reconstructed navigator gated high resolution 3-D T2w RESTORE MRCP with MIP reconstruction were also performed for MRCP.  12 ML of Dotarem was administered intravenously without immediate complication.   FINDINGS: LIVER: The liver measures 15.9 cm in the craniocaudal dimension. The liver is isointense on in and opposed phase imaging.   BILE DUCTS: There is stable prominence of the common bile duct and proximal intrahepatic bile ducts, likely physiologic. The common bile duct measures up to 0.7 cm, within normal limits for age.   GALLBLADDER: The gallbladder is non distended.   PANCREAS: There is fatty atrophic changes of the pancreatic parenchyma. Multiple subcentimeter cystic lesions are noted which are similar in size to 03/06/2024. The largest, within the pancreatic tail, measures 1.8 x 1.7 cm, similar to findings from 03/06/2024 when measured in a similar fashion. The main pancreatic duct is distended without evident dilation. Cystic lesions appear to connection with the main pancreatic duct suggestive of branch duct IPMN. Additional lesions are as annotated on series 8, similar in appearance to prior  examination.   SPLEEN: Spleen is normal in size and homogeneous in signal intensity. There is a 1.3 cm T2 hyperintense lesion within the superior aspect of the kidney which does not demonstrate postcontrast enhancement favoring a benign cystic lesion such as a benign pseudocyst. An adjacent subcentimeter lesion demonstrates similar signal characteristics, also favored to represent a benign cystic lesion.   ADRENAL GLANDS: Within normal limits.   KIDNEYS: Within normal limits. Stable prominent bilateral renal pelvises.   LYMPH NODES: No lymphadenopathy.   ABDOMINAL VESSELS: Aorta and the major abdominal arterial vessels demonstrate no gross abnormality.  Superior mesenteric vein, splenic vein, and main, right and left portal vein are patent. Hepatic veins are patent.  No significant collaterals or esophageal varices are present.   BOWEL: Stomach and duodenum are within normal limits. Visualized small and large bowel loops are normal in caliber.   PERITONEUM/RETROPERITONEUM: No ascites.   BONES AND LOWER THORAX: S shaped curvature of the visualized thoracolumbar spine with perineural cysts at the level of S1-S2 neural foramina. Multilevel spondylosis of the visualized thoracolumbar spine. Small right pleural effusion. Limited evaluation of previously identified diffuse asymmetric bronchiectasis and tree-in-bud nodularity suggestive of an underlying infectious/inflammatory process.       1. Stable appearance of multiple cystic pancreatic lesions favoring branch duct IPMNs or less likely side branch dilations. The largest measures 1.8 cm within the pancreatic tail. 2. Trace right pleural effusion. 3. Additional chronic findings as above.   I personally reviewed the images/study and I agree with the resident findings as stated by Bianca Ramirez MD. This study was interpreted at [facility], [place], [place].   MACRO: None   Signed by: Aaron Maradiaga 3/12/2025 5:10 AM Dictation  workstation:   [id number]     Lab Results   Component Value Date    URINECULTURE NO SIGNIFICANT GROWTH. 07/20/2023      Lab Results   Component Value Date    GLUCOSE 113 (H) 08/26/2024    CALCIUM 9.4 08/26/2024     08/26/2024    K 4.3 08/26/2024    CO2 27 08/26/2024     08/26/2024    BUN 19 09/06/2024    CREATININE 0.89 09/06/2024     Lab Results   Component Value Date    WBC 8.5 09/06/2024    HGB 11.9 (L) 09/06/2024    HCT 39.0 09/06/2024    MCV 96 09/06/2024     09/06/2024          Chelsey Arroyo MD

## 2025-03-24 ENCOUNTER — APPOINTMENT (OUTPATIENT)
Dept: OBSTETRICS AND GYNECOLOGY | Facility: CLINIC | Age: 75
End: 2025-03-24
Payer: MEDICARE

## 2025-03-24 DIAGNOSIS — N39.0 RECURRENT UTI (URINARY TRACT INFECTION): Primary | ICD-10-CM

## 2025-03-25 ENCOUNTER — OFFICE VISIT (OUTPATIENT)
Dept: PULMONOLOGY | Facility: HOSPITAL | Age: 75
End: 2025-03-25
Payer: MEDICARE

## 2025-03-25 ENCOUNTER — HOSPITAL ENCOUNTER (OUTPATIENT)
Dept: RESPIRATORY THERAPY | Facility: HOSPITAL | Age: 75
Discharge: HOME | End: 2025-03-25
Payer: MEDICARE

## 2025-03-25 VITALS
WEIGHT: 121 LBS | SYSTOLIC BLOOD PRESSURE: 112 MMHG | BODY MASS INDEX: 19.44 KG/M2 | HEIGHT: 66 IN | TEMPERATURE: 97.6 F | OXYGEN SATURATION: 98 % | HEART RATE: 78 BPM | DIASTOLIC BLOOD PRESSURE: 67 MMHG

## 2025-03-25 DIAGNOSIS — J47.1 BRONCHIECTASIS WITH ACUTE EXACERBATION (MULTI): Primary | ICD-10-CM

## 2025-03-25 DIAGNOSIS — J47.9 BRONCHIECTASIS WITHOUT ACUTE EXACERBATION (MULTI): ICD-10-CM

## 2025-03-25 DIAGNOSIS — J47.1 BRONCHIECTASIS WITH ACUTE EXACERBATION (MULTI): ICD-10-CM

## 2025-03-25 DIAGNOSIS — A49.8 PSEUDOMONAS AERUGINOSA INFECTION: ICD-10-CM

## 2025-03-25 PROCEDURE — 99213 OFFICE O/P EST LOW 20 MIN: CPT | Performed by: INTERNAL MEDICINE

## 2025-03-25 PROCEDURE — 99213 OFFICE O/P EST LOW 20 MIN: CPT | Mod: 25 | Performed by: INTERNAL MEDICINE

## 2025-03-25 PROCEDURE — 94618 PULMONARY STRESS TESTING: CPT | Performed by: STUDENT IN AN ORGANIZED HEALTH CARE EDUCATION/TRAINING PROGRAM

## 2025-03-25 PROCEDURE — 1126F AMNT PAIN NOTED NONE PRSNT: CPT | Performed by: INTERNAL MEDICINE

## 2025-03-25 PROCEDURE — 94010 BREATHING CAPACITY TEST: CPT | Performed by: STUDENT IN AN ORGANIZED HEALTH CARE EDUCATION/TRAINING PROGRAM

## 2025-03-25 PROCEDURE — 1036F TOBACCO NON-USER: CPT | Performed by: INTERNAL MEDICINE

## 2025-03-25 PROCEDURE — 94010 BREATHING CAPACITY TEST: CPT

## 2025-03-25 PROCEDURE — 1159F MED LIST DOCD IN RCRD: CPT | Performed by: INTERNAL MEDICINE

## 2025-03-25 PROCEDURE — 1160F RVW MEDS BY RX/DR IN RCRD: CPT | Performed by: INTERNAL MEDICINE

## 2025-03-25 PROCEDURE — 1157F ADVNC CARE PLAN IN RCRD: CPT | Performed by: INTERNAL MEDICINE

## 2025-03-25 RX ORDER — CIPROFLOXACIN 750 MG/1
1 TABLET, FILM COATED ORAL
COMMUNITY
Start: 2025-03-22

## 2025-03-25 ASSESSMENT — PAIN SCALES - GENERAL: PAINLEVEL_OUTOF10: 0-NO PAIN

## 2025-03-25 NOTE — PROGRESS NOTES
"Subjective   Patient ID: Abad Yan is a 74 y.o. female who presents for Follow-up for ongoing pseudomonal infection in the setting of bronchiectasis    HPI  Last visit in this clinic was 01/07/25.    Abad Yan is a 74 y.o. female patient with a history of MAC, CVID on every 3 week IVIG, chronic bronchiectasis, and recurrent sinopulmonary infections who was diagnosed with lower respiratory tract infection related pseudomonas causing worsening changes in the RUL who presents today for follow-up.   Since last visit she has completed a 4-week course of meropenem and has resumed the use of inhaled tobramycin being given on alternating months..  She was feeling very well while on a 3-week trip in [place].  She was really well exercise tolerance was good.  She was more active with marked decrease in cough.  Upon return to [place] within few days cough increased, fatigue recurred, sputum still clear but more than before.  It was believed that recurrence of infection has happened and she is now on ciprofloxacin.  The patient currently has an infection. She has a cough with white production, usually at night and in the morning. Her cough worsens when lying flat. She continues with vest therapy. She did have mild hemoptysis about 2 weeks ago. She is mildly short of breath due to her infection with heavy activity. She will walk for about an hour without feeling short of breath. She does note fatigue. She denies fever, chills, and night sweats.     She has a small appetite and continues to work on weight gain and maintaining her weight.    Formal evaluation by ENT completed.  Currently she does not have any sinus infections.  Reflux disease was noted not to be completely controlled.  Laryngeal pharyngeal reflux was diagnosed    Physical exam  Blood pressure 112/67, pulse 78, temperature 36.4 °C (97.6 °F), height 1.676 m (5' 6\"), weight 54.9 kg (121 lb), SpO2 98%, not currently " breastfeeding.    GENERAL: Normal appearance. No respiratory distress   OROPHARYNX: Moist mucosa, no thrush or lesions - Mallampati I   NECK: No JVD, midline trachea without stridor.   LYMPH NODES: None felt in the cervical, submandibular, or supraclavicular regions.   LUNGS: Equal breath sounds bilaterally. Clear lung fields anteriorly and posteriorly with no wheeze, crackles, or rhonchi.   CARDIAC: Normal S1 and S2; no gallops, rubs or murmurs. Regular rate and rhythm.   EXTREMITIES: No edema.   NEURO: Grossly normal mental status, CNs, reflexes, and motor strength.   SKIN: Skin turgor normal. No rashes or lesions.    PSYCH: Normal affect.       AVAILABLE DATA - this represents my personal interpretation which is in complete agreement with radiology     CT Chest 01/08/25  IMPRESSION:  1.  Dominant cavitary mass in the right upper lobe/right lung apex measuring 3.7 x 3.2 cm, increased in size compared to prior study.  findings are likely due to worsening infection  2. Extensive, right more than left bronchiectasis and bronchial wall thickening in keeping with airway disease. Extensive tree-in-bud nodular opacities in bilateral lungs more on the right side, slightly worse compared to prior study and concerning for endobronchial spread of infection and active inflammatory/infectious process.  3.  No air trapping in the expiratory phase images.  4. Multiple prominent lymph nodes throughout the mediastinum,  unchanged and likely reactive.    Assessment/Plan   Pseudomonas lower respiratory tract infection  This has not been completely irregular dictated, however, with stability of spirometry, adequate exercise tolerance and physical exam it seems it is at a lower grade  Agree with IDs plans to keep on inhaled tobramycin on alternating months  Reevaluation of the CT scan of the chest is still needed and is planned for 6 months from the last scan  Bronchiectasis with ongoing lower respiratory tract infection  This is  believed to be secondary to previously diagnosed common variable immunodeficiency syndrome  It got aggravated with MAC infection  Mild obstructive disease is noted on spirometry but has been stable over the past few years  Airway clearance strategy has been with utilization of vest therapy along with 3% inhalation of saline twice a day  Using a flutter device in the middle of the day is also recommended  History of Mycobacterium AVM intracellulare  Treated in 2017, recurred in 2019.  Treated again  Several sputum samples have been sent without a recent isolation  Even with bronchoscopy MAC did not get isolated  Common variable immunodeficiency   On replacement therapy  Management plans  Keep using 3% saline nebulizer just prior to the vest therapy after an albuterol treatment  Vest therapy 2  times a day upon awakening in the morning and before you go to bed  Flutter device during the day  Increase activity as tolerated  Antibiotics as per ID  chest CT ordered for June  Follow-up in this clinic in after the chest CT in June    Scribe Attestation  By signing my name below, IBrandon, Scrophelia   attest that this documentation has been prepared under the direction and in the presence of Crissy Gil MD.

## 2025-03-25 NOTE — PATIENT INSTRUCTIONS
Thank you for allowing me to participate in your health care today.    The primary respiratory problem that we discussed is bronchiectasis and recent pseudomonas infection  Keep using 3% saline nebulizer just prior to the vest therapy after an albuterol treatment  Vest therapy 2  times a day upon awakening in the morning and before you go to bed  Flutter device during the day  Increase activity as tolerated  Antibiotics as per ID  chest CT ordered for June  Follow-up in this clinic in after the chest CT in June    Please call [phone] ([facility]) to get your tests scheduled.     Please call [phone] to schedule a follow up appointment with me.    Unless deemed urgent or emergent, the result(s) of any tests ordering during this clinic visit will be reviewed during your follow-up visit. Depending on the urgency of the result(s), I may call or send you a [employer] message.

## 2025-03-26 LAB
MGC ASCENT PFT - FEV1 - PRE: 1.84
MGC ASCENT PFT - FEV1 - PRE: 1.84
MGC ASCENT PFT - FEV1 - PREDICTED: 2.2
MGC ASCENT PFT - FEV1 - PREDICTED: 2.2
MGC ASCENT PFT - FVC - PRE: 3.45
MGC ASCENT PFT - FVC - PRE: 3.45
MGC ASCENT PFT - FVC - PREDICTED: 2.9
MGC ASCENT PFT - FVC - PREDICTED: 2.9

## 2025-03-31 ENCOUNTER — APPOINTMENT (OUTPATIENT)
Dept: OBSTETRICS AND GYNECOLOGY | Facility: CLINIC | Age: 75
End: 2025-03-31
Payer: COMMERCIAL

## 2025-04-06 LAB
ACID FAST STN SPEC: NORMAL
MYCOBACTERIUM SPEC CULT: NORMAL

## 2025-04-09 ENCOUNTER — APPOINTMENT (OUTPATIENT)
Dept: PRIMARY CARE | Facility: CLINIC | Age: 75
End: 2025-04-09
Payer: MEDICARE

## 2025-04-09 VITALS
SYSTOLIC BLOOD PRESSURE: 108 MMHG | DIASTOLIC BLOOD PRESSURE: 60 MMHG | HEART RATE: 96 BPM | BODY MASS INDEX: 20.02 KG/M2 | WEIGHT: 124.56 LBS | RESPIRATION RATE: 16 BRPM | OXYGEN SATURATION: 95 % | HEIGHT: 66 IN

## 2025-04-09 DIAGNOSIS — D50.8 OTHER IRON DEFICIENCY ANEMIA: Primary | ICD-10-CM

## 2025-04-09 DIAGNOSIS — R73.9 HYPERGLYCEMIA: ICD-10-CM

## 2025-04-09 DIAGNOSIS — K86.2 PANCREATIC CYST (HHS-HCC): ICD-10-CM

## 2025-04-09 DIAGNOSIS — E78.5 DYSLIPIDEMIA: ICD-10-CM

## 2025-04-09 DIAGNOSIS — F33.9 DEPRESSION, RECURRENT (CMS-HCC): ICD-10-CM

## 2025-04-09 DIAGNOSIS — E03.9 ACQUIRED HYPOTHYROIDISM: ICD-10-CM

## 2025-04-09 PROBLEM — Z79.01 ON APIXABAN THERAPY: Status: RESOLVED | Noted: 2024-08-05 | Resolved: 2025-04-09

## 2025-04-09 PROBLEM — I82.612 ACUTE THROMBOSIS OF LEFT BASILIC VEIN: Status: RESOLVED | Noted: 2024-08-26 | Resolved: 2025-04-09

## 2025-04-09 PROBLEM — I82.A12 DVT OF AXILLARY VEIN, ACUTE LEFT (MULTI): Status: RESOLVED | Noted: 2024-08-03 | Resolved: 2025-04-09

## 2025-04-09 PROBLEM — N95.0 POSTMENOPAUSAL BLEEDING: Status: RESOLVED | Noted: 2023-09-13 | Resolved: 2025-04-09

## 2025-04-09 PROCEDURE — 1160F RVW MEDS BY RX/DR IN RCRD: CPT | Performed by: INTERNAL MEDICINE

## 2025-04-09 PROCEDURE — 1159F MED LIST DOCD IN RCRD: CPT | Performed by: INTERNAL MEDICINE

## 2025-04-09 PROCEDURE — 1158F ADVNC CARE PLAN TLK DOCD: CPT | Performed by: INTERNAL MEDICINE

## 2025-04-09 PROCEDURE — 99214 OFFICE O/P EST MOD 30 MIN: CPT | Performed by: INTERNAL MEDICINE

## 2025-04-09 PROCEDURE — 1157F ADVNC CARE PLAN IN RCRD: CPT | Performed by: INTERNAL MEDICINE

## 2025-04-09 PROCEDURE — 1123F ACP DISCUSS/DSCN MKR DOCD: CPT | Performed by: INTERNAL MEDICINE

## 2025-04-09 PROCEDURE — 1036F TOBACCO NON-USER: CPT | Performed by: INTERNAL MEDICINE

## 2025-04-09 PROCEDURE — G2211 COMPLEX E/M VISIT ADD ON: HCPCS | Performed by: INTERNAL MEDICINE

## 2025-04-09 ASSESSMENT — ENCOUNTER SYMPTOMS
CHILLS: 0
DEPRESSION: 0
DIZZINESS: 0
FEVER: 0
OCCASIONAL FEELINGS OF UNSTEADINESS: 0
LOSS OF SENSATION IN FEET: 0

## 2025-04-09 NOTE — ASSESSMENT & PLAN NOTE
Hmga1c collected again, in fall large amount of carbs intentionally to gain weight, today we discussed protein/carb. Difficult task due to her taste for vegetarian food and small meals.

## 2025-04-09 NOTE — PATIENT INSTRUCTIONS
Reduced iron to 3 x week, stop multivitamin, observe with your Margaret, nausea, bloated improve or not, after 4 weeks. Continue same escitalopram, try to enjoy outdoors when possible. Make sure you take magnesium with dinner. Increase protein with eggs,nuts, peanut butter . Contact office before 2 m for any needs.

## 2025-04-09 NOTE — ASSESSMENT & PLAN NOTE
Due to GI symptoms, trial of reduction of suplement, re eval improvement of symptoms and cbc in 2 months from now

## 2025-04-09 NOTE — PROGRESS NOTES
"Subjective   Patient ID: ELIANE Yan is a 74 y.o. female who presents for AD (Adjustment Disorder), labs,  consults    HPI Since fall she has increased protein and some more carbs. She eats breakfast with  whole cereal , lunch with soup or salad or high protein waffle, dinner small serving of starch and completes with protein bar.  Bp runs low when she gets her infusions, hydration is around 60 oz /day.  We discussed her hmga1c .  Her mood is variable depending of weather, she is looking forward for spring and more time outside  Pt has completed IV antibiotic (meropenem) in mid February , she started cipro on march for chronic pneumonia  She saw surgery team for surveillance of pancreatic cyst, consider stable per MRI, they will follow up in 1 year .  Intense episodes of cough causes regurgitation, nausea, interruption of sleep.  HEENT advised to stop famotidine due to innefectivity and replace with alginate otc.      Review of Systems   Constitutional:  Negative for chills and fever.   Neurological:  Negative for dizziness.   All other systems reviewed and are negative.      Objective   /60   Pulse 96   Resp 16   Ht 1.676 m (5' 6\")   Wt 56.5 kg (124 lb 9 oz)   LMP  (LMP Unknown)   SpO2 95%   BMI 20.10 kg/m²     Physical Exam  Stable weight  Eyes - conjunctivae clear, PERRLA  HEENT - no impacted wax  Neck - no cervical lymphadenopathy, no thyromegaly  Axilla - no palpable lymphadenopathy  Cardiac- regular rate and rhythm, no murmurs, no carotid bruit, no JVP  Lung - clear to auscultation, no rales, no rhonchi, no wheezing  GI - normally active bowel sounds, non tender, non distended, no hepatosplenomegaly, no rebound  MSK - non deformities  Extremities - no edema, wearing compression stockings  Neuro - non focal, oriented x 3  Skin - no bruises, no rashes  Psychiatric - pleasant, well groom, no hallucinations    Assessment/Plan   Problem List Items Addressed This Visit             ICD-10-CM "    Depression, recurrent (CMS-HCC) F33.9     Stable, consider adjustment of meds in the fall         Dyslipidemia E78.5     On goal, no changes         Hypothyroidism E03.9     Pending tsh to adjust         Relevant Orders    TSH with reflex to Free T4 if abnormal    Pancreatic cyst ([facility]) K86.2     Stable continue annual surveillance         Iron deficiency anemia - Primary D50.9     Due to GI symptoms, trial of reduction of suplement, re eval improvement of symptoms and cbc in 2 months from now         Relevant Orders    CBC    Iron and TIBC    Hyperglycemia R73.9     Hmga1c collected again, in fall large amount of carbs intentionally to gain weight, today we discussed protein/carb. Difficult task due to her taste for vegetarian food and small meals.         Relevant Orders    Hemoglobin A1C

## 2025-04-11 LAB
ERYTHROCYTE [DISTWIDTH] IN BLOOD BY AUTOMATED COUNT: 13.5 % (ref 11–15)
EST. AVERAGE GLUCOSE BLD GHB EST-MCNC: 154 MG/DL
EST. AVERAGE GLUCOSE BLD GHB EST-SCNC: 8.5 MMOL/L
HBA1C MFR BLD: 7 % OF TOTAL HGB
HCT VFR BLD AUTO: 37.1 % (ref 35–45)
HGB BLD-MCNC: 11.8 G/DL (ref 11.7–15.5)
IRON SATN MFR SERPL: 18 % (CALC) (ref 16–45)
IRON SERPL-MCNC: 44 MCG/DL (ref 45–160)
MCH RBC QN AUTO: 28.6 PG (ref 27–33)
MCHC RBC AUTO-ENTMCNC: 31.8 G/DL (ref 32–36)
MCV RBC AUTO: 89.8 FL (ref 80–100)
PLATELET # BLD AUTO: 521 THOUSAND/UL (ref 140–400)
PMV BLD REES-ECKER: 9.7 FL (ref 7.5–12.5)
RBC # BLD AUTO: 4.13 MILLION/UL (ref 3.8–5.1)
TIBC SERPL-MCNC: 246 MCG/DL (CALC) (ref 250–450)
TSH SERPL-ACNC: 1.98 MIU/L (ref 0.4–4.5)
WBC # BLD AUTO: 12.1 THOUSAND/UL (ref 3.8–10.8)

## 2025-04-14 ENCOUNTER — TELEPHONE (OUTPATIENT)
Dept: PRIMARY CARE | Facility: CLINIC | Age: 75
End: 2025-04-14
Payer: MEDICARE

## 2025-04-14 ENCOUNTER — TELEPHONE (OUTPATIENT)
Dept: PULMONOLOGY | Facility: HOSPITAL | Age: 75
End: 2025-04-14
Payer: MEDICARE

## 2025-04-14 DIAGNOSIS — J47.9 BRONCHIECTASIS WITHOUT COMPLICATION (MULTI): ICD-10-CM

## 2025-04-14 DIAGNOSIS — E11.9 TYPE 2 DIABETES MELLITUS WITHOUT COMPLICATION, WITHOUT LONG-TERM CURRENT USE OF INSULIN: Primary | ICD-10-CM

## 2025-04-14 RX ORDER — ALBUTEROL SULFATE 0.83 MG/ML
2.5 SOLUTION RESPIRATORY (INHALATION) 3 TIMES DAILY
Qty: 810 ML | Refills: 3 | Status: SHIPPED | OUTPATIENT
Start: 2025-04-14

## 2025-04-14 NOTE — TELEPHONE ENCOUNTER
Pt reached out needing a refill on her albuterol nebulizer solution. Refill order placed and routed to Dr. Gil to sign.

## 2025-05-07 NOTE — PROGRESS NOTES
Urogynecology  Provider:  Chelsey Arroyo MD  [phone]              ASSESSMENT AND PLAN:   74-year-old female being assessed for POP and Josue.    Diagnoses:  #1 Pelvic organ prolapse  #2 Recurrent UTI    Plan:  POP  - Prolapse comes right to the introitus on exam but she is minimally bothered  - She thinks the prolapse improves with Kegels.  - Exam shows prolapse is stable and not bothersome to the patient.  - Continue Kegels.  - No surgical intervention or pessary needed at this time.    2. Recurrent UTI  - Reports no recent UTIs and is taking cranberry supplements BID.  - POCT UA: clear urine with trace white cells.  - Continue cranberry supplements.  - No need for tv estrogen at this time as the patient is asymptomatic.  - Monitor for any recurrence of UTIs.   - If more than one UTI occurs, further evaluation will be needed.    Follow-up with Dr. Arroyo in 1 year or PRN sooner if more than one UTI occurs.    Scribe Attestation  By signing my name below, IJohn Scribe, attest that this documentation has been prepared under the direction and in the presence of Chelsey Arroyo MD on 05/08/2025 at 5:08 PM.    Agree with above. I Dr. Arroyo, personally performed the services described in the documentation which was scribed virtually and confirm it is both complete and accurate.  Chelsey Arroyo MD        Problem List Items Addressed This Visit    None          I spent a total of eConsult Time: 25 minutes in face to face and non face to face time.        Chelsey Arroyo MD        HISTORY OF PRESENT ILLNESS:     Last visit 3/2024  73 y.o. old female being assessed for Josue's     Diagnoses:   #1 Josue's     Plan:   Josue's  - Continue use of TV estrogen cream twice a week, refilled and sent to pharmacy   - Continue use of OTC cranberry supplements  - Standing urine culture order placed, pt advised      Follow-up in one year with Dr. Arroyo        Additional History:  - She requires 3.5  hours of therapy daily for lung issues.  - She's under the care of Dr. Cary for pulmonary management.    Prolapse Symptoms :  - She reports a persistent prolapse that improves with Kegel exercises.  - The prolapse is not bothersome enough to consider surgery or pessary use.  - She is not sexually active and reports no dryness or other vaginal issues.     Urinary Symptoms:   - She hasn't had any recent UTIs.  - She's currently taking OTC cranberry capsules twice daily.  - She has stopped using vaginal estrogen cream and reports doing well without it.    Sexual Activity:  - She's not sexually active.  - Denies dryness or other problems.         Past Medical History:      Medical History[1]       Past Surgical History:       Surgical History[2]      Medications:       Prior to Admission medications    Medication Sig Start Date End Date Taking? Authorizing Provider   aflibercept (Eylea) 2 mg/0.05 mL intra-ocular injection 0.05 mL (2 mg) by intravitreal route 1 time.    Historical Provider, MD   albuterol 2.5 mg /3 mL (0.083 %) nebulizer solution Take 3 mL (2.5 mg) by nebulization 3 times a day. 4/14/25   Crissy Gil MD   azelastine (Astelin) 137 mcg (0.1 %) nasal spray Administer 2 sprays into each nostril 2 times a day as needed. 4/13/23   Historical Provider, MD   CALCIUM ALGINATE, BULK, MISC 5 mL once daily.    Historical Provider, MD   calcium carbonate (Tums) 200 mg calcium chewable tablet Chew 1 tablet (500 mg) 3 times a day.    Historical Provider, MD   CALCIUM CARBONATE-VITAMIN D3 ORAL Take 1 tablet by mouth once daily.    Historical Provider, MD   ciprofloxacin (Cipro) 750 mg tablet Take 1 tablet (750 mg) by mouth every 12 hours. 3/22/25   Historical Provider, MD   coenzyme Q-10 100 mg capsule Take 1 capsule (100 mg) by mouth.    Historical Provider, MD   cranberry 500 mg capsule Take 1 capsule by mouth once daily.    Historical Provider, MD   escitalopram (Lexapro) 5 mg tablet TAKE 1 TABLET EVERY EVENING  3/19/25   Eva De Paz MD   estradiol (Estrace) 0.01 % (0.1 mg/gram) vaginal cream Insert 0.25 Applicatorfuls (1 g) into the vagina 2 times a week. 1/20/25   Chelsey Arroyo MD   ferrous sulfate  mg ER tablet Take 1 tablet by mouth once daily. 4/14/15   Historical Provider, MD   folic acid (Folvite) 1 mg tablet Take 1 tablet (1 mg) by mouth once daily.    Historical Provider, MD   immune globulin, human, (Privigen) 10 % solution infusion orally every 3 weeks    Historical Provider, MD   ipratropium (Atrovent) 42 mcg (0.06 %) nasal spray Administer 2 sprays into each nostril 3 times a day. 9/30/15   Historical Provider, MD   levothyroxine (Synthroid, Levoxyl) 75 mcg tablet Take 1 tablet (75 mcg) by mouth once daily in the morning. Take before meals. Take 1 tablet daily 1/9/25   Eva De Paz MD   magnesium oxide (Mag-Ox) 250 mg magnesium tablet Take 1 tablet (250 mg) by mouth once daily.    Historical Provider, MD   multivitamin (Multiple Vitamins) tablet Take 1 tablet by mouth once daily. 4/8/20   Historical Provider, MD   potassium chloride ER (Micro-K) 10 mEq ER capsule TAKE 1 CAPSULE DAILY WITH FOOD 1/9/25   Eva De Paz MD   psyllium (Metamucil) 0.4 gram capsule Take 1 capsule by mouth 4 times a day as needed. 3/17/23   Historical Provider, MD   rOPINIRole (Requip) 4 mg tablet Take 1 tablet (4 mg) by mouth once daily at bedtime. 1/9/25   Eva De Paz MD   rosuvastatin (Crestor) 5 mg tablet Take one tablet qday 1/9/25   Eva De Paz MD   sodium chloride 3% nebulizer solution Take 3 mL by nebulization 3 times a day. 10/14/24   Crissy Gil MD   tretinoin (Retin-A) 0.025 % cream Apply topically once daily at bedtime. A pea-sized amount to the whole face, start every 2-3 nights and gradually increase to nightly 5/9/24 5/9/25  Deborah G Desi, MD        ROS  Review of Systems   Constitutional: Negative.    HENT: Negative.     Eyes: Negative.    Respiratory: Negative.      Cardiovascular: Negative.    Gastrointestinal: Negative.    Endocrine: Negative.    Genitourinary: Negative.    Musculoskeletal: Negative.    Neurological: Negative.    Psychiatric/Behavioral: Negative.          Blood, Urine   Date Value Ref Range Status   08/04/2024 NEGATIVE NEGATIVE Final     Nitrite, Urine   Date Value Ref Range Status   08/04/2024 NEGATIVE NEGATIVE Final     Urobilinogen, Urine   Date Value Ref Range Status   08/04/2024 Normal Normal mg/dL Final         PHYSICAL EXAM:      LMP  (LMP Unknown)      No LMP recorded (lmp unknown). Patient is postmenopausal.      Declines chaperone for physical exam.      Well developed, well nourished, in no apparent distress.   Neurologic/Psychiatric:  Awake, Alert and Oriented times 3.  Affect normal.     GENITAL/URINARY:       External Genitalia:  The patient has normal appearing external genitalia, normal skenes and bartholins glands, and a normal hair distribution.  Her vulva is without lesions, erythema or discharge.  It is non-tender with appropriate sensation.     Urethral Meatus:  Size normal, Location normal, Lesions absent, Prolapse absent,      Urethra:  Fullness absent, Masses absent,      Bladder:  Fullness absent, Masses absent, Tenderness absent,      Vagina:  General appearance normal, Estrogen effect normal, Discharge absent, Lesions absent     Cervix: Normal, no discharge.   Uterus:  normal size and mobile  Adnexa: normal     Anus/Perineum:  Lesions absent and Masses absent normal sphincter tone, no lesions  No Hemorrhoids, Normal Perineum        POP-Q:  Position: sitting    Aa: 0       Ba:  C: -7   Gh:  Pb:  TVL: 10         Ap: -2 Bp:  D: -8               Data and DIAGNOSTIC STUDIES REVIEWED   Imaging  No results found.    Cardiology, Vascular, and Other Imaging  No other imaging results found for the past 7 days     Lab Results   Component Value Date    URINECULTURE NO SIGNIFICANT GROWTH. 07/20/2023      Lab Results   Component Value Date     GLUCOSE 113 (H) 08/26/2024    CALCIUM 9.4 08/26/2024     08/26/2024    K 4.3 08/26/2024    CO2 27 08/26/2024     08/26/2024    BUN 19 09/06/2024    CREATININE 0.89 09/06/2024     Lab Results   Component Value Date    WBC 12.1 (H) 04/10/2025    HGB 11.8 04/10/2025    HCT 37.1 04/10/2025    MCV 89.8 04/10/2025     (H) 04/10/2025          Chelsey Arroyo MD             [1]   Past Medical History:  Diagnosis Date    Acute recurrent sinusitis 09/13/2023    Acute renal injury due to hypovolemia 08/05/2024    Acute thrombosis of left basilic vein 08/26/2024    Arthralgia of hip 02/12/2024    Atherosclerosis of aorta (CMS-HCC)     Bruises easily 02/12/2024    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     Cavitary lesion of lung 08/05/2024    Chest pain 02/12/2024    Chronic headache     Chronic rhinitis     Chronic rhinitis    Contact with and (suspected) exposure to pediculosis, acariasis and other infestations 07/24/2014    Scabies exposure    COPD (chronic obstructive pulmonary disease) (Multi)     Cramps of lower extremity 02/12/2024    Depression     Disorder of kidney and ureter, unspecified 01/19/2016    Mild renal insufficiency    DVT of axillary vein, acute left (Multi) 08/03/2024    Encounter for screening mammogram for malignant neoplasm of breast 12/09/2015    Other screening mammogram    Fall due to slipping on ice or snow 02/12/2024    GERD (gastroesophageal reflux disease)     Headache, unspecified 06/28/2017    Worsening headaches    Hemoptysis 10/25/2019    Cough with hemoptysis    History of cardiovascular disorder 02/12/2024    Hyperlipidemia     Hypotension determined by examination 03/14/2024    Hypothyroidism     Immunodeficiency (Multi)     Leukopenia     Menopausal and female climacteric states 07/21/2015    Menopausal symptoms    Migraine     Nausea 02/12/2024    Nondisplaced fracture of neck of right radius, initial encounter for closed fracture 05/08/2019    On apixaban therapy  08/05/2024    Osteopenia     Other conditions influencing health status     Acute Sphenoidal Sinusitis    Other conditions influencing health status     Pulmonary Disease    Pain in wrist 02/12/2024    Palpitations 09/13/2023    Pancreatic cyst ([facility])     Personal history of diseases of the blood and blood-forming organs and certain disorders involving the immune mechanism 05/27/2022    History of immunodeficiency    Personal history of other diseases of the circulatory system 12/07/2018    History of diastolic dysfunction    Personal history of other diseases of the circulatory system 11/05/2018    History of abnormal electrocardiography    Personal history of other diseases of the circulatory system 12/07/2018    History of cardiomyopathy    Personal history of other diseases of the circulatory system     History of peripheral vascular disease    Personal history of other diseases of the female genital tract 07/21/2015    History of postmenopausal bleeding    Personal history of other diseases of the female genital tract     History of pelvic inflammatory disease    Personal history of other diseases of the respiratory system 08/05/2013    Personal history of asthma    Personal history of other diseases of the respiratory system 01/27/2016    History of upper respiratory infection    Personal history of other diseases of the respiratory system 09/12/2013    History of upper respiratory infection    Personal history of other diseases of the respiratory system 12/16/2013    History of upper respiratory infection    Personal history of other diseases of the respiratory system 08/05/2013    History of allergic rhinitis    Personal history of other diseases of urinary system 03/14/2022    History of pyelonephritis    Personal history of other specified conditions 03/10/2016    History of abnormal mammogram    Personal history of other specified conditions 01/06/2020    History of chest pain    Personal history of  other specified conditions 11/21/2018    History of hemoptysis    Pneumonia, unspecified organism 11/06/2015    CAP (community acquired pneumonia)    Postmenopausal bleeding 09/13/2023    Pseudomonas aeruginosa infection 02/12/2024    Rash 02/12/2024    Rash and other nonspecific skin eruption 12/08/2014    Rash of neck    Restless legs     Suspected sleep apnea     Trochanteric bursitis, unspecified hip 04/18/2014    Trochanteric bursitis    Urinary tract infection, site not specified 02/10/2022    Acute UTI    Uterine prolapse    [2]   Past Surgical History:  Procedure Laterality Date    BRONCHOSCOPY      DILATION AND CURETTAGE OF UTERUS  10/08/2012    Dilation And Curettage    HERNIA REPAIR  10/08/2012    Hernia Repair

## 2025-05-08 ENCOUNTER — OFFICE VISIT (OUTPATIENT)
Dept: OBSTETRICS AND GYNECOLOGY | Facility: CLINIC | Age: 75
End: 2025-05-08
Payer: MEDICARE

## 2025-05-08 VITALS
HEIGHT: 66 IN | DIASTOLIC BLOOD PRESSURE: 74 MMHG | WEIGHT: 122 LBS | HEART RATE: 108 BPM | BODY MASS INDEX: 19.61 KG/M2 | SYSTOLIC BLOOD PRESSURE: 123 MMHG

## 2025-05-08 DIAGNOSIS — N39.0 RECURRENT UTI: Primary | ICD-10-CM

## 2025-05-08 LAB
POC APPEARANCE, URINE: CLEAR
POC BILIRUBIN, URINE: NEGATIVE
POC BLOOD, URINE: NEGATIVE
POC COLOR, URINE: YELLOW
POC GLUCOSE, URINE: NEGATIVE MG/DL
POC KETONES, URINE: NEGATIVE MG/DL
POC LEUKOCYTES, URINE: ABNORMAL
POC NITRITE,URINE: NEGATIVE
POC PH, URINE: 6 PH
POC PROTEIN, URINE: NEGATIVE MG/DL
POC SPECIFIC GRAVITY, URINE: 1.01
POC UROBILINOGEN, URINE: 0.2 EU/DL

## 2025-05-08 PROCEDURE — 1126F AMNT PAIN NOTED NONE PRSNT: CPT | Performed by: OBSTETRICS & GYNECOLOGY

## 2025-05-08 PROCEDURE — 99213 OFFICE O/P EST LOW 20 MIN: CPT | Performed by: OBSTETRICS & GYNECOLOGY

## 2025-05-08 PROCEDURE — 51798 US URINE CAPACITY MEASURE: CPT | Performed by: OBSTETRICS & GYNECOLOGY

## 2025-05-08 PROCEDURE — 1159F MED LIST DOCD IN RCRD: CPT | Performed by: OBSTETRICS & GYNECOLOGY

## 2025-05-08 PROCEDURE — 99213 OFFICE O/P EST LOW 20 MIN: CPT | Mod: 25 | Performed by: OBSTETRICS & GYNECOLOGY

## 2025-05-08 PROCEDURE — 81003 URINALYSIS AUTO W/O SCOPE: CPT | Mod: QW | Performed by: OBSTETRICS & GYNECOLOGY

## 2025-05-08 ASSESSMENT — ENCOUNTER SYMPTOMS
ENDOCRINE NEGATIVE: 1
PSYCHIATRIC NEGATIVE: 1
RESPIRATORY NEGATIVE: 1
CONSTITUTIONAL NEGATIVE: 1
CARDIOVASCULAR NEGATIVE: 1
GASTROINTESTINAL NEGATIVE: 1
MUSCULOSKELETAL NEGATIVE: 1
EYES NEGATIVE: 1
NEUROLOGICAL NEGATIVE: 1

## 2025-05-08 ASSESSMENT — PAIN SCALES - GENERAL: PAINLEVEL_OUTOF10: 0-NO PAIN

## 2025-05-12 ENCOUNTER — TELEMEDICINE (OUTPATIENT)
Dept: CARDIOLOGY | Facility: HOSPITAL | Age: 75
End: 2025-05-12
Payer: MEDICARE

## 2025-05-12 DIAGNOSIS — I70.0 ATHEROSCLEROSIS OF AORTA: ICD-10-CM

## 2025-05-12 DIAGNOSIS — D84.9 IMMUNODEFICIENCY (MULTI): ICD-10-CM

## 2025-05-12 PROCEDURE — 99213 OFFICE O/P EST LOW 20 MIN: CPT | Performed by: INTERNAL MEDICINE

## 2025-05-12 PROCEDURE — 1036F TOBACCO NON-USER: CPT | Performed by: INTERNAL MEDICINE

## 2025-05-12 PROCEDURE — 1159F MED LIST DOCD IN RCRD: CPT | Performed by: INTERNAL MEDICINE

## 2025-05-12 ASSESSMENT — DERMATOLOGY QUALITY OF LIFE (QOL) ASSESSMENT
RATE HOW BOTHERED YOU ARE BY EFFECTS OF YOUR SKIN PROBLEMS ON YOUR ACTIVITIES (EG, GOING OUT, ACCOMPLISHING WHAT YOU WANT, WORK ACTIVITIES OR YOUR RELATIONSHIPS WITH OTHERS): 0 - NEVER BOTHERED
RATE HOW EMOTIONALLY BOTHERED YOU ARE BY YOUR SKIN PROBLEM (FOR EXAMPLE, WORRY, EMBARRASSMENT, FRUSTRATION): 0 - NEVER BOTHERED
RATE HOW EMOTIONALLY BOTHERED YOU ARE BY YOUR SKIN PROBLEM (FOR EXAMPLE, WORRY, EMBARRASSMENT, FRUSTRATION): 0 - NEVER BOTHERED
RATE HOW BOTHERED YOU ARE BY EFFECTS OF YOUR SKIN PROBLEMS ON YOUR ACTIVITIES (EG, GOING OUT, ACCOMPLISHING WHAT YOU WANT, WORK ACTIVITIES OR YOUR RELATIONSHIPS WITH OTHERS): 0 - NEVER BOTHERED
RATE HOW BOTHERED YOU ARE BY SYMPTOMS OF YOUR SKIN PROBLEM (EG, ITCHING, STINGING BURNING, HURTING OR SKIN IRRITATION): 0 - NEVER BOTHERED
RATE HOW BOTHERED YOU ARE BY SYMPTOMS OF YOUR SKIN PROBLEM (EG, ITCHING, STINGING BURNING, HURTING OR SKIN IRRITATION): 0 - NEVER BOTHERED

## 2025-05-12 ASSESSMENT — PATIENT GLOBAL ASSESSMENT (PGA): WHAT IS THE PGA: PATIENT GLOBAL ASSESSMENT:  1 - CLEAR

## 2025-05-12 NOTE — PROGRESS NOTES
05/12/25    Vascular Medicine    Virtual or Telephone Consent    An interactive audio and video telecommunication system which permits real time communications between the patient (at the originating site) and provider (at the distant site) was utilized to provide this telehealth service.   Verbal consent was requested and obtained from Abad Yan on this date, 05/12/25 for a telehealth visit and the patient's location was confirmed at the time of the visit.    Ms.Hagesfeld Yan is seen in follow-up of left arm line related DVT. This was dx 8.3.2024 and she was on Eliquis 5 mg Q12 through 11.2024. She has immunodeficiency and recurrent lung infections/bronchiectasis.    Since I saw her last, she stopped Eliquis with her line out but unfortunately had recurrent infection (pseudomonas) and PICC Line went in mid January. I put her on Xarelto 10 mg/day and she continued this until the PICC Line came out. No arm swelling or cords.  No problems with Xarelto.  She has some varicose veins, wears compression hose. She is now on inhaled colistin and off IV Abx.    Her PLT count ~ 520K, concerned in terms of ? Clotting risk.      Patient Active Problem List   Diagnosis    Bronchiectasis with acute exacerbation (Multi)    Pseudomonas aeruginosa infection    Chronic bilateral low back pain with left-sided sciatica    Chronic headache    Chronic pain in left foot    Chronic left shoulder pain    Common variable immunodeficiency    Cystocele with uterine prolapse    Depression, recurrent (CMS-HCC)    Recurrent UTI    Dyslipidemia    GERD (gastroesophageal reflux disease)    Hypokalemia    Hypothyroidism    Immunodeficiency (Multi)    Hypersomnia    Leukopenia    Malaise    Melanocytic nevi of trunk    Migraine syndrome    Multinodular goiter    Neoplasm of uncertain behavior of pancreas    Osteopenia    Pancreatic cyst ([facility]-HCC)    Physical deconditioning    Postmenopausal atrophic vaginitis    Postural dizziness     Pyelonephritis    Restless legs syndrome    Rhytidosis facialis    Sciatic radiculitis    Snores    Suspected sleep apnea    Vitamin D deficiency    Age related osteoporosis    Insomnia    SNHL (sensorineural hearing loss)    Varicose veins of both lower extremities    Tarlov cysts    Varicose veins of lower extremity    Visceral larva migrans syndrome    Muscle weakness on examination    Atherosclerosis of aorta (CMS-HCC)    Mycobacterium avium-intracellulare infection (Multi)    Wellness examination    Cavitary lesion of lung    Immunoglobulin deficiency (Multi)    Iron deficiency anemia    Exudative age-related macular degeneration of left eye with active choroidal neovascularization    Hyperglycemia     Current Outpatient Medications   Medication Sig Dispense Refill    aflibercept (Eylea) 2 mg/0.05 mL intra-ocular injection 0.05 mL (2 mg) by intravitreal route 1 time.      albuterol 2.5 mg /3 mL (0.083 %) nebulizer solution Take 3 mL (2.5 mg) by nebulization 3 times a day. 810 mL 3    azelastine (Astelin) 137 mcg (0.1 %) nasal spray Administer 2 sprays into each nostril 2 times a day as needed.      CALCIUM ALGINATE, BULK, MISC 5 mL once daily.      calcium carbonate (Tums) 200 mg calcium chewable tablet Chew 1 tablet (500 mg) 3 times a day.      CALCIUM CARBONATE-VITAMIN D3 ORAL Take 1 tablet by mouth once daily.      coenzyme Q-10 100 mg capsule Take 1 capsule (100 mg) by mouth.      cranberry 500 mg capsule Take 1 capsule by mouth once daily.      escitalopram (Lexapro) 5 mg tablet TAKE 1 TABLET EVERY EVENING 90 tablet 1    ferrous sulfate  mg ER tablet Take 1 tablet by mouth once daily.      folic acid (Folvite) 1 mg tablet Take 1 tablet (1 mg) by mouth once daily.      immune globulin, human, (Privigen) 10 % solution infusion orally every 3 weeks      ipratropium (Atrovent) 42 mcg (0.06 %) nasal spray Administer 2 sprays into each nostril 3 times a day.      levothyroxine (Synthroid, Levoxyl) 75 mcg  tablet Take 1 tablet (75 mcg) by mouth once daily in the morning. Take before meals. Take 1 tablet daily 100 tablet 1    magnesium oxide (Mag-Ox) 250 mg magnesium tablet Take 1 tablet (250 mg) by mouth once daily.      potassium chloride ER (Micro-K) 10 mEq ER capsule TAKE 1 CAPSULE DAILY WITH FOOD 90 capsule 1    psyllium (Metamucil) 0.4 gram capsule Take 1 capsule by mouth 4 times a day as needed.      rOPINIRole (Requip) 4 mg tablet Take 1 tablet (4 mg) by mouth once daily at bedtime. 90 tablet 1    rosuvastatin (Crestor) 5 mg tablet Take one tablet qday 90 tablet 1    sodium chloride 3% nebulizer solution Take 3 mL by nebulization 3 times a day. 810 mL 3     No current facility-administered medications for this visit.     On exam:  She is in no distress  JVP not elevated  NO head/neck swelling      Recent labs 4.10.2025  HgB 11.8  WBC 12.1  PLT 521K (baseline 300s)    2.11.2025 Cr 0.76    8.3.2024  Venous Duplex  IMPRESSION:  1.  Lack of flow within the left axillary vein with an intraluminal  PICC, compatible with thrombosis.  2. Partial compressibility of the left brachial vein, and left  basilic vein with intraluminal PICC. Persistent flow is noted within  these veins, however a partially occlusive thrombus can not be  excluded.      Assessment/Plan: 74 y.o. woman with left axillary/brachial DVT + SVT related to PICC line who was rx with 3+ months of Eliquis until PICC Line removed. She had another PICC in place this winter, was on Xarelto 10 mg/day with no issues. PICC Is out and she is off Xarelto.    She is doing well overall; I am not too concerned about PLT 521K -- suspect reactive and will be repeated.  We again discussed how  I would recommend VTE prophylaxis for future line placement, could be Xarelto 10 mg QD as she did earlier this year.    She wears light compression for mild varicose veins, will continue.    RTC 1 year or sooner PRN.      Shawna Jamil MD

## 2025-05-12 NOTE — LETTER
May 12, 2025     Eva De Paz MD  [facility], [address]  [address] [facility]   [facility] [address]    Patient: ABAD Yan   YOB: 1950   Date of Visit: 5/12/2025       Dear Dr. Eva De Paz MD:    Thank you for referring ABAD Yan to me for evaluation. Below are my notes for this consultation.  If you have questions, please do not hesitate to call me. I look forward to following your patient along with you.       Sincerely,     Shawna Jamil MD      CC: No Recipients  ______________________________________________________________________________________    05/12/25    Vascular Medicine    Virtual or Telephone Consent    An interactive audio and video telecommunication system which permits real time communications between the patient (at the originating site) and provider (at the distant site) was utilized to provide this telehealth service.   Verbal consent was requested and obtained from Abad Yan on this date, 05/12/25 for a telehealth visit and the patient's location was confirmed at the time of the visit.    Ms.Hagesfeld Yan is seen in follow-up of left arm line related DVT. This was dx 8.3.2024 and she was on Eliquis 5 mg Q12 through 11.2024. She has immunodeficiency and recurrent lung infections/bronchiectasis.    Since I saw her last, she stopped Eliquis with her line out but unfortunately had recurrent infection (pseudomonas) and PICC Line went in mid January. I put her on Xarelto 10 mg/day and she continued this until the PICC Line came out. No arm swelling or cords.  No problems with Xarelto.  She has some varicose veins, wears compression hose. She is now on inhaled colistin and off IV Abx.    Her PLT count ~ 520K, concerned in terms of ? Clotting risk.      Patient Active Problem List   Diagnosis   • Bronchiectasis with acute exacerbation (Multi)   • Pseudomonas aeruginosa infection   • Chronic bilateral low  back pain with left-sided sciatica   • Chronic headache   • Chronic pain in left foot   • Chronic left shoulder pain   • Common variable immunodeficiency   • Cystocele with uterine prolapse   • Depression, recurrent (CMS-HCC)   • Recurrent UTI   • Dyslipidemia   • GERD (gastroesophageal reflux disease)   • Hypokalemia   • Hypothyroidism   • Immunodeficiency (Multi)   • Hypersomnia   • Leukopenia   • Malaise   • Melanocytic nevi of trunk   • Migraine syndrome   • Multinodular goiter   • Neoplasm of uncertain behavior of pancreas   • Osteopenia   • Pancreatic cyst (HHS-HCC)   • Physical deconditioning   • Postmenopausal atrophic vaginitis   • Postural dizziness   • Pyelonephritis   • Restless legs syndrome   • Rhytidosis facialis   • Sciatic radiculitis   • Snores   • Suspected sleep apnea   • Vitamin D deficiency   • Age related osteoporosis   • Insomnia   • SNHL (sensorineural hearing loss)   • Varicose veins of both lower extremities   • Tarlov cysts   • Varicose veins of lower extremity   • Visceral larva migrans syndrome   • Muscle weakness on examination   • Atherosclerosis of aorta (CMS-HCC)   • Mycobacterium avium-intracellulare infection (Multi)   • Wellness examination   • Cavitary lesion of lung   • Immunoglobulin deficiency (Multi)   • Iron deficiency anemia   • Exudative age-related macular degeneration of left eye with active choroidal neovascularization   • Hyperglycemia     Current Outpatient Medications   Medication Sig Dispense Refill   • aflibercept (Eylea) 2 mg/0.05 mL intra-ocular injection 0.05 mL (2 mg) by intravitreal route 1 time.     • albuterol 2.5 mg /3 mL (0.083 %) nebulizer solution Take 3 mL (2.5 mg) by nebulization 3 times a day. 810 mL 3   • azelastine (Astelin) 137 mcg (0.1 %) nasal spray Administer 2 sprays into each nostril 2 times a day as needed.     • CALCIUM ALGINATE, BULK, MISC 5 mL once daily.     • calcium carbonate (Tums) 200 mg calcium chewable tablet Chew 1 tablet (500 mg)  3 times a day.     • CALCIUM CARBONATE-VITAMIN D3 ORAL Take 1 tablet by mouth once daily.     • coenzyme Q-10 100 mg capsule Take 1 capsule (100 mg) by mouth.     • cranberry 500 mg capsule Take 1 capsule by mouth once daily.     • escitalopram (Lexapro) 5 mg tablet TAKE 1 TABLET EVERY EVENING 90 tablet 1   • ferrous sulfate  mg ER tablet Take 1 tablet by mouth once daily.     • folic acid (Folvite) 1 mg tablet Take 1 tablet (1 mg) by mouth once daily.     • immune globulin, human, (Privigen) 10 % solution infusion orally every 3 weeks     • ipratropium (Atrovent) 42 mcg (0.06 %) nasal spray Administer 2 sprays into each nostril 3 times a day.     • levothyroxine (Synthroid, Levoxyl) 75 mcg tablet Take 1 tablet (75 mcg) by mouth once daily in the morning. Take before meals. Take 1 tablet daily 100 tablet 1   • magnesium oxide (Mag-Ox) 250 mg magnesium tablet Take 1 tablet (250 mg) by mouth once daily.     • potassium chloride ER (Micro-K) 10 mEq ER capsule TAKE 1 CAPSULE DAILY WITH FOOD 90 capsule 1   • psyllium (Metamucil) 0.4 gram capsule Take 1 capsule by mouth 4 times a day as needed.     • rOPINIRole (Requip) 4 mg tablet Take 1 tablet (4 mg) by mouth once daily at bedtime. 90 tablet 1   • rosuvastatin (Crestor) 5 mg tablet Take one tablet qday 90 tablet 1   • sodium chloride 3% nebulizer solution Take 3 mL by nebulization 3 times a day. 810 mL 3     No current facility-administered medications for this visit.     On exam:  She is in no distress  JVP not elevated  NO head/neck swelling      Recent labs 4.10.2025  HgB 11.8  WBC 12.1  PLT 521K (baseline 300s)    2.11.2025 Cr 0.76    8.3.2024  Venous Duplex  IMPRESSION:  1.  Lack of flow within the left axillary vein with an intraluminal  PICC, compatible with thrombosis.  2. Partial compressibility of the left brachial vein, and left  basilic vein with intraluminal PICC. Persistent flow is noted within  these veins, however a partially occlusive thrombus can  not be  excluded.      Assessment/Plan: 74 y.o. woman with left axillary/brachial DVT + SVT related to PICC line who was rx with 3+ months of Eliquis until PICC Line removed. She had another PICC in place this winter, was on Xarelto 10 mg/day with no issues. PICC Is out and she is off Xarelto.    She is doing well overall; I am not too concerned about PLT 521K -- suspect reactive and will be repeated.  We again discussed how  I would recommend VTE prophylaxis for future line placement, could be Xarelto 10 mg QD as she did earlier this year.    She wears light compression for mild varicose veins, will continue.    RTC 1 year or sooner PRN.      Shawna Jamil MD

## 2025-05-12 NOTE — PATIENT INSTRUCTIONS
Continue current meds.    See you back in 1 year for clinical check in.    Shawna Jamil MD  Co-Director, Vascular Center  [facility], [facility]   [facility] Master Clinician in Fibromuscular Dysplasia and Vascular Care  Professor of Medicine  [facility]

## 2025-05-13 ENCOUNTER — APPOINTMENT (OUTPATIENT)
Dept: DERMATOLOGY | Facility: CLINIC | Age: 75
End: 2025-05-13
Payer: MEDICARE

## 2025-05-13 DIAGNOSIS — L81.4 LENTIGO: ICD-10-CM

## 2025-05-13 DIAGNOSIS — L57.8 ACTINIC SKIN DAMAGE: ICD-10-CM

## 2025-05-13 DIAGNOSIS — Z12.83 SCREENING EXAM FOR SKIN CANCER: ICD-10-CM

## 2025-05-13 DIAGNOSIS — L57.0 ACTINIC KERATOSIS: Primary | ICD-10-CM

## 2025-05-13 DIAGNOSIS — D22.9 MULTIPLE BENIGN NEVI: ICD-10-CM

## 2025-05-13 DIAGNOSIS — D84.9 IMMUNOCOMPROMISED STATE: ICD-10-CM

## 2025-05-13 DIAGNOSIS — L82.1 SEBORRHEIC KERATOSIS: ICD-10-CM

## 2025-05-13 PROCEDURE — 1036F TOBACCO NON-USER: CPT | Performed by: DERMATOLOGY

## 2025-05-13 PROCEDURE — 99213 OFFICE O/P EST LOW 20 MIN: CPT | Performed by: DERMATOLOGY

## 2025-05-13 PROCEDURE — 17003 DESTRUCT PREMALG LES 2-14: CPT | Performed by: DERMATOLOGY

## 2025-05-13 PROCEDURE — 1159F MED LIST DOCD IN RCRD: CPT | Performed by: DERMATOLOGY

## 2025-05-13 PROCEDURE — 1160F RVW MEDS BY RX/DR IN RCRD: CPT | Performed by: DERMATOLOGY

## 2025-05-13 PROCEDURE — 17000 DESTRUCT PREMALG LESION: CPT | Performed by: DERMATOLOGY

## 2025-05-13 ASSESSMENT — DERMATOLOGY QUALITY OF LIFE (QOL) ASSESSMENT
RATE HOW BOTHERED YOU ARE BY EFFECTS OF YOUR SKIN PROBLEMS ON YOUR ACTIVITIES (EG, GOING OUT, ACCOMPLISHING WHAT YOU WANT, WORK ACTIVITIES OR YOUR RELATIONSHIPS WITH OTHERS): 0 - NEVER BOTHERED
RATE HOW BOTHERED YOU ARE BY SYMPTOMS OF YOUR SKIN PROBLEM (EG, ITCHING, STINGING BURNING, HURTING OR SKIN IRRITATION): 0 - NEVER BOTHERED
RATE HOW EMOTIONALLY BOTHERED YOU ARE BY YOUR SKIN PROBLEM (FOR EXAMPLE, WORRY, EMBARRASSMENT, FRUSTRATION): 0 - NEVER BOTHERED
ARE THERE EXCLUSIONS OR EXCEPTIONS FOR THE QUALITY OF LIFE ASSESSMENT: NO
DATE THE QUALITY-OF-LIFE ASSESSMENT WAS COMPLETED: 67338

## 2025-05-13 ASSESSMENT — DERMATOLOGY PATIENT ASSESSMENT
DO YOU HAVE ANY NEW OR CHANGING LESIONS: NO
DO YOU USE SUNSCREEN: OCCASIONALLY
HAVE YOU HAD OR DO YOU HAVE A STAPH INFECTION: NO
ARE YOU AN ORGAN TRANSPLANT RECIPIENT: NO
DO YOU USE A TANNING BED: NO
DO YOU HAVE IRREGULAR MENSTRUAL CYCLES: NO
ARE YOU TRYING TO GET PREGNANT: NO
ARE YOU ON BIRTH CONTROL: NO
HAVE YOU HAD OR DO YOU HAVE VASCULAR DISEASE: NO

## 2025-05-13 ASSESSMENT — ITCH NUMERIC RATING SCALE: HOW SEVERE IS YOUR ITCHING?: 0

## 2025-05-13 ASSESSMENT — PATIENT GLOBAL ASSESSMENT (PGA): PATIENT GLOBAL ASSESSMENT: PATIENT GLOBAL ASSESSMENT:  1 - CLEAR

## 2025-05-13 NOTE — PROGRESS NOTES
Subjective     Abad Yan is a 74 y.o. female who presents for the following: Skin Check. Last derm visit 8/14/24 for actinic keratosis, eyelid dermatitis, BLK. Last Full Skin Exam 5/9/24. History of actinic keratosis.     Intake Questions  Do you have any new or changing Lesions?: No  For patients coming in for a Follow-up Visit:  Have there been any changes in your health since your last visit?: (Patient-Rptd) (P) Yes, I’ve had pneumonia 4 times.  Are you an organ transplant recipient?: No  Have you had or do you have a Staph Infection?: No  Have you had or do you have Vacular Disease?: No  Do you use sunscreen?: Occasionally  Do you use a tanning bed?: No  Are you trying to get pregnant?: No  Are you on birth control?: No  Do you have irregular menstrual cycles?: No    Review of Systems:  No other skin or systemic complaints other than what is documented elsewhere in the note.    The following portions of the chart were reviewed this encounter and updated as appropriate:  Tobacco  Allergies  Meds  Problems  Med Hx  Surg Hx         Skin Cancer History  Biopsy Log Book  No skin cancers from Specimen Tracking.    Additional History      Specialty Problems          Dermatology Problems    Melanocytic nevi of trunk    Rhytidosis facialis        Objective   Well appearing patient in no apparent distress; mood and affect are within normal limits.    A full examination was performed including scalp, head, eyes, ears, nose, lips, neck, chest, axillae, abdomen, back, buttocks, bilateral upper extremities, bilateral lower extremities, hands, feet, fingers, toes, fingernails, and toenails. All findings within normal limits unless otherwise noted below.    Assessment/Plan   Skin Exam  1. ACTINIC KERATOSIS (3)  Left Malar Cheek, Right Forehead, Right Lower Eyelid  Erythematous scaly macule(s)  -Discussed nature of diagnosis and treatment options.   - the one on right lower eyelid was treated 5/9/24 and  8/14/24. There is the tiniest little papule left that we treated again today  - the other actinic keratoses previously treated are now resolved  - the two on left malar cheek and right forehead are new or never treated before  -Patient wishes to proceed with Cryotherapy today      -Possible side effects of liquid nitrogen treatment reviewed including formation of blisters, crusting, tenderness, scar, and discoloration which may be permanent.  -Patient advised to return the office for re-evaluation if the treated lesion(s) do not resolve within 4-6 weeks. Patient verbalizes understanding.  Destr of lesion - Left Malar Cheek, Right Forehead, Right Lower Eyelid  Complexity: simple    Destruction method: cryotherapy    Informed consent: discussed and consent obtained    Lesion destroyed using liquid nitrogen: Yes    Outcome: patient tolerated procedure well with no complications    Post-procedure details: wound care instructions given    2. SCREENING EXAM FOR SKIN CANCER  Generalized  As part of a routine Full Skin Exam, a genital examination with the presence of a chaperone was offered. The patient agreed to  the exam and declined the chaperone.     Full body skin exam  -No lesions concerning for malignancy on the remainder the skin exam today   - The ugly duckling sign was discussed. Monitor for any skin lesions that are different in color, shape, or size than others on body  -Sun protection was discussed. Recommend SPF 30+, hats with brims, sun protective clothing, and avoiding sun exposure between 10 AM and 2 PM whenever possible  -Recommend regular skin exams or sooner if new or changing lesions     Related Procedures  Follow Up In Dermatology - Established Patient  Follow Up In Dermatology - Established Patient  3. MULTIPLE BENIGN NEVI  Generalized  Brown and tan macules and papules with reassuring findings on dermoscopy  -These lesions have benign, reassuring patterns on dermoscopy  -Recommend continued self  observation, and to contact the office if any changes in nevi are noticed  4. LENTIGO  Generalized  Tan macules  -Benign appearing on exam  -Reassurance, recommend observation  5. SEBORRHEIC KERATOSIS  Generalized  Stuck on, waxy macule(s)/papule(s)/plaque(s) with comedo-like openings and milia like cysts  -Discussed the nature of the diagnosis  -Reassurance, recommend continued observation  6. ACTINIC SKIN DAMAGE  Generalized  Background of photodamage with hyper- and hypo-pigmented macules on the skin  7. IMMUNOCOMPROMISED STATE  Generalized  immunosuppressed on IVIG     Follow up 1 year Full Skin Exam or sooner as needed

## 2025-05-13 NOTE — Clinical Note
As part of a routine Full Skin Exam, a genital examination with the presence of a chaperone was offered. The patient agreed to  the exam and declined the chaperone.

## 2025-05-13 NOTE — Clinical Note
-Discussed nature of diagnosis and treatment options.   - the one on right lower eyelid was treated 5/9/24 and 8/14/24. There is the tiniest little papule left that we treated again today  - the other actinic keratoses previously treated are now resolved  - the two on left malar cheek and right forehead are new or never treated before  -Patient wishes to proceed with Cryotherapy today      -Possible side effects of liquid nitrogen treatment reviewed including formation of blisters, crusting, tenderness, scar, and discoloration which may be permanent.  -Patient advised to return the office for re-evaluation if the treated lesion(s) do not resolve within 4-6 weeks. Patient verbalizes understanding.

## 2025-05-19 ENCOUNTER — NUTRITION (OUTPATIENT)
Dept: NUTRITION | Facility: CLINIC | Age: 75
End: 2025-05-19
Payer: MEDICARE

## 2025-05-19 VITALS — WEIGHT: 117 LBS | BODY MASS INDEX: 18.88 KG/M2

## 2025-05-19 DIAGNOSIS — E11.9 TYPE 2 DIABETES MELLITUS WITHOUT COMPLICATION, WITHOUT LONG-TERM CURRENT USE OF INSULIN: ICD-10-CM

## 2025-05-19 PROCEDURE — 97802 MEDICAL NUTRITION INDIV IN: CPT | Performed by: DIETITIAN, REGISTERED

## 2025-05-19 NOTE — PROGRESS NOTES
Reason for Nutrition Visit:  Pt is a 74 y.o. female being seen at [facility]. Pt was referred by Eva De Paz MD effective 04/15/25.   1. Type 2 diabetes mellitus without complication, without long-term current use of insulin  Referral to Nutrition Services           Past Medical Hx:  Problem List[1]   Patient Active Problem List  as of 5/19/2025 2:16 PM      Diagnosis    Bronchiectasis with acute exacerbation (Multi)    Pseudomonas aeruginosa infection    Chronic bilateral low back pain with left-sided sciatica    Chronic headache    Chronic pain in left foot    Chronic left shoulder pain    Common variable immunodeficiency    Cystocele with uterine prolapse    Depression, recurrent (CMS-HCC)    Recurrent UTI    Dyslipidemia    GERD (gastroesophageal reflux disease)    Hypokalemia    Hypothyroidism    Immunodeficiency (Multi)    Hypersomnia    Leukopenia    Malaise    Melanocytic nevi of trunk    Migraine syndrome    Multinodular goiter    Neoplasm of uncertain behavior of pancreas    Osteopenia    Pancreatic cyst (HHS-HCC)    Physical deconditioning    Postmenopausal atrophic vaginitis    Postural dizziness    Pyelonephritis    Restless legs syndrome    Rhytidosis facialis    Sciatic radiculitis    Snores    Suspected sleep apnea    Vitamin D deficiency    Age related osteoporosis    Insomnia    SNHL (sensorineural hearing loss)    Varicose veins of both lower extremities    Tarlov cysts    Varicose veins of lower extremity    Visceral larva migrans syndrome    Muscle weakness on examination    Atherosclerosis of aorta (CMS-HCC)    Mycobacterium avium-intracellulare infection (Multi)    Wellness examination    Cavitary lesion of lung    Immunoglobulin deficiency (Multi)    Iron deficiency anemia    Exudative age-related macular degeneration of left eye with active choroidal neovascularization    Hyperglycemia       Current Medications[2]   Current Medications  as of 5/19/2025 2:16 PM     Current Outpatient  Medications:     aflibercept (Eylea) 2 mg/0.05 mL intra-ocular injection, 0.05 mL (2 mg) by intravitreal route 1 time., Disp: , Rfl:     albuterol 2.5 mg /3 mL (0.083 %) nebulizer solution, Take 3 mL (2.5 mg) by nebulization 3 times a day., Disp: 810 mL, Rfl: 3    azelastine (Astelin) 137 mcg (0.1 %) nasal spray, Administer 2 sprays into each nostril 2 times a day as needed., Disp: , Rfl:     CALCIUM ALGINATE, BULK, MISC, 5 mL once daily., Disp: , Rfl:     calcium carbonate (Tums) 200 mg calcium chewable tablet, Chew 1 tablet (500 mg) 3 times a day., Disp: , Rfl:     CALCIUM CARBONATE-VITAMIN D3 ORAL, Take 1 tablet by mouth once daily., Disp: , Rfl:     coenzyme Q-10 100 mg capsule, Take 1 capsule (100 mg) by mouth., Disp: , Rfl:     cranberry 500 mg capsule, Take 1 capsule by mouth once daily., Disp: , Rfl:     escitalopram (Lexapro) 5 mg tablet, TAKE 1 TABLET EVERY EVENING, Disp: 90 tablet, Rfl: 1    ferrous sulfate  mg ER tablet, Take 1 tablet by mouth once daily., Disp: , Rfl:     folic acid (Folvite) 1 mg tablet, Take 1 tablet (1 mg) by mouth once daily., Disp: , Rfl:     immune globulin, human, (Privigen) 10 % solution infusion, orally every 3 weeks, Disp: , Rfl:     ipratropium (Atrovent) 42 mcg (0.06 %) nasal spray, Administer 2 sprays into each nostril 3 times a day., Disp: , Rfl:     levothyroxine (Synthroid, Levoxyl) 75 mcg tablet, Take 1 tablet (75 mcg) by mouth once daily in the morning. Take before meals. Take 1 tablet daily, Disp: 100 tablet, Rfl: 1    magnesium oxide (Mag-Ox) 250 mg magnesium tablet, Take 1 tablet (250 mg) by mouth once daily., Disp: , Rfl:     potassium chloride ER (Micro-K) 10 mEq ER capsule, TAKE 1 CAPSULE DAILY WITH FOOD, Disp: 90 capsule, Rfl: 1    psyllium (Metamucil) 0.4 gram capsule, Take 1 capsule by mouth 4 times a day as needed., Disp: , Rfl:     rOPINIRole (Requip) 4 mg tablet, Take 1 tablet (4 mg) by mouth once daily at bedtime., Disp: 90 tablet, Rfl: 1     "rosuvastatin (Crestor) 5 mg tablet, Take one tablet qday, Disp: 90 tablet, Rfl: 1    sodium chloride 3% nebulizer solution, Take 3 mL by nebulization 3 times a day., Disp: 810 mL, Rfl: 3    Anthropometrics:  Anthropometrics  Height: 167.6 cm (5' 5.98\")  Weight: 53.1 kg (117 lb)  BMI (Calculated): 18.89   Weight change:  4% weight loss.   Significant Weight Change: No    Lab Results   Component Value Date    HGBA1C 7.0 (H) 04/10/2025    CHOL 144 09/06/2024    LDLF 67 07/12/2023    TRIG 102 09/06/2024    HDL 47.4 09/06/2024          Chemistry    Lab Results   Component Value Date/Time     08/26/2024 1604    K 4.3 08/26/2024 1604     08/26/2024 1604    CO2 27 08/26/2024 1604    BUN 19 09/06/2024 0903    CREATININE 0.89 09/06/2024 0903    Lab Results   Component Value Date/Time    CALCIUM 9.4 08/26/2024 1604    ALKPHOS 59 08/03/2024 1309    AST 15 08/03/2024 1309    ALT 8 08/03/2024 1309    BILITOT 0.3 08/03/2024 1309        Lab Results   Component Value Date    WBC 12.1 (H) 04/10/2025    HGB 11.8 04/10/2025    HCT 37.1 04/10/2025    MCV 89.8 04/10/2025     (H) 04/10/2025      No results found for: \"[id number]\"    Food and Nutrition Hx:  Pt states she would like assistance with her diet. She has a Common variable immunodeficiency, also called CVID, is an immune system disorder that causes low levels of the proteins in the body that help fight infections. She has repeated infections in her lungs. Her flare ups can influence appetite, increase nausea, has more mucus production and she becomes tired. She has not been in a flare for a while .  She just found out she has diabetes. From this, she started to reduce CHO intake and this lead to weight loss. She reports feeling strong and has energy. Pt is not testing at home. She is not on any diabetes medication.   Pt wakes up at 8:00 am. 2 -3 meals are consumed per day.     24 Diet Recall:  She does therapy exercises for about 1 hour in the morning.   Meal " 1: Breakfast is at 10:30 to include a protein waffle with 3 T of peanut butter (kcal 400, CHO 25, protein 25)  Meal 2: Lunch is at 2:00 to include 1 cup of broccoli, 4 crackers, and 0.3 cup of guacamole   Meal 3: Dinner may be at 7:00- 8:00 to include can of soup and 12 ounces of milk, 0.25 cup almonds, and 4 crackers (CHO 31, protein 17)   Snacks: no sugar added ice cream with diet root beer   Beverages: water, v8, diet root beer.   Average protein intake is     Allergies: None  Intolerance: None  Appetite: Fluctuates  Intake: >75%  GI Symptoms : reflux Frequency: rare  Swallowing Difficulty: No problems with swallowing  Dentition : own    Types of Activities: Walking  30 minutes of walking a few times a week.     Sleep duration/quality : 7+ hours. Pt needs 9 hours of sleep to wake feeling good.    Supplements: see above      Energy Levels: Fluctuates    Food Preparation: Patient  Cooking Skills/Barriers: Low preference for cooking  Grocery Shopping: Patient      Nutrition Focused Physical Exam:  Subcutaneous Fat Loss  Defer Subcutaneous Fat Loss Assessment: Defer all  Defer All Reason: visually appears nourished  Muscle Wasting  Defer Muscle Wasting Assessment: Defer all  Defer All Reason: visually appears nourished  Edema  Edema: none  Physical Findings  Hair: Negative  Eyes: Negative  Nails: Negative  Skin: Positive (dryness)      Estimated Energy Needs:  Energy Needs  Estimated Energy Needs  Total Energy Estimated Needs in 24 hours (kCal): 1590 kCal  Energy Estimated Needs per kg Body Weight in 24 hours (kCal/kg): 30 kCal/kg  Estimated Protein Needs  Total Protein Estimated Needs in 24 Hours (g): 63 g  Protein Estimated Needs per kg Body Weight in 24 Hours (g/kg): 1.2 g/kg  Method for Estimating 24 Hour Protein Needs: Use actual wt to calculate nutrient needs.     Nutrition Diagnosis:  Nutrition Diagnosis     Patient has Nutrition Diagnosis Yes   Diagnosis Status (1) New   Nutrition Diagnosis 1 Food and  nutrition related knowledge deficit   Related to (1) how to eta for diabetes with A1c at 7.0% along with Common variable immunodeficiency   As Evidenced by (1) reports by pt of the need to learn.   Additional Nutrition Diagnosis Diagnosis 2   Diagnosis Status (2) New   Nutrition Diagnosis 2 Inconsistent carbohydrate intake   Related to (2) lack of meal and sanck schedule in the presence of diabetes   As Evidenced by (2) CHO intake at meals can range between 15-40+ grams.       Nutrition Interventions/Recommendations:  Medical nutrition therapy was given for diabetes and to improve immune function.     Nutrition Prescription:  1,600 calories per day to help return weight back to normal. Continue with adequate protein of 63 grams per day. CHO consistent meal plan with aim for 45-60 grams of carbohydrates at meals and 15 grams at snacks to reduce A1c. Heart healthy meal plan with focus on omega three fatty acids of 1.1 gram per day. DRI for calcium considering bone compromise of 1300- 1,500 mg per day.    Nutrition Prescription     Individualized Nutrition Prescription Provided for Oral nutrition   Food and Nutrition Delivery     Meals & Snacks Carbohydrate-modified diet   Goals For individuals with diabetes, maintaining a consistent carbohydrate intake at each meal and snack is crucial for effective blood sugar management. This approach, known as the consistent carbohydrate diet (CCHO), helps prevent large fluctuations in blood glucose levels, which can be beneficial for managing diabetes. Benefits of Consistent Carbohydrate Intake: Improved Blood Sugar Control: Consistently eating the same amount of carbohydrates at each meal and snack can help stabilize blood sugar levels, reducing the risk of both high and low blood sugar episodes. Simplified Blood Sugar Monitoring: Consistent carb intake can make it easier to predict how your blood sugar will respond to meals and snacks, simplifying blood sugar monitoring and  adjusting insulin dosages (if applicable). Potential for Weight Management: By managing carb intake and preventing spikes and dips in blood sugar, you can potentially support weight management efforts.   Nutrition Counseling     Nutrition Counseling Strategies Nutrition counseling based on motivational interviewing strategy; Nutrition counseling based on goal setting strategy     Nutrition Monitoring and Evaluations:    Food and Nutrient Intake     Monitoring and Evaluation Plan Meal/snack pattern; Carbohydrate intake; Protein intake   Anthropometric measurements     Monitoring and Evaluation Plan Weight   Biochemical Data, Medical Tests and Procedures     Monitoring and Evaluation Plan Glucose/endocrine profile   Glucose/Endocrine Profile Hemoglobin A1c (HgbA1c)     Nutrition Goals:  Via teach back method patient verbalized understanding of the following topics:  1) Aim for three meals and 1 snacks per day.  Strive to eat breakfast within 2 hours of waking to help stabilize blood glucose.  jump start the metabolism. Aim for breakfast by 10:00 am, lunch at 2:00, dinner at 6:00 and trial a bedtime snack at 9:00 pm.   2). Strive to include protein in the meals and even snacks. Protein in snacks can sustain energy, stabilize blood glucose, and provide more contentment. Protein foods include eggs, egg whites, cheese, nuts, nut butters, Greek yogurt, Fairlife milk, poultry, meat, fish, tofu, plant-based protein powder, and/or plant-based protein drinks. Strive to aim for 20 grams of protein at meals and 15 grams at bedtime snack.   3) It is recommended to consume 45-60 grams of carbohydrates at meals and 15 grams of carbohydrates at snacks.     Educational Handouts/Practices: [facility]'s CHO counting guide   Next Session: Anti-inflammatory with omega three fatty acids      Shawna Baptiste, MS, RDN, LD, BONNY, MB-EAT-P   Advanced Practice Clinical Dietitian   Mindfulness-Based Eating Awareness Training [facility]  [facility]   [facility] [place]   [email or web address]   Scheduling Line [phone]   Direct Line [phone]      Readiness to Change : Good  Level of Understanding : Good  Anticipated Compliant : Good       [1]   Patient Active Problem List  Diagnosis    Bronchiectasis with acute exacerbation (Multi)    Pseudomonas aeruginosa infection    Chronic bilateral low back pain with left-sided sciatica    Chronic headache    Chronic pain in left foot    Chronic left shoulder pain    Common variable immunodeficiency    Cystocele with uterine prolapse    Depression, recurrent (CMS-HCC)    Recurrent UTI    Dyslipidemia    GERD (gastroesophageal reflux disease)    Hypokalemia    Hypothyroidism    Immunodeficiency (Multi)    Hypersomnia    Leukopenia    Malaise    Melanocytic nevi of trunk    Migraine syndrome    Multinodular goiter    Neoplasm of uncertain behavior of pancreas    Osteopenia    Pancreatic cyst (HHS-HCC)    Physical deconditioning    Postmenopausal atrophic vaginitis    Postural dizziness    Pyelonephritis    Restless legs syndrome    Rhytidosis facialis    Sciatic radiculitis    Snores    Suspected sleep apnea    Vitamin D deficiency    Age related osteoporosis    Insomnia    SNHL (sensorineural hearing loss)    Varicose veins of both lower extremities    Tarlov cysts    Varicose veins of lower extremity    Visceral larva migrans syndrome    Muscle weakness on examination    Atherosclerosis of aorta (CMS-HCC)    Mycobacterium avium-intracellulare infection (Multi)    Wellness examination    Cavitary lesion of lung    Immunoglobulin deficiency (Multi)    Iron deficiency anemia    Exudative age-related macular degeneration of left eye with active choroidal neovascularization    Hyperglycemia   [2]   Current Outpatient Medications:     aflibercept (Eylea) 2 mg/0.05 mL intra-ocular injection, 0.05 mL (2 mg) by intravitreal route 1 time., Disp: , Rfl:     albuterol 2.5  mg /3 mL (0.083 %) nebulizer solution, Take 3 mL (2.5 mg) by nebulization 3 times a day., Disp: 810 mL, Rfl: 3    azelastine (Astelin) 137 mcg (0.1 %) nasal spray, Administer 2 sprays into each nostril 2 times a day as needed., Disp: , Rfl:     CALCIUM ALGINATE, BULK, MISC, 5 mL once daily., Disp: , Rfl:     calcium carbonate (Tums) 200 mg calcium chewable tablet, Chew 1 tablet (500 mg) 3 times a day., Disp: , Rfl:     CALCIUM CARBONATE-VITAMIN D3 ORAL, Take 1 tablet by mouth once daily., Disp: , Rfl:     coenzyme Q-10 100 mg capsule, Take 1 capsule (100 mg) by mouth., Disp: , Rfl:     cranberry 500 mg capsule, Take 1 capsule by mouth once daily., Disp: , Rfl:     escitalopram (Lexapro) 5 mg tablet, TAKE 1 TABLET EVERY EVENING, Disp: 90 tablet, Rfl: 1    ferrous sulfate  mg ER tablet, Take 1 tablet by mouth once daily., Disp: , Rfl:     folic acid (Folvite) 1 mg tablet, Take 1 tablet (1 mg) by mouth once daily., Disp: , Rfl:     immune globulin, human, (Privigen) 10 % solution infusion, orally every 3 weeks, Disp: , Rfl:     ipratropium (Atrovent) 42 mcg (0.06 %) nasal spray, Administer 2 sprays into each nostril 3 times a day., Disp: , Rfl:     levothyroxine (Synthroid, Levoxyl) 75 mcg tablet, Take 1 tablet (75 mcg) by mouth once daily in the morning. Take before meals. Take 1 tablet daily, Disp: 100 tablet, Rfl: 1    magnesium oxide (Mag-Ox) 250 mg magnesium tablet, Take 1 tablet (250 mg) by mouth once daily., Disp: , Rfl:     potassium chloride ER (Micro-K) 10 mEq ER capsule, TAKE 1 CAPSULE DAILY WITH FOOD, Disp: 90 capsule, Rfl: 1    psyllium (Metamucil) 0.4 gram capsule, Take 1 capsule by mouth 4 times a day as needed., Disp: , Rfl:     rOPINIRole (Requip) 4 mg tablet, Take 1 tablet (4 mg) by mouth once daily at bedtime., Disp: 90 tablet, Rfl: 1    rosuvastatin (Crestor) 5 mg tablet, Take one tablet qday, Disp: 90 tablet, Rfl: 1    sodium chloride 3% nebulizer solution, Take 3 mL by nebulization 3 times a  day., Disp: 810 mL, Rfl: 3

## 2025-05-19 NOTE — PATIENT INSTRUCTIONS
For individuals with diabetes, maintaining a consistent carbohydrate intake at each meal and snack is crucial for effective blood sugar management. This approach, known as the consistent carbohydrate diet (CCHO), helps prevent large fluctuations in blood glucose levels, which can be beneficial for managing diabetes. Benefits of Consistent Carbohydrate Intake: Improved Blood Sugar Control: Consistently eating the same amount of carbohydrates at each meal and snack can help stabilize blood sugar levels, reducing the risk of both high and low blood sugar episodes. Simplified Blood Sugar Monitoring: Consistent carb intake can make it easier to predict how your blood sugar will respond to meals and snacks, simplifying blood sugar monitoring and adjusting insulin dosages (if applicable). Potential for Weight Management: By managing carb intake and preventing spikes and dips in blood sugar, you can potentially support weight management efforts.    1) Aim for three meals and 1 snacks per day.  Strive to eat breakfast within 2 hours of waking to help stabilize blood glucose.  jump start the metabolism. Aim for breakfast by 10:00 am, lunch at 2:00, dinner at 6:00 and trial a bedtime snack at 9:00 pm.   2). Strive to include protein in the meals and even snacks. Protein in snacks can sustain energy, stabilize blood glucose, and provide more contentment. Protein foods include eggs, egg whites, cheese, nuts, nut butters, Greek yogurt, Fairlife milk, poultry, meat, fish, tofu, plant-based protein powder, and/or plant-based protein drinks. Strive to aim for 20 grams of protein at meals and 15 grams at bedtime snack.   3) It is recommended to consume 45-60 grams of carbohydrates at meals and 15 grams of carbohydrates at snacks.     Educational Handouts/Practices: I's CHO counting guide   Next Session: Anti-inflammatory with omega three fatty acids      Shawna Baptiste, MS, RDN, LD, FAND, MB-EAT-P   Advanced Practice Clinical  Dietitian   Mindfulness-Based Eating Awareness Training Practitioner   [facility]   [facility] [place]   [email or web address]   Scheduling Line [phone]   Direct Line [phone]

## 2025-06-02 DIAGNOSIS — E11.9 TYPE 2 DIABETES MELLITUS WITHOUT COMPLICATION, WITHOUT LONG-TERM CURRENT USE OF INSULIN: ICD-10-CM

## 2025-06-02 DIAGNOSIS — D50.8 OTHER IRON DEFICIENCY ANEMIA: ICD-10-CM

## 2025-06-02 DIAGNOSIS — E03.9 HYPOTHYROIDISM, UNSPECIFIED TYPE: ICD-10-CM

## 2025-06-02 DIAGNOSIS — D83.9 COMMON VARIABLE IMMUNODEFICIENCY: Primary | ICD-10-CM

## 2025-06-11 LAB
ANION GAP SERPL CALCULATED.4IONS-SCNC: 9 MMOL/L (CALC) (ref 7–17)
BUN SERPL-MCNC: 16 MG/DL (ref 7–25)
BUN/CREAT SERPL: NORMAL (CALC) (ref 6–22)
CALCIUM SERPL-MCNC: 9.4 MG/DL (ref 8.6–10.4)
CHLORIDE SERPL-SCNC: 102 MMOL/L (ref 98–110)
CO2 SERPL-SCNC: 26 MMOL/L (ref 20–32)
CREAT SERPL-MCNC: 0.68 MG/DL (ref 0.6–1)
EGFRCR SERPLBLD CKD-EPI 2021: 90 ML/MIN/1.73M2
ERYTHROCYTE [DISTWIDTH] IN BLOOD BY AUTOMATED COUNT: 14.8 % (ref 11–15)
EST. AVERAGE GLUCOSE BLD GHB EST-MCNC: 143 MG/DL
EST. AVERAGE GLUCOSE BLD GHB EST-SCNC: 7.9 MMOL/L
GLUCOSE SERPL-MCNC: 92 MG/DL (ref 65–99)
HBA1C MFR BLD: 6.6 %
HCT VFR BLD AUTO: 39.6 % (ref 35–45)
HGB BLD-MCNC: 11.6 G/DL (ref 11.7–15.5)
IRON SATN MFR SERPL: 16 % (CALC) (ref 16–45)
IRON SERPL-MCNC: 40 MCG/DL (ref 45–160)
MCH RBC QN AUTO: 28.3 PG (ref 27–33)
MCHC RBC AUTO-ENTMCNC: 29.3 G/DL (ref 32–36)
MCV RBC AUTO: 96.6 FL (ref 80–100)
PLATELET # BLD AUTO: 412 THOUSAND/UL (ref 140–400)
PMV BLD REES-ECKER: 9.6 FL (ref 7.5–12.5)
POTASSIUM SERPL-SCNC: 4.6 MMOL/L (ref 3.5–5.3)
RBC # BLD AUTO: 4.1 MILLION/UL (ref 3.8–5.1)
SODIUM SERPL-SCNC: 137 MMOL/L (ref 135–146)
TIBC SERPL-MCNC: 248 MCG/DL (CALC) (ref 250–450)
WBC # BLD AUTO: 6.9 THOUSAND/UL (ref 3.8–10.8)

## 2025-06-12 ENCOUNTER — APPOINTMENT (OUTPATIENT)
Dept: PRIMARY CARE | Facility: CLINIC | Age: 75
End: 2025-06-12
Payer: MEDICARE

## 2025-06-12 VITALS
BODY MASS INDEX: 19.13 KG/M2 | HEART RATE: 72 BPM | SYSTOLIC BLOOD PRESSURE: 100 MMHG | RESPIRATION RATE: 22 BRPM | DIASTOLIC BLOOD PRESSURE: 63 MMHG | OXYGEN SATURATION: 98 % | WEIGHT: 119 LBS | HEIGHT: 66 IN

## 2025-06-12 DIAGNOSIS — E78.5 DYSLIPIDEMIA: ICD-10-CM

## 2025-06-12 DIAGNOSIS — E11.9 DIABETES MELLITUS WITHOUT COMPLICATION: Primary | ICD-10-CM

## 2025-06-12 PROCEDURE — 99213 OFFICE O/P EST LOW 20 MIN: CPT | Performed by: INTERNAL MEDICINE

## 2025-06-12 PROCEDURE — G2211 COMPLEX E/M VISIT ADD ON: HCPCS | Performed by: INTERNAL MEDICINE

## 2025-06-12 PROCEDURE — 1036F TOBACCO NON-USER: CPT | Performed by: INTERNAL MEDICINE

## 2025-06-12 RX ORDER — DEXTROSE 4 G
TABLET,CHEWABLE ORAL
Qty: 1 EACH | Refills: 0 | Status: SHIPPED | OUTPATIENT
Start: 2025-06-12

## 2025-06-12 RX ORDER — BLOOD SUGAR DIAGNOSTIC
1 STRIP MISCELLANEOUS
Qty: 100 STRIP | Refills: 0 | Status: SHIPPED | OUTPATIENT
Start: 2025-06-12

## 2025-06-12 RX ORDER — COLISTIMETHATE SODIUM 150 MG/1
INJECTION, POWDER, LYOPHILIZED, FOR SOLUTION INTRAMUSCULAR; INTRAVENOUS
COMMUNITY
Start: 2025-06-06

## 2025-06-12 RX ORDER — DEXTROSE 4 G
TABLET,CHEWABLE ORAL
Qty: 1 EACH | Refills: 0 | Status: SHIPPED | OUTPATIENT
Start: 2025-06-12 | End: 2025-06-12

## 2025-06-12 RX ORDER — LANCETS 26 GAUGE
EACH MISCELLANEOUS
Qty: 1 EACH | Refills: 0 | Status: SHIPPED | OUTPATIENT
Start: 2025-06-12 | End: 2026-06-12

## 2025-06-12 RX ORDER — ROSUVASTATIN CALCIUM 5 MG/1
TABLET, COATED ORAL
Qty: 90 TABLET | Refills: 1 | Status: SHIPPED | OUTPATIENT
Start: 2025-06-12

## 2025-06-12 RX ORDER — LANCETS 26 GAUGE
EACH MISCELLANEOUS
Qty: 1 EACH | Refills: 0 | Status: SHIPPED | OUTPATIENT
Start: 2025-06-12 | End: 2025-06-12

## 2025-06-12 RX ORDER — BLOOD SUGAR DIAGNOSTIC
1 STRIP MISCELLANEOUS
Qty: 100 STRIP | Refills: 0 | Status: SHIPPED | OUTPATIENT
Start: 2025-06-12 | End: 2025-06-12

## 2025-06-12 ASSESSMENT — ENCOUNTER SYMPTOMS
POLYPHAGIA: 0
POLYDIPSIA: 0

## 2025-06-12 NOTE — PATIENT INSTRUCTIONS
Increase calories to 2,000 kcal/day continue same ratio of protein/carbs, monitor your sugars about 5 x week, either fasting or 2 hour after.Call or sent message if your fasting sugar is persistent above 140 or and if your post meal is persistent above 180,. Return in 2 m here

## 2025-06-12 NOTE — PROGRESS NOTES
"Subjective   Patient ID: ELIANE Yan is a 74 y.o. female who presents for diabetes new onset    HPI Pt in last few years has had insulin resistance, however since earlier this year she has hmga1c above range of diabetes.  April was 7.2 and yesterday 6.6. After April she cut down completely carbs lost weight,   Nutritionist  4 weeks ago, advised 20 gr protein/45 to 60 carbs, 3 x day, snack 15 gr/15 gr x 1 snack, total kcal 1600 ,pt feels very hungry. Concern about her overall inmunologic disease.    Review of Systems   Endocrine: Negative for polydipsia, polyphagia and polyuria.   All other systems reviewed and are negative.      Objective   /63 (BP Location: Left arm, Patient Position: Sitting)   Pulse 72   Resp 22   Ht 1.676 m (5' 6\")   Wt 54 kg (119 lb)   LMP  (LMP Unknown)   SpO2 98%   BMI 19.21 kg/m²     Physical Exam  5 lb up  Eyes- Conjunctiva clear  Neck-no thyromegaly  Cardiac- regular rate and rhythm, no murmurs  Lung- clear to auscultation  GI- present  bowel sounds, nontender, no rebound  MSK- no deformities  Extremities- no edema, good distal pulses  Neuro- non focal, oriented x 3  Psychiatric- pleasant, well groomed, no hallucinations    Assessment/Plan   Assessment & Plan  Dyslipidemia  Continue same statin  Orders:    rosuvastatin (Crestor) 5 mg tablet; Take one tablet qday    Diabetes mellitus without complication  Discussed diet, exercise, monitor, re eval in 2 m since hmga1c has dropped need of meds.  Orders:    blood-glucose meter (Accu-Chek Guide Glucose Meter) misc; USE TO CHECK GLUCOSE ONCE A DAY BEFORE BREAKFAST    blood sugar diagnostic (Accu-Chek Guide test strips); 1 strip once daily in the morning. Take before meals.    Autolet (Accu-Chek FastClix Lancing Dev) lancing device; USE ONE LANCET DAILY           "

## 2025-06-12 NOTE — ASSESSMENT & PLAN NOTE
Discussed diet, exercise, monitor, re eval in 2 m since hmga1c has dropped need of meds.  Orders:    blood-glucose meter (Accu-Chek Guide Glucose Meter) misc; USE TO CHECK GLUCOSE ONCE A DAY BEFORE BREAKFAST    blood sugar diagnostic (Accu-Chek Guide test strips); 1 strip once daily in the morning. Take before meals.    Autolet (Accu-Chek FastClix Lancing Dev) lancing device; USE ONE LANCET DAILY

## 2025-07-07 DIAGNOSIS — E87.6 HYPOKALEMIA: ICD-10-CM

## 2025-07-08 ENCOUNTER — TELEPHONE (OUTPATIENT)
Dept: PRIMARY CARE | Facility: CLINIC | Age: 75
End: 2025-07-08
Payer: MEDICARE

## 2025-07-08 ENCOUNTER — TELEPHONE (OUTPATIENT)
Dept: PULMONOLOGY | Facility: HOSPITAL | Age: 75
End: 2025-07-08
Payer: MEDICARE

## 2025-07-08 DIAGNOSIS — J47.9 BRONCHIECTASIS WITHOUT COMPLICATION (MULTI): ICD-10-CM

## 2025-07-08 RX ORDER — ALBUTEROL SULFATE 0.83 MG/ML
2.5 SOLUTION RESPIRATORY (INHALATION) 3 TIMES DAILY
Qty: 810 ML | Refills: 3 | Status: SHIPPED | OUTPATIENT
Start: 2025-07-08

## 2025-07-08 RX ORDER — POTASSIUM CHLORIDE 750 MG/1
10 CAPSULE, EXTENDED RELEASE ORAL
Qty: 90 CAPSULE | Refills: 0 | Status: SHIPPED | OUTPATIENT
Start: 2025-07-08

## 2025-07-08 NOTE — TELEPHONE ENCOUNTER
Base in last 2 potasium labs, advice to decrease from 5 x week to 3 x week, however if leg cramps worsen may increase again, we will recheck potasium I August visit

## 2025-07-08 NOTE — TELEPHONE ENCOUNTER
Pt's pharmacy reached out needing a refill on pt's albuterol nebulizer solution. Refill order placed and routed to Dr. Gil to sign.

## 2025-07-16 ENCOUNTER — APPOINTMENT (OUTPATIENT)
Dept: OTOLARYNGOLOGY | Facility: CLINIC | Age: 75
End: 2025-07-16
Payer: MEDICARE

## 2025-07-16 VITALS — WEIGHT: 119 LBS | BODY MASS INDEX: 19.21 KG/M2

## 2025-07-16 DIAGNOSIS — R05.3 CHRONIC COUGH: Primary | ICD-10-CM

## 2025-07-16 DIAGNOSIS — J31.0 CHRONIC RHINITIS: ICD-10-CM

## 2025-07-16 DIAGNOSIS — K21.9 CHRONIC GERD: ICD-10-CM

## 2025-07-16 PROCEDURE — 99214 OFFICE O/P EST MOD 30 MIN: CPT | Performed by: GENERAL PRACTICE

## 2025-07-16 PROCEDURE — 1159F MED LIST DOCD IN RCRD: CPT | Performed by: GENERAL PRACTICE

## 2025-07-16 PROCEDURE — G8433 SCR FOR DEP NOT CPT DOC RSN: HCPCS | Performed by: GENERAL PRACTICE

## 2025-07-16 NOTE — PROGRESS NOTES
Otolaryngology - Head and Neck Surgery Outpatient Established Patient Visit Note        Assessment/Plan:   Problem List Items Addressed This Visit    None  Visit Diagnoses         Codes      Chronic cough    -  Primary R05.3      Chronic GERD     K21.9    Relevant Orders    Referral to Gastroenterology      Chronic rhinitis     J31.0            74yoF with waxing/waning GERD/LPR symptoms with limited control with gaviscon/alginates and dietary controls.  Discussed PRN use of famotidine.    Difficulty in managing diet between controls for reflux, diabetes and weight loss.   Limited benefit from conversation with nutritionist.  Would like to discuss long term management of reflux with GI.     Baseline rhinitis controlled with astelin/nasacort/atrovent.            Follow up:  -plan for follow up in clinic as needed    All of the above findings, impressions, treatment planning and follow up plans were discussed with the patient who indicated understanding.  the patient was instructed to contact or return to clinic sooner if symptoms/signs persist or worsen despite the above management.      Enrico Liu MD  Otolaryngology - Head and Neck Surgery            History Of Present Illness  Abad Yan is a 74 y.o. female presenting for follow up of chronic cough.     Reports waxing/waning symptoms.  ID to start inhaled antibiotics for pseudomonas in lung cavity lesion      Pt reports waxing/waning cough, with recent increase in reflux symptoms.  Notes difficulty coordinating diet to control for diabetes, reflux and need for weight gain.      Using reflux gourmet to aid in relief of reflux with some effect.  Sometimes feels that it wears off overnight.     Recall  Abad Yan is a 74 y.o. female presenting for follow up of cough and prior findings of LPR on NP scope.     Reports improvement in symptoms with use of gaviscon and PRN H2 blocker     Waxing/waning cough, with recent increase in yellow/green  productive cough.   No discolored rhinorrhea or sense of sinusitis  Continues on astelin, atrovent, budesonide in saline irrigation.     Sputum cultures planned for eval of lung source.      Recall     Abad Yan is a 74 y.o. female presenting for bothersome chronic cough.   Notes months/year of intermittent cough causing coughing fits.    Notes no inciting illness/trauma, but pt with known R upper lobe cavitary lung      Noted to have some sphenoid sinus inflammation on recent CT, question about contributing to chronicc ough.     Notes a history of mild congesion and some PND. But no sinus pain/pressure and rare acute sinusitis.  No significant allergic rhinitis.     Reports a history of GERD, poorly controlled, with intermittent hoang reflux into throat.      Recall     Abad Yan is a 74 y.o. female presenting for follow up of hearing loss as well as discussion left neck fullness/mass.      Reports no chagne in hearing.  Recent audiogram 10/4/24 shows no changes from last year.     Pt reports left high neck (tail of parotid region) mass/fullness which has been present for >2-3.  No inciting illness/trauma recalled  Reports mild increase in size over time,  Mild waxing/waning with URI symptoms.    Minimally TTP.        No dysphagia, odynophagia, dysphonia, dyspnea.  No oral lesions or masses.  No sore throat or globus sensation.  No otalgia, aural fullness or hearing change.  No new nasal obstruction or epistaxis.  No other neck masses or lumps.  No unintentional weight loss, night sweats, F/C, N/V or systemic symptoms of toxicity or malignancy.      Recall     72yoF presents for evaluation of left sided hearing loss.      The patient reports slow progression of the hearing loss over time.  The patient denies tinnitus.  The patient denies a history of significant noise exposure due to occupational exposures, industrial noise, etc.   The patient denies sudden changes in hearing.  The patient  denies otalgia, otorrhea, vertigo, or facial weakness.   The patient denies a history of otologic surgery or trauma.  The patient denies a history of blast injury, TBI or concussion associated with hearing loss.  The patient denies a family history of significant hearing loss.  The patient reports a history of AOM as a child, but no recent significant history of ear infection.           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, Cavitary lesion of lung (08/05/2024), Chest pain (02/12/2024), Chronic headache, Chronic rhinitis, Contact with and (suspected) exposure to pediculosis, acariasis and other infestations (07/24/2014), COPD (chronic obstructive pulmonary disease) (Multi) (2009), Cramps of lower extremity (02/12/2024), Depression (1965), Diabetes mellitus (Multi) (April, 2025), Disease of thyroid gland (2019), Disorder of kidney and ureter, unspecified (01/19/2016), DVT of axillary vein, acute left (Multi) (08/03/2024), Eczema, Encounter for screening mammogram for malignant neoplasm of breast (12/09/2015), Fall due to slipping on ice or snow (02/12/2024), Fatigue (2000), GERD (gastroesophageal reflux disease) (1990), Headache, unspecified (06/28/2017), Hemoptysis (10/25/2019), History of cardiovascular disorder (02/12/2024), HL (hearing loss) (2022), Hyperlipidemia, Hypotension determined by examination (03/14/2024), Hypothyroidism, Immunodeficiency (Multi), Leukopenia, Macular degeneration (05/27/2023), Menopausal and female climacteric states (07/21/2015), Migraine (2019), Nausea (02/12/2024), Nondisplaced fracture of neck of right radius, initial encounter for closed fracture (05/08/2019), On apixaban therapy  (08/05/2024), Osteopenia, Other conditions influencing health status, Other conditions influencing health status, Pain in wrist (02/12/2024), Palpitations (09/13/2023), Pancreatic cyst ([facility]), Personal history of diseases of the blood and blood-forming organs and certain disorders involving the immune mechanism (05/27/2022), Personal history of other diseases of the circulatory system (12/07/2018), Personal history of other diseases of the circulatory system (11/05/2018), Personal history of other diseases of the circulatory system (12/07/2018), Personal history of other diseases of the circulatory system, Personal history of other diseases of the female genital tract (07/21/2015), Personal history of other diseases of the female genital tract, Personal history of other diseases of the respiratory system (08/05/2013), Personal history of other diseases of the respiratory system (01/27/2016), Personal history of other diseases of the respiratory system (09/12/2013), Personal history of other diseases of the respiratory system (12/16/2013), Personal history of other diseases of the respiratory system (08/05/2013), Personal history of other diseases of urinary system (03/14/2022), Personal history of other specified conditions (03/10/2016), Personal history of other specified conditions (01/06/2020), Personal history of other specified conditions (11/21/2018), Pneumonia, unspecified organism (11/06/2015), Postmenopausal bleeding (09/13/2023), Pseudomonas aeruginosa infection (02/12/2024), Rash (02/12/2024), Rash and other nonspecific skin eruption (12/08/2014), Recurrent upper respiratory infection (URI) (1990), Restless legs, Sinusitis (1990), Sleep difficulties (2000), Suspected sleep apnea, Trochanteric bursitis, unspecified hip (04/18/2014), Urinary tract infection, site not specified (02/10/2022), Uterine prolapse, and Visual impairment.    Surgical History  She has a past surgical history that includes Hernia  repair (10/08/2012); Dilation and curettage of uterus (10/08/2012); Bronchoscopy (07/31/2024); Eye surgery; and [place] tooth extraction (1967).     Social History  She reports that she quit smoking about 48 years ago. Her smoking use included cigarettes. She started smoking about 58 years ago. She has a 15 pack-year smoking history. She has never used smokeless tobacco. She reports that she does not currently use alcohol. She reports that she does not use drugs.    Family History  Family History[1]     Allergies  Doxepin, Fluoxetine, Nortriptyline, and Sertraline    Review of Systems  ROS: Pertinent positives as noted in HPI.    - CONSTITUTIONAL: Does not report weight loss, fever or chills.    - HEENT:   Ear: Does not report tinnitus, vertigo, hearing loss, otalgia, otorrhea  Nose: Does not report  ,  , epistaxis, decreased smell  Throat: Does not report pain, dysphagia, odynophagia  Larynx: Does not report hoarseness,  difficulty breathing, pain with speaking (odynophonia)  Neck: Does not report new masses, pain, swelling  Face: Does not report sinus pain, pressure, swelling, numbness, weakness     - RESPIRATORY: Does not report SOB or  .    - CV: Does not report palpitations or chest pain.     - GI: Does not report abdominal pain, nausea, vomiting or diarrhea.    - : Does not report dysuria or urinary frequency.    - MSK: Does not report myalgia or joint pain.    - SKIN: Does not report rash or pruritus.    - NEUROLOGICAL: Does not report headache or syncope.    - PSYCHIATRIC: Does not report recent changes in mood. Does not report anxiety or depression.         Physical Exam:     GENERAL:   Alert & Oriented to person, place and time; Normal affect and appearance. Well developed and well nourished. Conversant & cooperative with examination.     HEAD:   Normocephalic, atraumatic. No sinus tenderness to palpation. Normal parotid bilaterally. Normal facial strength.     NEUROLOGIC:   Cranial nerves II-XII grossly  intact, gait WNL. Normal mood and affect.    EYES:   Extraocular movements intact. Pupils equal, round, reactive to light and accommodation. No nystagmus, no ptosis. no scleral injection.    EAR:   Normal auricle. No discomfort or TTP with manipulation.   Handheld otoscopic exam showed normal external auditory canals bilaterally. No purulence or EAC inflammation. Minimal cerumen.   Right tympanic membrane clear and mobile without evidence of perforation, retraction or middle ear effusion.   Left tympanic membrane clear and mobile without evidence of perforation, retraction or middle ear effusion.     NOSE:   No external deformity. No external nasal lesions, lacerations, or scars. Nasal tip symmetrical with normal nasal valves.   Nasal cavity with essentially midline septum, normal mucosa and turbinates. No lesions, masses, purulence or polyps.     OC/OP:   Mucous membranes moist, no masses, lesions or exudates.   Normal tongue, floor of mouth, teeth, gums, lips. Normal posterior pharyngeal wall.    Normal tonsils without erythema, exudate or obvious calculi     NECK:   No neck masses or thyroid enlargement. Trachea midline. No tenderness to palpation    LYMPHATIC:   No cervical lymphadenopathy.     RESPIRATORY:   Symmetric chest elevation & no retractions. No significant hoarseness. No increased work of breathing.    CV:   No clubbing or cyanosis. No obvious edema    Skin:   No facial rashes, vesicles or lesions.     Extremities:   No gross abnormalities      Clinic Procedure        Information review:  External sources (notes, imaging, lab results) listed below personally reviewed to aid in medical decision making process.  -  -  -         [1]   Family History  Problem Relation Name Age of Onset    Cancer Brother      Other (cardiovascular disorder) Brother      Allergies Other      Cancer Other      Hearing loss Other      Hypertension Other      Other (pulmonary disease) Other      Breast cancer Father's Sister   90    Arthritis Brother Alexx Russo     Vision loss Brother Alexx Russo     Breast cancer Father's Sister Andi Rsuso     Vision loss Father's Sister Andi Russo     Cancer Paternal Grandmother Manuel Russo     COPD Mother Manuel Russo     Depression Mother Manuel Russo     Stroke Mother Manuel Russo     Hearing loss Father Anibal Russo     Stroke Father Anibal Russo     Stroke Maternal Grandmother Brea Russo     Lung disease Mother Manuel Russo     Cancer Maternal Grandmother Brea Russo     Cancer Father's Sister Andi Russo     Cancer Paternal Grandfather Manuel Russo 70 - 79    Stroke Paternal Grandfather Manuel Russo     Depression Mother Ericka Russo     Lung disease Mother Ericka Russo     Stroke Father Anibal Russo 60 - 69

## 2025-07-21 ENCOUNTER — APPOINTMENT (OUTPATIENT)
Dept: NUTRITION | Facility: CLINIC | Age: 75
End: 2025-07-21
Payer: MEDICARE

## 2025-07-22 ENCOUNTER — HOSPITAL ENCOUNTER (OUTPATIENT)
Dept: RADIOLOGY | Facility: CLINIC | Age: 75
Discharge: HOME | End: 2025-07-22
Payer: MEDICARE

## 2025-07-22 DIAGNOSIS — J47.1 BRONCHIECTASIS WITH ACUTE EXACERBATION (MULTI): ICD-10-CM

## 2025-07-22 DIAGNOSIS — A49.8 PSEUDOMONAS AERUGINOSA INFECTION: ICD-10-CM

## 2025-07-22 PROCEDURE — 71250 CT THORAX DX C-: CPT | Performed by: STUDENT IN AN ORGANIZED HEALTH CARE EDUCATION/TRAINING PROGRAM

## 2025-07-22 PROCEDURE — 71250 CT THORAX DX C-: CPT

## 2025-07-29 ENCOUNTER — APPOINTMENT (OUTPATIENT)
Dept: PULMONOLOGY | Facility: HOSPITAL | Age: 75
End: 2025-07-29
Payer: MEDICARE

## 2025-08-02 DIAGNOSIS — E11.9 DIABETES MELLITUS WITHOUT COMPLICATION: ICD-10-CM

## 2025-08-04 RX ORDER — LANCETS
EACH MISCELLANEOUS
Qty: 100 EACH | Refills: 3 | Status: SHIPPED | OUTPATIENT
Start: 2025-08-04

## 2025-08-04 RX ORDER — BLOOD SUGAR DIAGNOSTIC
STRIP MISCELLANEOUS
Qty: 100 STRIP | Refills: 3 | Status: SHIPPED | OUTPATIENT
Start: 2025-08-04

## 2025-08-17 ENCOUNTER — TELEPHONE (OUTPATIENT)
Dept: CARDIOLOGY | Facility: HOSPITAL | Age: 75
End: 2025-08-17
Payer: MEDICARE

## 2025-08-17 DIAGNOSIS — Z86.718 HISTORY OF DVT (DEEP VEIN THROMBOSIS): Primary | ICD-10-CM

## 2025-08-18 DIAGNOSIS — Z86.718 HISTORY OF DVT (DEEP VEIN THROMBOSIS): ICD-10-CM

## 2025-08-26 ENCOUNTER — APPOINTMENT (OUTPATIENT)
Dept: PRIMARY CARE | Facility: CLINIC | Age: 75
End: 2025-08-26
Payer: MEDICARE

## 2025-08-28 ENCOUNTER — OFFICE VISIT (OUTPATIENT)
Dept: PRIMARY CARE | Facility: CLINIC | Age: 75
End: 2025-08-28
Payer: MEDICARE

## 2025-08-28 VITALS
OXYGEN SATURATION: 96 % | RESPIRATION RATE: 16 BRPM | SYSTOLIC BLOOD PRESSURE: 107 MMHG | BODY MASS INDEX: 20.37 KG/M2 | HEART RATE: 80 BPM | HEIGHT: 66 IN | WEIGHT: 126.76 LBS | DIASTOLIC BLOOD PRESSURE: 63 MMHG

## 2025-08-28 DIAGNOSIS — J47.0 BRONCHIECTASIS WITH ACUTE LOWER RESPIRATORY INFECTION: ICD-10-CM

## 2025-08-28 DIAGNOSIS — D50.8 OTHER IRON DEFICIENCY ANEMIA: ICD-10-CM

## 2025-08-28 DIAGNOSIS — E11.9 TYPE 2 DIABETES MELLITUS WITHOUT COMPLICATION, WITHOUT LONG-TERM CURRENT USE OF INSULIN: ICD-10-CM

## 2025-08-28 DIAGNOSIS — E87.6 HYPOKALEMIA: ICD-10-CM

## 2025-08-28 DIAGNOSIS — E78.5 DYSLIPIDEMIA: ICD-10-CM

## 2025-08-28 DIAGNOSIS — E03.9 ACQUIRED HYPOTHYROIDISM: Primary | ICD-10-CM

## 2025-08-28 PROCEDURE — G2211 COMPLEX E/M VISIT ADD ON: HCPCS | Performed by: INTERNAL MEDICINE

## 2025-08-28 PROCEDURE — 1159F MED LIST DOCD IN RCRD: CPT | Performed by: INTERNAL MEDICINE

## 2025-08-28 PROCEDURE — 99214 OFFICE O/P EST MOD 30 MIN: CPT | Performed by: INTERNAL MEDICINE

## 2025-08-28 PROCEDURE — 1036F TOBACCO NON-USER: CPT | Performed by: INTERNAL MEDICINE

## 2025-08-28 RX ORDER — CEFTOLOZANE AND TAZOBACTAM 1; .5 G/10ML; G/10ML
1.5 INJECTION, POWDER, LYOPHILIZED, FOR SOLUTION INTRAVENOUS
COMMUNITY
Start: 2025-08-25

## 2025-08-28 ASSESSMENT — ENCOUNTER SYMPTOMS
DYSURIA: 0
POLYPHAGIA: 0
OCCASIONAL FEELINGS OF UNSTEADINESS: 0
CHILLS: 0
FATIGUE: 1
LOSS OF SENSATION IN FEET: 0
DEPRESSION: 0
FEVER: 0
POLYDIPSIA: 0
DIAPHORESIS: 0

## 2025-09-02 DIAGNOSIS — G25.81 RESTLESS LEGS SYNDROME: ICD-10-CM

## 2025-09-02 DIAGNOSIS — F33.9 DEPRESSION, RECURRENT: ICD-10-CM

## 2025-09-03 RX ORDER — ESCITALOPRAM OXALATE 5 MG/1
5 TABLET ORAL EVERY EVENING
Qty: 90 TABLET | Refills: 1 | Status: SHIPPED | OUTPATIENT
Start: 2025-09-03

## 2025-09-03 RX ORDER — ROPINIROLE 4 MG/1
4 TABLET, FILM COATED ORAL NIGHTLY
Qty: 90 TABLET | Refills: 1 | Status: SHIPPED | OUTPATIENT
Start: 2025-09-03

## 2025-09-04 ENCOUNTER — TELEPHONE (OUTPATIENT)
Dept: PULMONOLOGY | Facility: HOSPITAL | Age: 75
End: 2025-09-04
Payer: MEDICARE

## 2025-09-04 DIAGNOSIS — J47.9 BRONCHIECTASIS WITHOUT ACUTE EXACERBATION (MULTI): ICD-10-CM

## 2025-09-11 ENCOUNTER — APPOINTMENT (OUTPATIENT)
Facility: CLINIC | Age: 75
End: 2025-09-11
Payer: MEDICARE

## 2025-11-21 ENCOUNTER — APPOINTMENT (OUTPATIENT)
Dept: GASTROENTEROLOGY | Facility: CLINIC | Age: 75
End: 2025-11-21
Payer: MEDICARE

## 2025-12-15 ENCOUNTER — APPOINTMENT (OUTPATIENT)
Dept: PRIMARY CARE | Facility: CLINIC | Age: 75
End: 2025-12-15
Payer: MEDICARE

## 2026-03-13 ENCOUNTER — APPOINTMENT (OUTPATIENT)
Dept: SURGERY | Facility: CLINIC | Age: 76
End: 2026-03-13
Payer: MEDICARE

## 2026-05-07 ENCOUNTER — APPOINTMENT (OUTPATIENT)
Dept: OBSTETRICS AND GYNECOLOGY | Facility: CLINIC | Age: 76
End: 2026-05-07
Payer: MEDICARE

## 2026-05-13 ENCOUNTER — APPOINTMENT (OUTPATIENT)
Dept: DERMATOLOGY | Facility: CLINIC | Age: 76
End: 2026-05-13
Payer: MEDICARE